# Patient Record
Sex: FEMALE | Race: WHITE | NOT HISPANIC OR LATINO | Employment: FULL TIME | URBAN - METROPOLITAN AREA
[De-identification: names, ages, dates, MRNs, and addresses within clinical notes are randomized per-mention and may not be internally consistent; named-entity substitution may affect disease eponyms.]

---

## 2021-05-18 ENCOUNTER — TRANSCRIBE ORDERS (OUTPATIENT)
Dept: EMERGENCY MEDICINE | Facility: CLINIC | Age: 41
End: 2021-05-18

## 2021-05-18 DIAGNOSIS — K70.40 ALCOHOLIC LIVER FAILURE (H): Primary | ICD-10-CM

## 2021-06-16 PROBLEM — E55.9 VITAMIN D DEFICIENCY: Status: ACTIVE | Noted: 2018-03-13

## 2021-06-16 PROBLEM — R56.9 ALCOHOL WITHDRAWAL SEIZURE WITH COMPLICATION (H): Status: ACTIVE | Noted: 2020-12-04

## 2021-06-16 PROBLEM — L30.9 ECZEMA: Status: ACTIVE | Noted: 2017-01-31

## 2021-06-16 PROBLEM — E87.29 ALCOHOLIC KETOACIDOSIS: Status: ACTIVE | Noted: 2020-12-04
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
## 2021-06-16 PROBLEM — A60.04 HERPES SIMPLEX VULVOVAGINITIS: Status: ACTIVE | Noted: 2018-03-13

## 2021-06-16 PROBLEM — Z12.4 CERVICAL CANCER SCREENING: Status: ACTIVE | Noted: 2017-02-07

## 2021-06-16 PROBLEM — F10.939 ALCOHOL WITHDRAWAL SEIZURE WITH COMPLICATION (H): Status: ACTIVE | Noted: 2020-12-04

## 2023-02-13 ENCOUNTER — HOSPITAL ENCOUNTER (INPATIENT)
Facility: CLINIC | Age: 43
LOS: 2 days | Discharge: HOME | DRG: 439 | End: 2023-02-15
Attending: STUDENT IN AN ORGANIZED HEALTH CARE EDUCATION/TRAINING PROGRAM | Admitting: HOSPITALIST
Payer: COMMERCIAL

## 2023-02-13 ENCOUNTER — APPOINTMENT (OUTPATIENT)
Dept: CT IMAGING | Facility: CLINIC | Age: 43
DRG: 439 | End: 2023-02-13
Attending: STUDENT IN AN ORGANIZED HEALTH CARE EDUCATION/TRAINING PROGRAM
Payer: COMMERCIAL

## 2023-02-13 ENCOUNTER — APPOINTMENT (OUTPATIENT)
Dept: ULTRASOUND IMAGING | Facility: CLINIC | Age: 43
DRG: 439 | End: 2023-02-13
Attending: STUDENT IN AN ORGANIZED HEALTH CARE EDUCATION/TRAINING PROGRAM
Payer: COMMERCIAL

## 2023-02-13 DIAGNOSIS — E83.42 HYPOMAGNESEMIA: ICD-10-CM

## 2023-02-13 DIAGNOSIS — E87.6 HYPOKALEMIA: ICD-10-CM

## 2023-02-13 DIAGNOSIS — K85.90 ACUTE PANCREATITIS, UNSPECIFIED COMPLICATION STATUS, UNSPECIFIED PANCREATITIS TYPE: ICD-10-CM

## 2023-02-13 DIAGNOSIS — F10.20 ALCOHOLISM (H): Primary | ICD-10-CM

## 2023-02-13 LAB
ALBUMIN SERPL-MCNC: 2.5 G/DL (ref 3.5–5)
ALBUMIN UR-MCNC: NEGATIVE MG/DL
ALP SERPL-CCNC: 204 U/L (ref 45–120)
ALT SERPL W P-5'-P-CCNC: 74 U/L (ref 0–45)
ANION GAP SERPL CALCULATED.3IONS-SCNC: 13 MMOL/L (ref 5–18)
APPEARANCE UR: CLEAR
AST SERPL W P-5'-P-CCNC: 99 U/L (ref 0–40)
BACTERIA #/AREA URNS HPF: ABNORMAL /HPF
BASOPHILS # BLD AUTO: 0.1 10E3/UL (ref 0–0.2)
BASOPHILS NFR BLD AUTO: 1 %
BILIRUB SERPL-MCNC: 8 MG/DL (ref 0–1)
BILIRUB UR QL STRIP: ABNORMAL
BNP SERPL-MCNC: 32 PG/ML (ref 0–64)
BUN SERPL-MCNC: 4 MG/DL (ref 8–22)
CALCIUM SERPL-MCNC: 8.6 MG/DL (ref 8.5–10.5)
CHLORIDE BLD-SCNC: 92 MMOL/L (ref 98–107)
CO2 SERPL-SCNC: 29 MMOL/L (ref 22–31)
COLOR UR AUTO: YELLOW
CREAT SERPL-MCNC: 0.59 MG/DL (ref 0.6–1.1)
D DIMER PPP FEU-MCNC: 1.52 UG/ML FEU (ref 0–0.5)
EOSINOPHIL # BLD AUTO: 0 10E3/UL (ref 0–0.7)
EOSINOPHIL NFR BLD AUTO: 0 %
ERYTHROCYTE [DISTWIDTH] IN BLOOD BY AUTOMATED COUNT: 25.2 % (ref 10–15)
ETHANOL SERPL-MCNC: <10 MG/DL
FLUAV RNA SPEC QL NAA+PROBE: NEGATIVE
FLUBV RNA RESP QL NAA+PROBE: NEGATIVE
GFR SERPL CREATININE-BSD FRML MDRD: >90 ML/MIN/1.73M2
GLUCOSE BLD-MCNC: 115 MG/DL (ref 70–125)
GLUCOSE UR STRIP-MCNC: NEGATIVE MG/DL
HCG SERPL QL: NEGATIVE
HCT VFR BLD AUTO: 36 % (ref 35–47)
HGB BLD-MCNC: 12.2 G/DL (ref 11.7–15.7)
HGB UR QL STRIP: NEGATIVE
HOLD SPECIMEN: NORMAL
IMM GRANULOCYTES # BLD: 0.1 10E3/UL
IMM GRANULOCYTES NFR BLD: 1 %
INR PPP: 1.13 (ref 0.85–1.15)
KETONES UR STRIP-MCNC: NEGATIVE MG/DL
LEUKOCYTE ESTERASE UR QL STRIP: NEGATIVE
LIPASE SERPL-CCNC: 23 U/L (ref 0–52)
LYMPHOCYTES # BLD AUTO: 2.2 10E3/UL (ref 0.8–5.3)
LYMPHOCYTES NFR BLD AUTO: 26 %
MAGNESIUM SERPL-MCNC: 1.3 MG/DL (ref 1.8–2.6)
MCH RBC QN AUTO: 33.3 PG (ref 26.5–33)
MCHC RBC AUTO-ENTMCNC: 33.9 G/DL (ref 31.5–36.5)
MCV RBC AUTO: 98 FL (ref 78–100)
MONOCYTES # BLD AUTO: 0.8 10E3/UL (ref 0–1.3)
MONOCYTES NFR BLD AUTO: 9 %
NEUTROPHILS # BLD AUTO: 5.5 10E3/UL (ref 1.6–8.3)
NEUTROPHILS NFR BLD AUTO: 63 %
NITRATE UR QL: NEGATIVE
NRBC # BLD AUTO: 0 10E3/UL
NRBC BLD AUTO-RTO: 0 /100
PH UR STRIP: 7 [PH] (ref 5–7)
PLATELET # BLD AUTO: 225 10E3/UL (ref 150–450)
POTASSIUM BLD-SCNC: 2.4 MMOL/L (ref 3.5–5)
PROT SERPL-MCNC: 6.4 G/DL (ref 6–8)
RBC # BLD AUTO: 3.66 10E6/UL (ref 3.8–5.2)
RBC URINE: 0 /HPF
RSV RNA SPEC NAA+PROBE: NEGATIVE
SARS-COV-2 RNA RESP QL NAA+PROBE: NEGATIVE
SODIUM SERPL-SCNC: 134 MMOL/L (ref 136–145)
SP GR UR STRIP: 1.01 (ref 1–1.03)
SQUAMOUS EPITHELIAL: 1 /HPF
TSH SERPL DL<=0.005 MIU/L-ACNC: 6.39 UIU/ML (ref 0.3–5)
UROBILINOGEN UR STRIP-MCNC: <2 MG/DL
WBC # BLD AUTO: 8.7 10E3/UL (ref 4–11)
WBC URINE: 1 /HPF

## 2023-02-13 PROCEDURE — 84443 ASSAY THYROID STIM HORMONE: CPT | Performed by: HOSPITALIST

## 2023-02-13 PROCEDURE — 83735 ASSAY OF MAGNESIUM: CPT | Performed by: STUDENT IN AN ORGANIZED HEALTH CARE EDUCATION/TRAINING PROGRAM

## 2023-02-13 PROCEDURE — 83690 ASSAY OF LIPASE: CPT | Performed by: STUDENT IN AN ORGANIZED HEALTH CARE EDUCATION/TRAINING PROGRAM

## 2023-02-13 PROCEDURE — 82533 TOTAL CORTISOL: CPT | Performed by: HOSPITALIST

## 2023-02-13 PROCEDURE — 250N000013 HC RX MED GY IP 250 OP 250 PS 637: Performed by: HOSPITALIST

## 2023-02-13 PROCEDURE — 36415 COLL VENOUS BLD VENIPUNCTURE: CPT | Performed by: HOSPITALIST

## 2023-02-13 PROCEDURE — 85610 PROTHROMBIN TIME: CPT | Performed by: HOSPITALIST

## 2023-02-13 PROCEDURE — 82077 ASSAY SPEC XCP UR&BREATH IA: CPT | Performed by: STUDENT IN AN ORGANIZED HEALTH CARE EDUCATION/TRAINING PROGRAM

## 2023-02-13 PROCEDURE — 76705 ECHO EXAM OF ABDOMEN: CPT

## 2023-02-13 PROCEDURE — C9803 HOPD COVID-19 SPEC COLLECT: HCPCS

## 2023-02-13 PROCEDURE — 84703 CHORIONIC GONADOTROPIN ASSAY: CPT | Performed by: STUDENT IN AN ORGANIZED HEALTH CARE EDUCATION/TRAINING PROGRAM

## 2023-02-13 PROCEDURE — 258N000003 HC RX IP 258 OP 636: Performed by: STUDENT IN AN ORGANIZED HEALTH CARE EDUCATION/TRAINING PROGRAM

## 2023-02-13 PROCEDURE — 250N000013 HC RX MED GY IP 250 OP 250 PS 637: Performed by: STUDENT IN AN ORGANIZED HEALTH CARE EDUCATION/TRAINING PROGRAM

## 2023-02-13 PROCEDURE — 83880 ASSAY OF NATRIURETIC PEPTIDE: CPT | Performed by: STUDENT IN AN ORGANIZED HEALTH CARE EDUCATION/TRAINING PROGRAM

## 2023-02-13 PROCEDURE — 81001 URINALYSIS AUTO W/SCOPE: CPT | Performed by: STUDENT IN AN ORGANIZED HEALTH CARE EDUCATION/TRAINING PROGRAM

## 2023-02-13 PROCEDURE — 74177 CT ABD & PELVIS W/CONTRAST: CPT

## 2023-02-13 PROCEDURE — 87637 SARSCOV2&INF A&B&RSV AMP PRB: CPT | Performed by: HOSPITALIST

## 2023-02-13 PROCEDURE — 99222 1ST HOSP IP/OBS MODERATE 55: CPT | Performed by: HOSPITALIST

## 2023-02-13 PROCEDURE — 96361 HYDRATE IV INFUSION ADD-ON: CPT

## 2023-02-13 PROCEDURE — 99285 EMERGENCY DEPT VISIT HI MDM: CPT | Mod: 25

## 2023-02-13 PROCEDURE — 96365 THER/PROPH/DIAG IV INF INIT: CPT

## 2023-02-13 PROCEDURE — 80053 COMPREHEN METABOLIC PANEL: CPT | Performed by: STUDENT IN AN ORGANIZED HEALTH CARE EDUCATION/TRAINING PROGRAM

## 2023-02-13 PROCEDURE — 93005 ELECTROCARDIOGRAM TRACING: CPT | Performed by: STUDENT IN AN ORGANIZED HEALTH CARE EDUCATION/TRAINING PROGRAM

## 2023-02-13 PROCEDURE — 96375 TX/PRO/DX INJ NEW DRUG ADDON: CPT

## 2023-02-13 PROCEDURE — 85025 COMPLETE CBC W/AUTO DIFF WBC: CPT | Performed by: STUDENT IN AN ORGANIZED HEALTH CARE EDUCATION/TRAINING PROGRAM

## 2023-02-13 PROCEDURE — 120N000001 HC R&B MED SURG/OB

## 2023-02-13 PROCEDURE — 96376 TX/PRO/DX INJ SAME DRUG ADON: CPT

## 2023-02-13 PROCEDURE — 85379 FIBRIN DEGRADATION QUANT: CPT | Performed by: HOSPITALIST

## 2023-02-13 PROCEDURE — 250N000011 HC RX IP 250 OP 636: Performed by: STUDENT IN AN ORGANIZED HEALTH CARE EDUCATION/TRAINING PROGRAM

## 2023-02-13 PROCEDURE — 84439 ASSAY OF FREE THYROXINE: CPT | Performed by: HOSPITALIST

## 2023-02-13 PROCEDURE — 36415 COLL VENOUS BLD VENIPUNCTURE: CPT | Performed by: STUDENT IN AN ORGANIZED HEALTH CARE EDUCATION/TRAINING PROGRAM

## 2023-02-13 PROCEDURE — 81001 URINALYSIS AUTO W/SCOPE: CPT | Performed by: EMERGENCY MEDICINE

## 2023-02-13 PROCEDURE — 96367 TX/PROPH/DG ADDL SEQ IV INF: CPT

## 2023-02-13 RX ORDER — HALOPERIDOL 5 MG/ML
2.5-5 INJECTION INTRAMUSCULAR EVERY 6 HOURS PRN
Status: DISCONTINUED | OUTPATIENT
Start: 2023-02-13 | End: 2023-02-15 | Stop reason: HOSPADM

## 2023-02-13 RX ORDER — PROCHLORPERAZINE MALEATE 10 MG
10 TABLET ORAL EVERY 6 HOURS PRN
Status: DISCONTINUED | OUTPATIENT
Start: 2023-02-13 | End: 2023-02-15 | Stop reason: HOSPADM

## 2023-02-13 RX ORDER — MULTIPLE VITAMINS W/ MINERALS TAB 9MG-400MCG
1 TAB ORAL DAILY
Status: DISCONTINUED | OUTPATIENT
Start: 2023-02-13 | End: 2023-02-15 | Stop reason: HOSPADM

## 2023-02-13 RX ORDER — POTASSIUM CHLORIDE 1.5 G/1.58G
40 POWDER, FOR SOLUTION ORAL ONCE
Status: COMPLETED | OUTPATIENT
Start: 2023-02-13 | End: 2023-02-13

## 2023-02-13 RX ORDER — FOLIC ACID 1 MG/1
1 TABLET ORAL DAILY
Status: DISCONTINUED | OUTPATIENT
Start: 2023-02-13 | End: 2023-02-15 | Stop reason: HOSPADM

## 2023-02-13 RX ORDER — KETOROLAC TROMETHAMINE 15 MG/ML
30 INJECTION, SOLUTION INTRAMUSCULAR; INTRAVENOUS ONCE
Status: COMPLETED | OUTPATIENT
Start: 2023-02-13 | End: 2023-02-13

## 2023-02-13 RX ORDER — ONDANSETRON 4 MG/1
4 TABLET, ORALLY DISINTEGRATING ORAL EVERY 6 HOURS PRN
Status: DISCONTINUED | OUTPATIENT
Start: 2023-02-13 | End: 2023-02-15 | Stop reason: HOSPADM

## 2023-02-13 RX ORDER — LORAZEPAM 2 MG/ML
1 INJECTION INTRAMUSCULAR ONCE
Status: COMPLETED | OUTPATIENT
Start: 2023-02-13 | End: 2023-02-13

## 2023-02-13 RX ORDER — FLUMAZENIL 0.1 MG/ML
0.2 INJECTION, SOLUTION INTRAVENOUS
Status: DISCONTINUED | OUTPATIENT
Start: 2023-02-13 | End: 2023-02-15 | Stop reason: HOSPADM

## 2023-02-13 RX ORDER — SODIUM CHLORIDE, SODIUM LACTATE, POTASSIUM CHLORIDE, CALCIUM CHLORIDE 600; 310; 30; 20 MG/100ML; MG/100ML; MG/100ML; MG/100ML
INJECTION, SOLUTION INTRAVENOUS CONTINUOUS
Status: DISCONTINUED | OUTPATIENT
Start: 2023-02-13 | End: 2023-02-15 | Stop reason: HOSPADM

## 2023-02-13 RX ORDER — LORAZEPAM 1 MG/1
1-2 TABLET ORAL EVERY 30 MIN PRN
Status: DISCONTINUED | OUTPATIENT
Start: 2023-02-13 | End: 2023-02-15 | Stop reason: HOSPADM

## 2023-02-13 RX ORDER — LORAZEPAM 2 MG/ML
1-2 INJECTION INTRAMUSCULAR EVERY 30 MIN PRN
Status: DISCONTINUED | OUTPATIENT
Start: 2023-02-13 | End: 2023-02-15 | Stop reason: HOSPADM

## 2023-02-13 RX ORDER — POTASSIUM CHLORIDE 7.45 MG/ML
10 INJECTION INTRAVENOUS ONCE
Status: COMPLETED | OUTPATIENT
Start: 2023-02-13 | End: 2023-02-13

## 2023-02-13 RX ORDER — LANOLIN ALCOHOL/MO/W.PET/CERES
3 CREAM (GRAM) TOPICAL
Status: DISCONTINUED | OUTPATIENT
Start: 2023-02-13 | End: 2023-02-15 | Stop reason: HOSPADM

## 2023-02-13 RX ORDER — MAGNESIUM SULFATE HEPTAHYDRATE 40 MG/ML
2 INJECTION, SOLUTION INTRAVENOUS ONCE
Status: COMPLETED | OUTPATIENT
Start: 2023-02-13 | End: 2023-02-13

## 2023-02-13 RX ORDER — LORAZEPAM 2 MG/ML
0.5 INJECTION INTRAMUSCULAR ONCE
Status: COMPLETED | OUTPATIENT
Start: 2023-02-13 | End: 2023-02-13

## 2023-02-13 RX ORDER — LIDOCAINE 40 MG/G
CREAM TOPICAL
Status: DISCONTINUED | OUTPATIENT
Start: 2023-02-13 | End: 2023-02-15 | Stop reason: HOSPADM

## 2023-02-13 RX ORDER — IOPAMIDOL 755 MG/ML
90 INJECTION, SOLUTION INTRAVASCULAR ONCE
Status: COMPLETED | OUTPATIENT
Start: 2023-02-13 | End: 2023-02-13

## 2023-02-13 RX ORDER — ONDANSETRON 2 MG/ML
4 INJECTION INTRAMUSCULAR; INTRAVENOUS EVERY 6 HOURS PRN
Status: DISCONTINUED | OUTPATIENT
Start: 2023-02-13 | End: 2023-02-15 | Stop reason: HOSPADM

## 2023-02-13 RX ORDER — PROCHLORPERAZINE 25 MG
25 SUPPOSITORY, RECTAL RECTAL EVERY 12 HOURS PRN
Status: DISCONTINUED | OUTPATIENT
Start: 2023-02-13 | End: 2023-02-15 | Stop reason: HOSPADM

## 2023-02-13 RX ADMIN — MAGNESIUM SULFATE HEPTAHYDRATE 2 G: 40 INJECTION, SOLUTION INTRAVENOUS at 19:49

## 2023-02-13 RX ADMIN — FOLIC ACID 1 MG: 1 TABLET ORAL at 21:54

## 2023-02-13 RX ADMIN — SODIUM CHLORIDE 1000 ML: 9 INJECTION, SOLUTION INTRAVENOUS at 17:05

## 2023-02-13 RX ADMIN — POTASSIUM CHLORIDE 40 MEQ: 1.5 POWDER, FOR SOLUTION ORAL at 18:03

## 2023-02-13 RX ADMIN — MULTIPLE VITAMINS W/ MINERALS TAB 1 TABLET: TAB at 21:54

## 2023-02-13 RX ADMIN — SODIUM CHLORIDE 1000 ML: 9 INJECTION, SOLUTION INTRAVENOUS at 20:49

## 2023-02-13 RX ADMIN — LORAZEPAM 1 MG: 2 INJECTION INTRAMUSCULAR; INTRAVENOUS at 20:49

## 2023-02-13 RX ADMIN — KETOROLAC TROMETHAMINE 30 MG: 15 INJECTION, SOLUTION INTRAMUSCULAR; INTRAVENOUS at 17:21

## 2023-02-13 RX ADMIN — POTASSIUM CHLORIDE 10 MEQ: 10 INJECTION, SOLUTION INTRAVENOUS at 17:57

## 2023-02-13 RX ADMIN — LORAZEPAM 0.5 MG: 2 INJECTION INTRAMUSCULAR; INTRAVENOUS at 17:12

## 2023-02-13 RX ADMIN — Medication 100 MG: at 21:54

## 2023-02-13 RX ADMIN — IOPAMIDOL 90 ML: 755 INJECTION, SOLUTION INTRAVENOUS at 17:38

## 2023-02-13 RX ADMIN — SODIUM CHLORIDE, POTASSIUM CHLORIDE, SODIUM LACTATE AND CALCIUM CHLORIDE 1000 ML: 600; 310; 30; 20 INJECTION, SOLUTION INTRAVENOUS at 22:37

## 2023-02-13 ASSESSMENT — ACTIVITIES OF DAILY LIVING (ADL)
ADLS_ACUITY_SCORE: 35
ADLS_ACUITY_SCORE: 33

## 2023-02-13 ASSESSMENT — ENCOUNTER SYMPTOMS
ABDOMINAL DISTENTION: 1
HEMATURIA: 1
NERVOUS/ANXIOUS: 1
VOMITING: 1
FEVER: 0
ABDOMINAL PAIN: 1

## 2023-02-13 NOTE — ED PROVIDER NOTES
EMERGENCY DEPARTMENT ENCOUNTER      NAME: Tere Hoyt  AGE: 42 year old female  YOB: 1980  MRN: [id number]  EVALUATION DATE & TIME: 2/13/2023  4:07 PM    PCP: [facility]    ED PROVIDER: Dane Saravia M.D.      Chief Complaint   Patient presents with     Back Pain     Abdominal Pain     Multiple Complaints         FINAL IMPRESSION:  1. Acute pancreatitis, unspecified complication status, unspecified pancreatitis type    2. Hypokalemia    3. Hypomagnesemia          ED COURSE & MEDICAL DECISION MAKING:    Pertinent Labs & Imaging studies reviewed. (See chart for details)  42 year old female presents to the Emergency Department for evaluation of multiple issues including weakness, edema to her lower extremities, and some abdominal pain.  Considered CHF, DVT as well as multiple other intra-abdominal processes.  Initially started with laboratory evaluation and ordered a CT scan for the abdominal pain.  Patient's electrolytes showed fairly significant hyperkalemia as well as hypomagnesemia.  In addition the patient had pancreatitis seen on CT scan.  Ultrasound showed no obstructive disease.  Liver enzymes and bilirubin were normal.  Patient does admit to EtOH use and appeared clinically dehydrated on exam.    Patient received fluids pain medicine and some Ativan here in the emergency department as well as IV potassium and magnesium replacement.  She felt somewhat better with fluids  However I felt that with the electrolyte derangements that admission to the hospital for further care was necessary.  Lana spoke to the hospitalist who will admit the patient for further care    At the conclusion of the encounter I discussed the results of all of the tests and the disposition. The questions were answered. The patient or family acknowledged understanding and was agreeable with the care plan.        4:15 PM I met the patient and performed my initial interview and exam.  5:28 PM Critical  result: potassium 2.4.   8:03 PM I rechecked and updated the patient on results and plan for admission. Patient states she has been drinking alcohol lately.   9:37 PM I discussed the case with Dr. Álvaro Rollins, hospitalist, who accepts the patient for admission.        Medical Decision Making    History:    Supplemental history from: Documented in chart, if applicable    External Record(s) reviewed: Documented in chart, if applicable.    Work Up:    Chart documentation includes differential considered and any EKGs or imaging independently interpreted by provider, where specified.    In additional to work up documented, I considered the following work up: Documented in chart, if applicable.    External consultation:    Discussion of management with another provider: Hospitalist    Complicating factors:    Care impacted by chronic illness: Other: etoh use    Care affected by social determinants of health: Alcohol Abuse and/or Recreational Drug Use, Low Income and Problems Related to Primary Support Group    Disposition considerations: Admit.          30 minutes of critical care time     MEDICATIONS GIVEN IN THE EMERGENCY:  Medications   lidocaine 1 % 0.1-1 mL (has no administration in time range)   lidocaine (LMX4) cream (has no administration in time range)   sodium chloride (PF) 0.9% PF flush 3 mL (has no administration in time range)   sodium chloride (PF) 0.9% PF flush 3 mL (3 mLs Intracatheter Given 2/13/23 2156)   melatonin tablet 3 mg (has no administration in time range)   lactated ringers infusion (has no administration in time range)   ondansetron (ZOFRAN ODT) ODT tab 4 mg (has no administration in time range)     Or   ondansetron (ZOFRAN) injection 4 mg (has no administration in time range)   prochlorperazine (COMPAZINE) injection 10 mg (has no administration in time range)     Or   prochlorperazine (COMPAZINE) tablet 10 mg (has no administration in time range)     Or   prochlorperazine (COMPAZINE)  suppository 25 mg (has no administration in time range)   OLANZapine zydis (zyPREXA) ODT half-tab 5-10 mg (has no administration in time range)     Or   haloperidol lactate (HALDOL) injection 2.5-5 mg (has no administration in time range)   flumazenil (ROMAZICON) injection 0.2 mg (has no administration in time range)   LORazepam (ATIVAN) tablet 1-2 mg (has no administration in time range)     Or   LORazepam (ATIVAN) injection 1-2 mg (has no administration in time range)   thiamine (B-1) tablet 100 mg (100 mg Oral Given 2/13/23 2154)   folic acid (FOLVITE) tablet 1 mg (1 mg Oral Given 2/13/23 2154)   multivitamin w/minerals (THERA-VIT-M) tablet 1 tablet (1 tablet Oral Given 2/13/23 2154)   LORazepam (ATIVAN) injection 0.5 mg (0.5 mg Intravenous Given 2/13/23 1712)   ketorolac (TORADOL) injection 30 mg (30 mg Intravenous Given 2/13/23 1721)   0.9% sodium chloride BOLUS (0 mLs Intravenous Stopped 2/13/23 1805)   potassium chloride 10 mEq in 100 mL sterile water infusion (0 mEq Intravenous Stopped 2/13/23 1900)   potassium chloride (KLOR-CON) Packet 40 mEq (40 mEq Oral Given 2/13/23 1803)   magnesium sulfate 2 g in 50 mL sterile water intermittent infusion (0 g Intravenous Stopped 2/13/23 2045)   iopamidol (ISOVUE-370) solution 90 mL (90 mLs Intravenous Given 2/13/23 1738)   0.9% sodium chloride BOLUS (0 mLs Intravenous Stopped 2/13/23 2239)   LORazepam (ATIVAN) injection 1 mg (1 mg Intravenous Given 2/13/23 2049)   lactated ringers BOLUS 1,000 mL (1,000 mLs Intravenous New Bag 2/13/23 2237)       NEW PRESCRIPTIONS STARTED AT TODAY'S ER VISIT  New Prescriptions    No medications on file          =================================================================    HPI    Patient information was obtained from: patient     Use of : N/A        Tere RACHELLE Lai is a 42 year old female with a pertinent history of anxiety, depression, alcoholism, alcohol withdrawal seizure, tobacco dependence, who presents for  evaluation of leg swelling, back pain.    Patient reports she has been bedridden for the past ~1 month because she had been feeling ill with symptoms including vomiting and body aches. She was not medically evaluated because she didn't have transportation. At some point while bedridden she developed low back pain which has been constant since onset. Pain is exacerbated when she stands up straight after bending over. Since 2/11 (2 days ago), she has been applying Salonpas patches and BioFreeze, and taking epsom salt baths. She has experienced similar back pain in the past, but never this persistent. On 2/6 (7 days ago) she finally started feeling better and began moving around more to get her strength back, and started eating again. She has been eating salads and fruits, and drinking fluids. Over this past weekend, she had a few episodes of vomiting, bringing up minimal amounts of light brown fluid. Her abdomen has been distended and lower abdomen is crampy. This morning she noticed blood in her urine. On 2/10 (3 days ago), the patient states she was very active. The next afternoon 2/11, she noted bilateral ankle swelling which has since worsened and spread to her lower legs (R>L). No fevers, leg pain.     Additionally, patient reports she received word her estranged  passed away just prior to arrival today. She feels very anxious and overwhelmed. Her parents are coming to town to help her this week. Patient lives at home with her son. Scheduled for phone appointment with PCP tomorrow, but unsure if she will follow through with everything she has going on.       REVIEW OF SYSTEMS   Review of Systems   Constitutional: Negative for fever.   Cardiovascular: Positive for leg swelling (bilateral).   Gastrointestinal: Positive for abdominal distention, abdominal pain (lower) and vomiting.   Genitourinary: Positive for hematuria.   Musculoskeletal:        Negative for leg pain.   Psychiatric/Behavioral: The patient  "is nervous/anxious.    All other systems reviewed and are negative.       PAST MEDICAL HISTORY:  History reviewed. No pertinent past medical history.    PAST SURGICAL HISTORY:  History reviewed. No pertinent surgical history.        CURRENT MEDICATIONS:    levonorgestrel (MIRENA) 20 MCG/DAY IUD  Vitamin D3 (CHOLECALCIFEROL) 125 MCG (5000 UT) tablet        ALLERGIES:  No Known Allergies    FAMILY HISTORY:  History reviewed. No pertinent family history.    SOCIAL HISTORY:   Social History     Socioeconomic History     Marital status:        VITALS:  BP 95/47   Pulse 92   Temp 98.2  F (36.8  C) (Temporal)   Resp 24   Ht 1.575 m (5' 2\")   Wt 79.4 kg (175 lb)   SpO2 95%   BMI 32.01 kg/m        PHYSICAL EXAM    Constitutional: Well developed, Well nourished, NAD, GCS 15  HENT: Dry mucous membranes. Normocephalic, Atraumatic, Bilateral external ears normal, Oropharynx normal, Nose normal. Neck-  Normal range of motion, No tenderness, Supple, No stridor.  Eyes: PERRL, EOMI, Conjunctiva normal, No discharge.   Respiratory: Normal breath sounds, No respiratory distress, No wheezing, Speaks full sentences easily. No cough.  Cardiovascular: Normal heart rate, Regular rhythm, No murmurs, No rubs, No gallops. Chest wall nontender.  GI: Diffuse mild abdominal tenderness. Soft, No masses, No flank tenderness. No rebound or guarding.   Musculoskeletal: 1+ bilateral lower extremity edema. Legs nontender, no asymmetry. 2+ DP pulses. No cyanosis, No clubbing. Good range of motion in all major joints. No tenderness to palpation or major deformities noted.   Integument: Warm, Dry, No erythema, No rash. No petechiae.   Neurologic: Alert & oriented x 3,  CN 3-12 intact Normal motor function, Normal sensory function, No focal deficits noted. Normal gait. Normal finger to nose bilaterally  Psychiatric: Affect normal, Judgment normal, Mood normal. Cooperative.          LAB:  All pertinent labs reviewed and interpreted.  Labs " Ordered and Resulted from Time of ED Arrival to Time of ED Departure   ROUTINE UA WITH MICROSCOPIC REFLEX TO CULTURE - Abnormal       Result Value    Color Urine Yellow      Appearance Urine Clear      Glucose Urine Negative      Bilirubin Urine 1.0 mg/dL (*)     Ketones Urine Negative      Specific Gravity Urine 1.006      Blood Urine Negative      pH Urine 7.0      Protein Albumin Urine Negative      Urobilinogen Urine <2.0      Nitrite Urine Negative      Leukocyte Esterase Urine Negative      Bacteria Urine Few (*)     RBC Urine 0      WBC Urine 1      Squamous Epithelials Urine 1     COMPREHENSIVE METABOLIC PANEL - Abnormal    Sodium 134 (*)     Potassium 2.4 (*)     Chloride 92 (*)     Carbon Dioxide (CO2) 29      Anion Gap 13      Urea Nitrogen 4 (*)     Creatinine 0.59 (*)     Calcium 8.6      Glucose 115      Alkaline Phosphatase 204 (*)     AST 99 (*)     ALT 74 (*)     Protein Total 6.4      Albumin 2.5 (*)     Bilirubin Total 8.0 (*)     GFR Estimate >90     MAGNESIUM - Abnormal    Magnesium 1.3 (*)    CBC WITH PLATELETS AND DIFFERENTIAL - Abnormal    WBC Count 8.7      RBC Count 3.66 (*)     Hemoglobin 12.2      Hematocrit 36.0      MCV 98      MCH 33.3 (*)     MCHC 33.9      RDW 25.2 (*)     Platelet Count 225      % Neutrophils 63      % Lymphocytes 26      % Monocytes 9      % Eosinophils 0      % Basophils 1      % Immature Granulocytes 1      NRBCs per 100 WBC 0      Absolute Neutrophils 5.5      Absolute Lymphocytes 2.2      Absolute Monocytes 0.8      Absolute Eosinophils 0.0      Absolute Basophils 0.1      Absolute Immature Granulocytes 0.1      Absolute NRBCs 0.0     TSH WITH FREE T4 REFLEX - Abnormal    TSH 6.39 (*)    D DIMER QUANTITATIVE - Abnormal    D-Dimer Quantitative 1.52 (*)    HCG QUALITATIVE PREGNANCY - Normal    hCG Serum Qualitative Negative     B-TYPE NATRIURETIC PEPTIDE ([facility] ONLY) - Normal    BNP 32     ETHYL ALCOHOL LEVEL - Normal    Alcohol, Blood <10     LIPASE - Normal     Lipase 23     INR - Normal    INR 1.13     INFLUENZA A/B & SARS-COV2 PCR MULTIPLEX   CORTISOL   T4 FREE       RADIOLOGY:  Reviewed all pertinent imaging. Please see official radiology report.  Abdomen US, limited ([facility] only)   Final Result   IMPRESSION:   1.  Pelvic steatosis and moderate hepatomegaly.            CT Abdomen Pelvis w Contrast   Final Result   IMPRESSION:       1.  Acute interstitial edematous pancreatitis involving the distal pancreatic body and tail. Main pancreatic duct is mildly dilated and diffusely irregular throughout the inflamed portion of the pancreas.      2.  No peripancreatic fluid collections or evidence of pancreatic parenchymal necrosis.      3.  No calcified gallstones or biliary ductal dilation.      4.  Severe diffuse hepatic steatosis, unchanged.      5.  Mild wall thickening of the ascending colon, improved since 2021, and of uncertain etiology. This could be pseudothickening from underdistention, portal colopathy in the setting of liver disease, or a mild infectious/inflammatory colitis.          EKG:    Performed at: 1720    Impression: sinus rhythm with sinus arrhythmia. Abnormal ECG.     Rate: 97 bpm   Rhythm: sinus   Axis: 53  AL Interval: 154  QRS Interval: 84  QTc Interval: 490  ST Changes: none  Comparison: When compared with ECG 10/11/1997, nonspecific T wave abnormality now evident in inferior leads. QT has lengthened.     I have independently reviewed and interpreted the EKG(s) documented above.    PROCEDURES:   None      I, Sonya Wylie, am serving as a scribe to document services personally performed by Dr. Dane Saravia based on my observation and the provider's statements to me. I, Dane Saravia MD attest that Sonya Wylie is acting in a scribe capacity, has observed my performance of the services and has documented them in accordance with my direction.    Dane Saravia M.D.  Emergency Medicine  [facility]  [facility] EMERGENCY ROOM  [facility] [facility] [id number]  [phone]  Dept: [phone]       Dane Saravia MD  02/13/23 8174

## 2023-02-13 NOTE — ED TRIAGE NOTES
Pt has had multiple ongoing problems. In December, pt spent most of the month sick with body aches, nausea, emesis, poor appetite and weakness. Pt continued to feel unwell and weak throughout January. For the past month, pt has developed low back pain and lower abdominal pain which is severe at times. Pt also reports fatigue and [place] with climbing stairs and other activities. Over the weekend, pt noted swelling to BLE, worse to R leg.      Triage Assessment     Row Name 02/13/23 1448       Triage Assessment (Adult)    Airway WDL WDL       Respiratory WDL    Respiratory WDL X;rhythm/pattern    Rhythm/Pattern, Respiratory dyspnea on exertion       Skin Circulation/Temperature WDL    Skin Circulation/Temperature WDL WDL       Cardiac WDL    Cardiac WDL X;rhythm    Pulse Rate & Regularity tachycardic       Peripheral/Neurovascular WDL    Peripheral Neurovascular WDL WDL       Cognitive/Neuro/Behavioral WDL    Cognitive/Neuro/Behavioral WDL WDL

## 2023-02-14 ENCOUNTER — APPOINTMENT (OUTPATIENT)
Dept: ULTRASOUND IMAGING | Facility: CLINIC | Age: 43
DRG: 439 | End: 2023-02-14
Attending: FAMILY MEDICINE
Payer: COMMERCIAL

## 2023-02-14 LAB
ALBUMIN SERPL-MCNC: 1.9 G/DL (ref 3.5–5)
ALP SERPL-CCNC: 156 U/L (ref 45–120)
ALT SERPL W P-5'-P-CCNC: 48 U/L (ref 0–45)
ANION GAP SERPL CALCULATED.3IONS-SCNC: 9 MMOL/L (ref 5–18)
AST SERPL W P-5'-P-CCNC: 79 U/L (ref 0–40)
ATRIAL RATE - MUSE: 97 BPM
BASOPHILS # BLD AUTO: 0.1 10E3/UL (ref 0–0.2)
BASOPHILS NFR BLD AUTO: 1 %
BILIRUB SERPL-MCNC: 7.1 MG/DL (ref 0–1)
BUN SERPL-MCNC: 3 MG/DL (ref 8–22)
CALCIUM SERPL-MCNC: 7.8 MG/DL (ref 8.5–10.5)
CHLORIDE BLD-SCNC: 101 MMOL/L (ref 98–107)
CO2 SERPL-SCNC: 27 MMOL/L (ref 22–31)
CORTIS SERPL-MCNC: 9.4 UG/DL
CREAT SERPL-MCNC: 0.56 MG/DL (ref 0.6–1.1)
DIASTOLIC BLOOD PRESSURE - MUSE: NORMAL MMHG
EOSINOPHIL # BLD AUTO: 0.1 10E3/UL (ref 0–0.7)
EOSINOPHIL NFR BLD AUTO: 1 %
ERYTHROCYTE [DISTWIDTH] IN BLOOD BY AUTOMATED COUNT: 25.7 % (ref 10–15)
GFR SERPL CREATININE-BSD FRML MDRD: >90 ML/MIN/1.73M2
GLUCOSE BLD-MCNC: 95 MG/DL (ref 70–125)
HCT VFR BLD AUTO: 30.6 % (ref 35–47)
HGB BLD-MCNC: 10.5 G/DL (ref 11.7–15.7)
IMM GRANULOCYTES # BLD: 0.1 10E3/UL
IMM GRANULOCYTES NFR BLD: 1 %
INR PPP: 1.19 (ref 0.85–1.15)
INTERPRETATION ECG - MUSE: NORMAL
LYMPHOCYTES # BLD AUTO: 2 10E3/UL (ref 0.8–5.3)
LYMPHOCYTES NFR BLD AUTO: 26 %
MAGNESIUM SERPL-MCNC: 1.8 MG/DL (ref 1.8–2.6)
MCH RBC QN AUTO: 33.5 PG (ref 26.5–33)
MCHC RBC AUTO-ENTMCNC: 34.3 G/DL (ref 31.5–36.5)
MCV RBC AUTO: 98 FL (ref 78–100)
MONOCYTES # BLD AUTO: 0.8 10E3/UL (ref 0–1.3)
MONOCYTES NFR BLD AUTO: 10 %
NEUTROPHILS # BLD AUTO: 4.8 10E3/UL (ref 1.6–8.3)
NEUTROPHILS NFR BLD AUTO: 61 %
NRBC # BLD AUTO: 0 10E3/UL
NRBC BLD AUTO-RTO: 0 /100
P AXIS - MUSE: 25 DEGREES
PLATELET # BLD AUTO: 182 10E3/UL (ref 150–450)
POTASSIUM BLD-SCNC: 2.9 MMOL/L (ref 3.5–5)
POTASSIUM BLD-SCNC: 3.8 MMOL/L (ref 3.5–5)
PR INTERVAL - MUSE: 154 MS
PROT SERPL-MCNC: 4.9 G/DL (ref 6–8)
QRS DURATION - MUSE: 84 MS
QT - MUSE: 386 MS
QTC - MUSE: 490 MS
R AXIS - MUSE: 53 DEGREES
RBC # BLD AUTO: 3.13 10E6/UL (ref 3.8–5.2)
SODIUM SERPL-SCNC: 137 MMOL/L (ref 136–145)
SYSTOLIC BLOOD PRESSURE - MUSE: NORMAL MMHG
T AXIS - MUSE: -5 DEGREES
T4 FREE SERPL-MCNC: 1.23 NG/DL (ref 0.9–1.7)
VENTRICULAR RATE- MUSE: 97 BPM
WBC # BLD AUTO: 7.8 10E3/UL (ref 4–11)

## 2023-02-14 PROCEDURE — 250N000013 HC RX MED GY IP 250 OP 250 PS 637: Performed by: FAMILY MEDICINE

## 2023-02-14 PROCEDURE — 36415 COLL VENOUS BLD VENIPUNCTURE: CPT | Performed by: HOSPITALIST

## 2023-02-14 PROCEDURE — 80053 COMPREHEN METABOLIC PANEL: CPT | Performed by: HOSPITALIST

## 2023-02-14 PROCEDURE — 258N000003 HC RX IP 258 OP 636: Performed by: HOSPITALIST

## 2023-02-14 PROCEDURE — 84132 ASSAY OF SERUM POTASSIUM: CPT | Performed by: FAMILY MEDICINE

## 2023-02-14 PROCEDURE — 85610 PROTHROMBIN TIME: CPT | Performed by: HOSPITALIST

## 2023-02-14 PROCEDURE — 250N000013 HC RX MED GY IP 250 OP 250 PS 637: Performed by: HOSPITALIST

## 2023-02-14 PROCEDURE — 83735 ASSAY OF MAGNESIUM: CPT | Performed by: HOSPITALIST

## 2023-02-14 PROCEDURE — 99233 SBSQ HOSP IP/OBS HIGH 50: CPT | Performed by: FAMILY MEDICINE

## 2023-02-14 PROCEDURE — 85025 COMPLETE CBC W/AUTO DIFF WBC: CPT | Performed by: HOSPITALIST

## 2023-02-14 PROCEDURE — 120N000001 HC R&B MED SURG/OB

## 2023-02-14 PROCEDURE — HZ2ZZZZ DETOXIFICATION SERVICES FOR SUBSTANCE ABUSE TREATMENT: ICD-10-PCS | Performed by: STUDENT IN AN ORGANIZED HEALTH CARE EDUCATION/TRAINING PROGRAM

## 2023-02-14 PROCEDURE — 93970 EXTREMITY STUDY: CPT

## 2023-02-14 PROCEDURE — 36415 COLL VENOUS BLD VENIPUNCTURE: CPT | Performed by: FAMILY MEDICINE

## 2023-02-14 RX ORDER — ACETAMINOPHEN 325 MG/1
650 TABLET ORAL EVERY 6 HOURS PRN
Status: DISCONTINUED | OUTPATIENT
Start: 2023-02-14 | End: 2023-02-15 | Stop reason: HOSPADM

## 2023-02-14 RX ORDER — POTASSIUM CHLORIDE 1500 MG/1
40 TABLET, EXTENDED RELEASE ORAL ONCE
Status: COMPLETED | OUTPATIENT
Start: 2023-02-14 | End: 2023-02-14

## 2023-02-14 RX ORDER — POTASSIUM CHLORIDE 1500 MG/1
20 TABLET, EXTENDED RELEASE ORAL ONCE
Status: COMPLETED | OUTPATIENT
Start: 2023-02-14 | End: 2023-02-14

## 2023-02-14 RX ADMIN — POTASSIUM CHLORIDE 40 MEQ: 1500 TABLET, EXTENDED RELEASE ORAL at 08:25

## 2023-02-14 RX ADMIN — ACETAMINOPHEN 650 MG: 325 TABLET ORAL at 16:09

## 2023-02-14 RX ADMIN — SODIUM CHLORIDE, POTASSIUM CHLORIDE, SODIUM LACTATE AND CALCIUM CHLORIDE: 600; 310; 30; 20 INJECTION, SOLUTION INTRAVENOUS at 01:28

## 2023-02-14 RX ADMIN — MULTIPLE VITAMINS W/ MINERALS TAB 1 TABLET: TAB at 08:25

## 2023-02-14 RX ADMIN — FOLIC ACID 1 MG: 1 TABLET ORAL at 08:25

## 2023-02-14 RX ADMIN — SODIUM CHLORIDE, POTASSIUM CHLORIDE, SODIUM LACTATE AND CALCIUM CHLORIDE: 600; 310; 30; 20 INJECTION, SOLUTION INTRAVENOUS at 16:09

## 2023-02-14 RX ADMIN — POTASSIUM CHLORIDE 20 MEQ: 1500 TABLET, EXTENDED RELEASE ORAL at 11:25

## 2023-02-14 RX ADMIN — Medication 100 MG: at 08:25

## 2023-02-14 ASSESSMENT — ACTIVITIES OF DAILY LIVING (ADL)
ADLS_ACUITY_SCORE: 35
ADLS_ACUITY_SCORE: 35
ADLS_ACUITY_SCORE: 37
ADLS_ACUITY_SCORE: 37
ADLS_ACUITY_SCORE: 35
ADLS_ACUITY_SCORE: 37
ADLS_ACUITY_SCORE: 35
ADLS_ACUITY_SCORE: 37
DEPENDENT_IADLS:: INDEPENDENT

## 2023-02-14 NOTE — PROGRESS NOTES
[facility] Progress Note  [facility] ED 2    Visited Tere and family to extend emotional support as they shared with her son about the death of father. Writer encouraged emotional reflection and normalized expressions of grieve.  Writer will cont to assess and support through duration of hospital stay     Drew Fowler M.Div., [facility]  Staff    [phone]

## 2023-02-14 NOTE — PROGRESS NOTES
[facility] MEDICINE PROGRESS NOTE      Identification/Summary: Tere Hoyt is a 42 year old female with a past medical history of alcoholism, h/o withdrawal seizures, and depression who was admitted on 2/13/2023 for acute pancreatitis. Hospital course is notable for:    Assessment and Plan:    Acute Pancreatitis  Suspect d/t alcoholism (pt. Had 1.75 L Vodka q 2 days last month. Did not drink within last week)  Lipase normal  Abdominal CT - Acute interstitial edematous pancreatitis involving the distal pancreatic body and tail. Main pancreatic duct is mildly dilated and diffusely irregular throughout the inflamed portion of the pancreas.  GI was consulted, appreciate recommendations  EUS scheduled 4-6 weeks out, Liver clinic F/U recommended.  Switched from NPO to clear liquid diet, advance as tolerated.  Continue IV Toradol and 2L NS    Severe fatty liver disease  LFTs elevated  Abdominal Ultrasound --Echogenic and mildly enlarged measuring 23 cm long. No focal mass.    Lower extremity edema, [place]  BNP normal  INR 1.19, trending down, suspect severe fibrosis/cirrhosis  LE Ultrasound pending  Continue to treat cautiously with lactated ringers    Hypokalemia, Hypomagnesemia  K+ 2.9, Mg2+ 1.8 today  Continue to monitor levels  Continue Tele monitoring   Replace PRN.    Alcohol withdrawal  Denies restlessness, N/V, diaphoresis, or hallucinations  Monitor for symptoms  Treated PRN, though she is low-risk (last drink was 5 days ago)  Continue to encourage alcohol cessation, Pt. Denied outside resources.     Hypoalbuminemia  2.5 albumin      Obesity  BMI 32.01 kg/m^2  Encourage lifestyle modifications    Bereavement  Recently lost , has not told son.   Resources provided for how to inform son  IV Ativan        # Hypokalemia: Lowest K = 2.4 mmol/L in last 2 days, will replace as needed    # Hypercalcemia: corrected calcium is >10.1, will monitor as appropriate  # Hypomagnesemia: Lowest  "Mg = 1.3 mg/dL in last 2 days, will replace as needed   # Hypoalbuminemia: Lowest albumin = 1.9 g/dL at 2/14/2023  7:14 AM, will monitor as appropriate  # Coagulation Defect: INR = 1.19 (Ref range: 0.85 - 1.15) and/or PTT = N/A, will monitor for bleeding         # Obesity: Estimated body mass index is 32.01 kg/m  as calculated from the following:    Height as of this encounter: 1.575 m (5' 2\").    Weight as of this encounter: 79.4 kg (175 lb).           Diet: Clear Liquid Diet  Fernando Catheter: Not present  Lines: None       DVT Prophylaxis:  Pneumatic Compression Devices  Code Status: Full Code    Anticipated possible discharge in 1 day.  Disposition Plan      Expected Discharge Date: 02/15/2023                The patient's care was discussed with the Attending Physician, Dr. Lezama.    [facility]  Phone: #[phone]  Securely message with the Vocera Web Console (learn more here)  Text page via [employer] Paging/Directory     Interval History/Subjective:  Tere Hoyt is a 42 year old female with a past medical history of alcoholism, h/o seizure withdrawals, and depression who was admitted on 2/13/2023 for acute pancreatitis. Pt. Presented with fatigue, myalgias, nausea and h/o several episodes of light brown vomiting, SOB w/exertion, leg swelling, lower back pain with bending forward, and abdominal cramping pain, and hematuria. Pt. Denied CP, fevers/chills, or leg pain. Pt. Initial eval significant for: tachycardia, hypotension, elevated LFTs (alk phos 204, ALT 74, AST 99), Tbili 8.0, normal BNP, normal lipase, and normal CBC. U/A was negative. Abdominal U/S showed fatty liver and normal gallbladder. CT abdomen showed acute interstitial edematous pancreatitis w/main pancreatic duct mildly elevated and diffusely irregular throughout inflamed pancreas. No necrosis, hepatic steatosis, or colon thickening. Initial treatment included: 2L NS, IV Toradol, IV " ativan, Mg2+ and K+ replacement, and tele monitoring.    Overnight, no significant events. Pt. States back pain has improved 70% and denies pain with bending forward, denies SOB or CP. Pt. Denies any diaphoresis, H/As or hallucinations. Pt. Was switched from NPO to clear liquid diet and advancement as tolerated today. LE US D-dimer pending. Pt. Has phone appt. With PCP at 1:40PM today and requests no interruption until after 2:00PM.     Physical Exam/Objective:  Temp:  [98.2  F (36.8  C)-98.4  F (36.9  C)] 98.4  F (36.9  C)  Pulse:  [] 104  Resp:  [9-39] 39  BP: ()/(47-76) 104/74  SpO2:  [86 %-100 %] 97 %  Body mass index is 32.01 kg/m .    GENERAL:  Alert, appears comfortable, in no acute distress, appears stated age   HEAD:  Normocephalic, without obvious abnormality, atraumatic   EYES:  Positive for scleral icterus, conjunctiva/corneas clear, EOM's intact   NECK: Supple, symmetrical, trachea midline   BACK:   Symmetric, no curvature, ROM normal   LUNGS:   Clear to auscultation bilaterally, no rales, rhonchi, or wheezing, symmetric chest rise on inhalation, respirations unlabored   CHEST WALL:  No tenderness or deformity   HEART:  Regular rate and rhythm, S1 and S2 normal, no murmur, rub, or gallop    ABDOMEN:   Slightly distended, soft and TTP to right abdomen and epigastric abdomen, bowel sounds active all four quadrants, no masses, no organomegaly, no rebound or guarding. Negative for Reed's or Washburn Curry's signs.    EXTREMITIES: Lower right leg ttp. 1+ pitting edema. Extremities normal, atraumatic, no cyanosis.    SKIN: Negative for jaundice. Dry to touch, no exanthems in the visualized areas.   NEURO: Alert, oriented x3, moves all four extremities freely   PSYCH: Appears distressed when speaking about 's passing. Cooperative, behavior is appropriate      Data reviewed today: I personally reviewed all new medications, labs, imaging/diagnostics reports over the past 24 hours. Pertinent  findings include:    Imaging:   Recent Results (from the past 24 hour(s))   CT Abdomen Pelvis w Contrast    Narrative    EXAM: CT ABDOMEN PELVIS W CONTRAST  LOCATION: [facility]  DATE/TIME: 2/13/2023 5:46 PM    INDICATION: Abdominal pain.  COMPARISON: CT abdomen pelvis 05/17/2021.  TECHNIQUE: CT scan of the abdomen and pelvis was performed following injection of IV contrast. Multiplanar reformats were obtained. Dose reduction techniques were used.  CONTRAST: ISOVUE 370 90 mL    FINDINGS:   LOWER CHEST: Subsegmental atelectasis.    HEPATOBILIARY: Severe diffuse hepatic steatosis. No focal liver lesions or morphologic features of cirrhosis. No calcified gallstones. No biliary ductal dilation.    PANCREAS: Findings suggestive of acute interstitial edematous pancreatitis, with edema throughout the body and tail, mild ductal dilation and irregularity, and mild stranding of the peripancreatic fat. No fluid collections or areas of pancreatic   parenchymal necrosis.    SPLEEN: Normal.    ADRENAL GLANDS: Benign bilateral adrenal calcifications, greater on the left.    KIDNEYS/BLADDER: No hydronephrosis. No urinary calculi. Normal urinary bladder.    BOWEL: No bowel obstruction. Appendix is normal. Mild wall thickening of the ascending colon, which is decompressed, improved since 2021. Scattered ascending colonic diverticuli. Remainder of the colon is normal.    LYMPH NODES: No enlarged lymph node.    VASCULATURE: Patent portal, splenic, and superior mesenteric veins. Normal caliber abdominal aorta.    PELVIC ORGANS: IUD within the uterus. No adnexal mass.    MUSCULOSKELETAL: No acute bony abnormality.      Impression    IMPRESSION:     1.  Acute interstitial edematous pancreatitis involving the distal pancreatic body and tail. Main pancreatic duct is mildly dilated and diffusely irregular throughout the inflamed portion of the pancreas.    2.  No peripancreatic fluid collections or evidence of  pancreatic parenchymal necrosis.    3.  No calcified gallstones or biliary ductal dilation.    4.  Severe diffuse hepatic steatosis, unchanged.    5.  Mild wall thickening of the ascending colon, improved since 2021, and of uncertain etiology. This could be pseudothickening from underdistention, portal colopathy in the setting of liver disease, or a mild infectious/inflammatory colitis.   Abdomen US, limited (RUQ only)    Narrative    EXAM: US ABDOMEN LIMITED  LOCATION: [facility]  DATE/TIME: 2/13/2023 7:05 PM    INDICATION: pancreatitis and dilated pancreatic duct  COMPARISON: CT abdomen pelvis 02/13/2023  TECHNIQUE: Limited abdominal ultrasound.    FINDINGS:    GALLBLADDER: Normal. No gallstones, wall thickening, or pericholecystic fluid. Negative sonographic Das's sign.    BILE DUCTS: No biliary dilatation. The common duct measures mm.    LIVER: Echogenic and mildly enlarged measuring 23 cm long. No focal mass.    RIGHT KIDNEY: No hydronephrosis.    PANCREAS: The visualized portions are normal.    No ascites.      Impression    IMPRESSION:  1.  Pelvic steatosis and moderate hepatomegaly.           Labs:  Most Recent 3 CBC's:Recent Labs   Lab Test 02/14/23  0714 02/13/23  1500 05/17/21  1406   WBC 7.8 8.7 8.7   HGB 10.5* 12.2 13.0   MCV 98 98 106*    225 285     Most Recent 3 BMP's:Recent Labs   Lab Test 02/14/23  0714 02/13/23  1500 05/17/21  1406    134* 138   POTASSIUM 2.9* 2.4* 2.9*   CHLORIDE 101 92* 103   CO2 27 29 24   BUN 3* 4* 3*   CR 0.56* 0.59* 0.50*   ANIONGAP 9 13 11   JUSTICE 7.8* 8.6 7.9*   GLC 95 115 116     Most Recent 2 LFT's:Recent Labs   Lab Test 02/14/23  0714 02/13/23  1500   AST 79* 99*   ALT 48* 74*   ALKPHOS 156* 204*   BILITOTAL 7.1* 8.0*     Most Recent 3 INR's:Recent Labs   Lab Test 02/14/23  0714 02/13/23  1500 05/17/21  1638   INR 1.19* 1.13 1.09     Most Recent 3 Creatinines:Recent Labs   Lab Test 02/14/23  0714 02/13/23  1500 05/17/21  1406   CR  0.56* 0.59* 0.50*       Most Recent D-dimer:Recent Labs   Lab Test 02/13/23  1500   DD 1.52*     Most Recent TSH and T4:Recent Labs   Lab Test 02/13/23 2038   TSH 6.39*   T4 1.23       Medications:   Personally Reviewed.  Medications     lactated ringers 100 mL/hr at 02/14/23 0355       folic acid  1 mg Oral Daily     multivitamin w/minerals  1 tablet Oral Daily     sodium chloride (PF)  3 mL Intracatheter Q8H     thiamine  100 mg Oral Daily     2/14/2023   [facility] :Faculty Attestation   I have seen and examined the patient.   I have discussed the case with Melissa Guevara.  I agree with the findings, assessment and plan.   Roni Lezama MD

## 2023-02-14 NOTE — CONSULTS
"GI CONSULT NOTE      Name: Tere Hoty  Medical Record #: [id number]  YOB: 1980  Date of Admission: 2/13/2023  Date/Time: 2/14/2023/7:52 AM     CHIEF COMPLAINT: Weakness, edema, abdominal pain    HISTORY OF PRESENT ILLNESS: We were asked to see Tere Hoyt by Dr. Rollins for pancreatitis, liver disease.     Tere Hoyt is a 42 year old year old female with history of alcoholic hepatitis, obesity, depression, C difficile who presented to the ED yesterday. She reports she has been ill for the past month, and staying at home in bed due to vomiting, body aches, fatigue and back pain. She reports drinking 1.75L of vodka every 2 days during this time, chasing with Ginger ale or water. She was not able to eat much if any food without vomiting during this time. She \"slowed down\" her drinking \"about 75%\" about a week ago, and she started to feel a bit better. She was able to get out of bed and eat. However, over the weekend, she had recurrence of vomiting (brown), bloating after eating and increased upper back pain. She also noted LE edema and hematuria.     ED evaluation showed normal lipase, elevated LFTs: AST 99, ALT 74, alk phos 204, total bilirubin 8.0. INR 1.19. WBC normal, hemoglobin 10.8. CT abdomen and pelvis showed signs of pancreatitis, with mildly dilated and diffusely irregular main pancreatic duct. RUQ US showed normal CBD, gallbladder, with severe hepatic steatosis. She was treated with lactated ringers, LFTs mildly improved today.     She has a long history of alcohol abuse and has been in treatment in the past (3 times, including at [facility]). She has had similar LFT pattern 5/2021, acute hepatitis panel negative at that time. She was sober for about a year, then relapsed in the spring of 2022. Then sober again 7/30-11/25/22. She reports to me today that she feels can get sober and maintain her sobriety on her own.     REVIEW OF SYSTEMS (ROS): Complete review of systems negative " "other than listed in HPI.    PAST MEDICAL HISTORY:  ETOH hepatitis   Obesity   Depression  Tobacco use disorder  C. Difficile     FAMILY HISTORY:  History reviewed. No pertinent family history.    SOCIAL HISTORY:  Social History     Socioeconomic History     Marital status:      Spouse name: Not on file     Number of children: Not on file     Years of education: Not on file     Highest education level: Not on file   Occupational History     Not on file   Tobacco Use     Smoking status: Not on file     Smokeless tobacco: Not on file   Substance and Sexual Activity     Alcohol use: Not on file     Drug use: Not on file     Sexual activity: Not on file   Other Topics Concern     Not on file   Social History Narrative     Not on file     Social Determinants of Health     Financial Resource Strain: Not on file   Food Insecurity: Not on file   Transportation Needs: Not on file   Physical Activity: Not on file   Stress: Not on file   Social Connections: Not on file   Intimate Partner Violence: Not on file   Housing Stability: Not on file     MEDICATIONS PRIOR TO ADMISSION: (Not in a hospital admission)       ALLERGIES: Patient has no known allergies.    PHYSICAL EXAM:    /66 (BP Location: Left arm, Patient Position: Supine)   Pulse 91   Temp 98.4  F (36.9  C) (Oral)   Resp 23   Ht 1.575 m (5' 2\")   Wt 79.4 kg (175 lb)   SpO2 94%   BMI 32.01 kg/m      GENERAL: Pleasant, no obvious distress  NECK: Supple without adenopathy  EYES: Bilateral scleral icterus  LUNGS: Clear to auscultation bilaterally  HEART: Regular rate and rhythm, S1 and S2 present, no lower extremity edema  ABDOMEN: Non-distended. Positive bowel sounds. Soft, non-tender, no guarding/rebound/mass, no obvious organomegaly  MUSKULOSKELETAL:  Warm and well perfused, moves all extremities well  SKIN: Jaundiced  NEUROLOGIC: Alert and oriented  PSYCHIATRIC: Normal affect, no asterixis     LAB DATA:  CMP Results:   Recent Labs   Lab Test " 02/13/23  1500 05/17/21  1406   * 138   POTASSIUM 2.4* 2.9*   CHLORIDE 92* 103   CO2 29 24   ANIONGAP 13 11    116   BUN 4* 3*   CR 0.59* 0.50*   BILITOTAL 8.0* 8.9*   ALKPHOS 204* 210*   ALT 74* 54*   AST 99* 145*      CBC  Recent Labs   Lab 02/14/23  0714 02/13/23  1500   WBC 7.8 8.7   RBC 3.13* 3.66*   HGB 10.5* 12.2   HCT 30.6* 36.0   MCV 98 98   MCH 33.5* 33.3*   MCHC 34.3 33.9   RDW 25.7* 25.2*    225     INR  Recent Labs   Lab 02/14/23  0714 02/13/23  1500   INR 1.19* 1.13      Lipase   Date Value Ref Range Status   02/13/2023 23 0 - 52 U/L Final   05/17/2021 26 0 - 52 U/L Final     IMAGING:  EXAM: US ABDOMEN LIMITED  LOCATION: [facility]  DATE/TIME: 2/13/2023 7:05 PM     INDICATION: pancreatitis and dilated pancreatic duct  COMPARISON: CT abdomen pelvis 02/13/2023  TECHNIQUE: Limited abdominal ultrasound.     FINDINGS:     GALLBLADDER: Normal. No gallstones, wall thickening, or pericholecystic fluid. Negative sonographic Das's sign.  BILE DUCTS: No biliary dilatation. The common duct measures mm.  LIVER: Echogenic and mildly enlarged measuring 23 cm long. No focal mass.  RIGHT KIDNEY: No hydronephrosis.  PANCREAS: The visualized portions are normal.  No ascites.                                        IMPRESSION:  1.  Pelvic steatosis and moderate hepatomegaly.    EXAM: CT ABDOMEN PELVIS W CONTRAST  LOCATION: [facility]  DATE/TIME: 2/13/2023 5:46 PM     INDICATION: Abdominal pain.  COMPARISON: CT abdomen pelvis 05/17/2021.  TECHNIQUE: CT scan of the abdomen and pelvis was performed following injection of IV contrast. Multiplanar reformats were obtained. Dose reduction techniques were used.  CONTRAST: ISOVUE 370 90 mL     FINDINGS:   LOWER CHEST: Subsegmental atelectasis.     HEPATOBILIARY: Severe diffuse hepatic steatosis. No focal liver lesions or morphologic features of cirrhosis. No calcified gallstones. No biliary ductal  dilation.  PANCREAS: Findings suggestive of acute interstitial edematous pancreatitis, with edema throughout the body and tail, mild ductal dilation and irregularity, and mild stranding of the peripancreatic fat. No fluid collections or areas of pancreatic   parenchymal necrosis.  SPLEEN: Normal.  ADRENAL GLANDS: Benign bilateral adrenal calcifications, greater on the left.  KIDNEYS/BLADDER: No hydronephrosis. No urinary calculi. Normal urinary bladder.  BOWEL: No bowel obstruction. Appendix is normal. Mild wall thickening of the ascending colon, which is decompressed, improved since 2021. Scattered ascending colonic diverticuli. Remainder of the colon is normal  LYMPH NODES: No enlarged lymph node.  VASCULATURE: Patent portal, splenic, and superior mesenteric veins. Normal caliber abdominal aorta.  PELVIC ORGANS: IUD within the uterus. No adnexal mass.  MUSCULOSKELETAL: No acute bony abnormality.                                          IMPRESSION:  1.  Acute interstitial edematous pancreatitis involving the distal pancreatic body and tail. Main pancreatic duct is mildly dilated and diffusely irregular throughout the inflamed portion of the pancreas.  2.  No peripancreatic fluid collections or evidence of pancreatic parenchymal necrosis.  3.  No calcified gallstones or biliary ductal dilation.  4.  Severe diffuse hepatic steatosis, unchanged.  5.  Mild wall thickening of the ascending colon, improved since 2021, and of uncertain etiology. This could be pseudothickening from underdistention, portal colopathy in the setting of liver disease, or a mild infectious/inflammatory colitis.    ASSESSMENT:  1. Acute pancreatitis  42 year old female with history of alcohol abuse, alcoholic pancreatitis, depression who presented with abdominal pain, back pain and LE edema. CT findings c/w acute pancreatitis, likely due to alcohol. Per Van's criteria, this is not severe pancreatitis. Treatment is supportive with lactated  ringers, diet as tolerated, alcohol cessation.     CT scan showed mild dilation and irregularly of pancreatic duct. Discussed with biliary provider, will plan for EUS in 4-6 weeks, patient agreeable.     2. Alcoholic hepatitis  LFTs a bit better today. [place] DF 10, which does not meet criteria for treatment with steroids. Abstinence is mainstay of treatment, recommend chem dep consult. Albumin is quite low, will consult RD as well. Her INR is mildly elevated and platelet count has been dropping, which is concerning for severe fibrosis or cirrhosis. Recommend liver clinic follow-up as outpatient. Of note, patient wishes to follow with [facility] whom she says she's seen before due to Insurance.     PLAN:  1. Clear liquid diet, advance as tolerated  2. Lactated ringers  3. Trend LFTs, INR, creatinine  4. EUS in 4-6 weeks, our office will arrange (may need to be done at [facility] pending insurance coverage)  5. RD consult  6. Chem dep consult  7. Strict alcohol cessation  8. Follow-up with hepatology as outpatient, patient prefers [facility].     Discussed with Dr. Cochran who will also visit with the patient.     TIME SPENT: 60 min including chart review, patient interview and care coordination.                                                Irina Parikh PA-C  Thank you for the opportunity to participate in the care of this patient.   Please feel free to call me with any questions or concerns.  Phone number [phone].

## 2023-02-14 NOTE — PROGRESS NOTES
SPIRITUAL HEALTH SERVICES Progress Note  [facility]    Saw pt Tere Hoyt per patient request    Patient/Family Understanding of Illness and Goals of Care - Writer reviewed chart for diagnose patient admitted for fatigue, myalgias, poor appetite, nausea, emesis, low back and abdominal pain, shortness of breath with exertion, leg swelling.  Patient shared she has a history of alcohol abuse.   Named goal of care was long term sobriety.    Distress and Loss - Tere's  unexpectedly  and she is emotionally distressed about how to inform her 9 year old son of his death.  Tere was  from her  and had a complicated relationship due to mutual substance abuse and complicated family dynamics.     Strengths, Coping, and Resources - Writer created space for her to reflect on her feelings surrounding her husbands death and made space for Tere to explore how to break the news to her son.    Meaning, Beliefs, and Spirituality - Tere has engaged in AA groups and attends [facility]     Plan of Care - Con to to assess and support daily      Drew Fowler M.Div., [facility]  Staff    [phone]

## 2023-02-14 NOTE — PHARMACY-ADMISSION MEDICATION HISTORY
Pharmacy Note - Admission Medication History    Pertinent Provider Information:     Patient stopped taking her medications about 2 months ago, including: acamprosate 666 mg three times daily and escitalopram 5 mg daily. Prescribed buspirone in Dec 2022 but did not plan to start taking. Currently only vitamin D and Mirena IUD.     ______________________________________________________________________    Prior To Admission (PTA) med list completed and updated in EMR.       PTA Med List   Medication Sig Note Last Dose     levonorgestrel (MIRENA) 20 MCG/DAY IUD 1 each by Intrauterine route  in place     Vitamin D3 (CHOLECALCIFEROL) 125 MCG (5000 UT) tablet Take 125 mcg by mouth daily as needed 2/13/2023: Takes intermittently. Past Week       Information source(s): Patient and CareEverywhere/[facility]  Method of interview communication: in-person    Summary of Changes to PTA Med List  New: vitamin D 5000 units, Mirena  Discontinued: acamprosate, escitalopram, trazodone, Valtrex, buspar  Changed: none    Patient was asked about OTC/herbal products specifically.  PTA med list reflects this.    In the past week, patient estimated taking medication this percent of the time:  less than 50% due to other.    Medication Affordability:  Not including over the counter (OTC) medications, was there a time in the past 12 months when you did not take your medications as prescribed because of cost?: Unable to Assess (visitors in room, patient in pain)    Allergies were reviewed, assessed, and updated with the patient.      Patient does not use any multi-dose medications prior to admission.    The information provided in this note is only as accurate as the sources available at the time of the update(s).    Thank you for the opportunity to participate in the care of this patient.    Annalisa Hardy [facility]  2/13/2023 7:45 PM

## 2023-02-14 NOTE — PROGRESS NOTES
Vss other than HR which has been running tachy in the low 100's. A&O. Pain r/t lower back and controlled with tylenol. Remains on K and Mg protocols which are a recheck in the AM. PIV running LR @100 mL/hr. CIWA scoring 3 r/t tremors. Nurse to nurse handoff completed.

## 2023-02-14 NOTE — PLAN OF CARE
Problem: Alcohol Withdrawal  Goal: Alcohol Withdrawal Symptom Control  Outcome: Progressing     Problem: Electrolyte Imbalance  Goal: Electrolyte Imbalance: Plan of Care  Outcome: Progressing   Goal Outcome Evaluation:      Plan of Care Reviewed With: patient      On CIWA protocol scored 3 due to hand tremors. She is alert and oriented x4 denies nausea. Was seen by GI now on clear liquid diet.  On potassium and magnesium replacement protocol. Potassium replaced recheck at 1500. Magnesium replaced now within normal range will recheck again tomorrow morning.  Angel Valdez RN  2/14/2023  2:42 PM

## 2023-02-14 NOTE — UTILIZATION REVIEW
Admission Status; Secondary Review Determination   Under the authority of the Utilization Management Committee, the utilization review process indicated a secondary review on Tere Hoyt. The review outcome is based on review of the medical records, discussions with staff, and applying clinical experience noted on the date of the review.   (x) Inpatient Status Appropriate - This patient's medical care is consistent with medical management for inpatient care and reasonable inpatient medical practice.     RATIONALE FOR DETERMINATION   Admitted with pancreatitis seen on CT scan.  LFTs elevated.  Started on IV fluids and IV pain control and GI consulted.  Will restart clear liquid and advance as tolerated.  Will need outpatient GI follow-up and outpatient hepatology follow-up.  Admitting potassium was 2.4 and despite replacement is 2.9 today requiring further replacement.  Potassium on admission was 1.3 and this is being monitored.    At the time of admission with the information available to the attending physician more than 2 nights Hospital complex care was anticipated, based on patient risk of adverse outcome if treated as outpatient and complex care required. Inpatient admission is appropriate based on the Medicare guidelines.   The information on this document is developed by the utilization review team in order for the business office to ensure compliance. This only denotes the appropriateness of proper admission status and does not reflect the quality of care rendered.   The definitions of Inpatient Status and Observation Status used in making the determination above are those provided in the CMS Coverage Manual, Chapter 1 and Chapter 6, section 70.4.   Sincerely,   Jose Angel Matthews MD  Utilization Review  Physician Advisor  [facility]

## 2023-02-14 NOTE — H&P
[facility] MEDICINE ADMISSION HISTORY AND PHYSICAL     Brief Synopsis:     Tere Hoyt is a 42 year old female who presented with complaints of fatigue, myalgias, poor appetite, nausea, emesis, low back and abdominal pain, shortness of breath with exertion, leg swelling.    Medical history is notable for alcohol abuse with history of seizure withdrawal, depression.    Initial evaluation revealed slight tachycardia, mild hypotension, potassium of 2.4, magnesium 1.3, albumin 2.5, alk phos of 204, ALT 74, AST 99, total bilirubin 8.0, normal BNP, normal lipase.  CBC within normal limits.  Urinalysis negative for infection.  Abdominal ultrasound showed normal gallbladder, fatty liver.  CT abdomen with acute interstitial edematous pancreatitis with main pancreatic duct mildly dilated and diffusely irregular throughout the inflamed portion of the pancreas, no evidence of pancreatic necrosis, severe hepatic steatosis and mild wall thickening of the ascending colon.    Initial treatment included 2 L of normal saline, IV Toradol, IV Ativan, magnesium, potassium.    Assessment and Plan:  Fatigue, myalgias, poor appetite, nausea and vomiting  Acute pancreatitis by imaging, lipase normal  Severe fatty liver disease  Lower extremity edema, [place], nl BNP  Hypoalbuminemia  Abnormal transaminases  Hyperbilirubinemia  Hypokalemia  Hypomagnesemia  Hypotension  Most likely related to alcohol abuse, says last drink was 3 to 4 days ago  Check cortisol, TSH, COVID/influenza/RSV  Check INR, D-dimer  Possibly check lower extremity ultrasound pending D-dimer  Replace electrolyte deficiencies, monitor on telemetry  Monitor and treat for alcohol withdrawal as needed, seems low risk  GI consult for tomorrow regarding the pancreas findings, fatty liver disease  Stressed importance of alcohol cessation  Offered treatment resources if needed  Monitor on telemetry  Treat with lactated Ringer's although cautiously given  "dyspnea, edema    Clinically Significant Risk Factors Present on Admission        # Hypokalemia: Lowest K = 2.4 mmol/L in last 2 days, will replace as needed     # Hypomagnesemia: Lowest Mg = 1.3 mg/dL in last 2 days, will replace as needed   # Hypoalbuminemia: Lowest albumin = 2.5 g/dL at 2/13/2023  3:00 PM, will monitor as appropriate          # Obesity: Estimated body mass index is 32.01 kg/m  as calculated from the following:    Height as of this encounter: 1.575 m (5' 2\").    Weight as of this encounter: 79.4 kg (175 lb).             DVTP: Mechanical Prophylaxis/ Sequential Compression Devices  Code Status: Full Code  Disposition: Inpatient   Diet: N.p.o.  Fluids: Lactated Ringer's at 125/h    Disposition Plan      Expected Discharge Date: 02/15/2023               Chief Complaint  malaise, fatigue, body aches, nausea and vomiting, abdominal pain, back pain     HISTORY   Tere Hoyt is a 42 year old female who presented with multiple complaints.    Per ED provider:  Tere Hoyt is a 42 year old female with a pertinent history of anxiety, depression, alcoholism, alcohol withdrawal seizure, tobacco dependence, who presents for evaluation of leg swelling, back pain.     Patient reports she has been bedridden for the past ~1 month because she had been feeling ill with symptoms including vomiting and body aches. She was not medically evaluated because she didn't have transportation. At some point while bedridden she developed low back pain which has been constant since onset. Pain is exacerbated when she stands up straight after bending over. Since 2/11 (2 days ago), she has been applying Salonpas patches and BioFreeze, and taking epsom salt baths. She has experienced similar back pain in the past, but never this persistent. On 2/6 (7 days ago) she finally started feeling better and began moving around more to get her strength back, and started eating again. She has been eating salads and fruits, and " drinking fluids. Over this past weekend, she had a few episodes of vomiting, bringing up minimal amounts of light brown fluid. Her abdomen has been distended and lower abdomen is crampy. This morning she noticed blood in her urine. On 2/10 (3 days ago), the patient states she was very active. The next afternoon , she noted bilateral ankle swelling which has since worsened and spread to her lower legs (R>L). No fevers, leg pain.      Additionally, patient reports she received word her estranged  passed away just prior to arrival today. She feels very anxious and overwhelmed. Her parents are coming to town to help her this week. Patient lives at home with her son. Scheduled for phone appointment with PCP tomorrow, but unsure if she will follow through with everything she has going on.     Denies significant chest pain.  Said that she did have some black stools a while ago but these have resolved.  She was drinking fairly heavily up until maybe 1 to 2 weeks ago.  She said that in the end of November she had a lot of life stressors that may be led to increased drinking.  Past Medical History     Reviewed above    Surgical History     LEEP PROCEDURE 2007       Family History      Good Health Father        Diabetes Maternal Grandfather        Hypertension Maternal Grandfather        Good Health Mother          Social History        Allergies   No Known Allergies  Prior to Admission Medications      Prior to Admission Medications   Prescriptions Last Dose Informant Patient Reported? Taking?   Vitamin D3 (CHOLECALCIFEROL) 125 MCG (5000 UT) tablet Past Week  Yes Yes   Sig: Take 125 mcg by mouth daily as needed   levonorgestrel (MIRENA) 20 MCG/DAY IUD in place  Yes Yes   Si each by Intrauterine route      Facility-Administered Medications: None      Review of Systems     A 12 point comprehensive review of systems was negative except as noted above in HPI.    PHYSICAL EXAMINATION     Vitals      Temp:   [98.2  F (36.8  C)] 98.2  F (36.8  C)  Pulse:  [] 105  Resp:  [17-37] 30  BP: (89-96)/(56-76) 89/56  SpO2:  [94 %-100 %] 96 %    Examination   Physical Exam:    Gen: no acute distress, comfortable  ENT: Mild scleral icterus  Pulm: lungs are clear without wheezing, crackles, breathing comfortably at rest  CV: regular rate and rhythm, mild lower extremity edema  GI: abdomen is soft, tender to palpation, mildly distended  MSK: no obvious deformities of the extremities  Derm: Not pale, no jaundice  Psych: Depressed affect, does not appear anxious or tremulous, did recently get a dose of IV Ativan      Pertinent Radiology     Radiology Results:   Recent Results (from the past 24 hour(s))   CT Abdomen Pelvis w Contrast    Narrative    EXAM: CT ABDOMEN PELVIS W CONTRAST  LOCATION: [facility]  DATE/TIME: 2/13/2023 5:46 PM    INDICATION: Abdominal pain.  COMPARISON: CT abdomen pelvis 05/17/2021.  TECHNIQUE: CT scan of the abdomen and pelvis was performed following injection of IV contrast. Multiplanar reformats were obtained. Dose reduction techniques were used.  CONTRAST: ISOVUE 370 90 mL    FINDINGS:   LOWER CHEST: Subsegmental atelectasis.    HEPATOBILIARY: Severe diffuse hepatic steatosis. No focal liver lesions or morphologic features of cirrhosis. No calcified gallstones. No biliary ductal dilation.    PANCREAS: Findings suggestive of acute interstitial edematous pancreatitis, with edema throughout the body and tail, mild ductal dilation and irregularity, and mild stranding of the peripancreatic fat. No fluid collections or areas of pancreatic   parenchymal necrosis.    SPLEEN: Normal.    ADRENAL GLANDS: Benign bilateral adrenal calcifications, greater on the left.    KIDNEYS/BLADDER: No hydronephrosis. No urinary calculi. Normal urinary bladder.    BOWEL: No bowel obstruction. Appendix is normal. Mild wall thickening of the ascending colon, which is decompressed, improved since 2021.  Scattered ascending colonic diverticuli. Remainder of the colon is normal.    LYMPH NODES: No enlarged lymph node.    VASCULATURE: Patent portal, splenic, and superior mesenteric veins. Normal caliber abdominal aorta.    PELVIC ORGANS: IUD within the uterus. No adnexal mass.    MUSCULOSKELETAL: No acute bony abnormality.      Impression    IMPRESSION:     1.  Acute interstitial edematous pancreatitis involving the distal pancreatic body and tail. Main pancreatic duct is mildly dilated and diffusely irregular throughout the inflamed portion of the pancreas.    2.  No peripancreatic fluid collections or evidence of pancreatic parenchymal necrosis.    3.  No calcified gallstones or biliary ductal dilation.    4.  Severe diffuse hepatic steatosis, unchanged.    5.  Mild wall thickening of the ascending colon, improved since 2021, and of uncertain etiology. This could be pseudothickening from underdistention, portal colopathy in the setting of liver disease, or a mild infectious/inflammatory colitis.   Abdomen US, limited (RUQ only)    Narrative    EXAM: US ABDOMEN LIMITED  LOCATION: [facility]  DATE/TIME: 2/13/2023 7:05 PM    INDICATION: pancreatitis and dilated pancreatic duct  COMPARISON: CT abdomen pelvis 02/13/2023  TECHNIQUE: Limited abdominal ultrasound.    FINDINGS:    GALLBLADDER: Normal. No gallstones, wall thickening, or pericholecystic fluid. Negative sonographic Das's sign.    BILE DUCTS: No biliary dilatation. The common duct measures mm.    LIVER: Echogenic and mildly enlarged measuring 23 cm long. No focal mass.    RIGHT KIDNEY: No hydronephrosis.    PANCREAS: The visualized portions are normal.    No ascites.      Impression    IMPRESSION:  1.  Pelvic steatosis and moderate hepatomegaly.         EKG Results: personally reviewed.     Álvaro Rollins DO  [facility]  [facility]   Phone: #[phone]

## 2023-02-14 NOTE — ED NOTES
Pt is back from ultrasound, is very emotional, on the phone talking to family most of the visit. RN has to remind her frequently to keep her arm straight for IV fluids and potassium infusion.

## 2023-02-15 VITALS
WEIGHT: 181.8 LBS | HEIGHT: 62 IN | TEMPERATURE: 98.2 F | OXYGEN SATURATION: 98 % | RESPIRATION RATE: 18 BRPM | SYSTOLIC BLOOD PRESSURE: 107 MMHG | HEART RATE: 89 BPM | DIASTOLIC BLOOD PRESSURE: 64 MMHG | BODY MASS INDEX: 33.45 KG/M2

## 2023-02-15 LAB
ALBUMIN SERPL-MCNC: 2 G/DL (ref 3.5–5)
ALP SERPL-CCNC: 161 U/L (ref 45–120)
ALT SERPL W P-5'-P-CCNC: 50 U/L (ref 0–45)
ANION GAP SERPL CALCULATED.3IONS-SCNC: 9 MMOL/L (ref 5–18)
AST SERPL W P-5'-P-CCNC: 87 U/L (ref 0–40)
BILIRUB DIRECT SERPL-MCNC: 4.5 MG/DL
BILIRUB SERPL-MCNC: 7.5 MG/DL (ref 0–1)
BUN SERPL-MCNC: 2 MG/DL (ref 8–22)
CALCIUM SERPL-MCNC: 7.8 MG/DL (ref 8.5–10.5)
CHLORIDE BLD-SCNC: 102 MMOL/L (ref 98–107)
CO2 SERPL-SCNC: 23 MMOL/L (ref 22–31)
CREAT SERPL-MCNC: 0.51 MG/DL (ref 0.6–1.1)
GFR SERPL CREATININE-BSD FRML MDRD: >90 ML/MIN/1.73M2
GLUCOSE BLD-MCNC: 86 MG/DL (ref 70–125)
HGB BLD-MCNC: 10.9 G/DL (ref 11.7–15.7)
HOLD SPECIMEN: NORMAL
LIPASE SERPL-CCNC: 27 U/L (ref 0–52)
MAGNESIUM SERPL-MCNC: 1.8 MG/DL (ref 1.8–2.6)
POTASSIUM BLD-SCNC: 3.4 MMOL/L (ref 3.5–5)
POTASSIUM BLD-SCNC: 3.6 MMOL/L (ref 3.5–5)
PROT SERPL-MCNC: 5.4 G/DL (ref 6–8)
SODIUM SERPL-SCNC: 134 MMOL/L (ref 136–145)

## 2023-02-15 PROCEDURE — 83735 ASSAY OF MAGNESIUM: CPT | Performed by: FAMILY MEDICINE

## 2023-02-15 PROCEDURE — 99239 HOSP IP/OBS DSCHRG MGMT >30: CPT | Performed by: FAMILY MEDICINE

## 2023-02-15 PROCEDURE — 83690 ASSAY OF LIPASE: CPT | Performed by: FAMILY MEDICINE

## 2023-02-15 PROCEDURE — 36415 COLL VENOUS BLD VENIPUNCTURE: CPT | Performed by: FAMILY MEDICINE

## 2023-02-15 PROCEDURE — 82248 BILIRUBIN DIRECT: CPT | Performed by: FAMILY MEDICINE

## 2023-02-15 PROCEDURE — 250N000013 HC RX MED GY IP 250 OP 250 PS 637: Performed by: FAMILY MEDICINE

## 2023-02-15 PROCEDURE — 85018 HEMOGLOBIN: CPT | Performed by: FAMILY MEDICINE

## 2023-02-15 PROCEDURE — 82374 ASSAY BLOOD CARBON DIOXIDE: CPT | Performed by: FAMILY MEDICINE

## 2023-02-15 PROCEDURE — 250N000013 HC RX MED GY IP 250 OP 250 PS 637: Performed by: HOSPITALIST

## 2023-02-15 PROCEDURE — 84132 ASSAY OF SERUM POTASSIUM: CPT | Performed by: FAMILY MEDICINE

## 2023-02-15 PROCEDURE — 82435 ASSAY OF BLOOD CHLORIDE: CPT | Performed by: FAMILY MEDICINE

## 2023-02-15 RX ORDER — MULTIPLE VITAMINS W/ MINERALS TAB 9MG-400MCG
1 TAB ORAL DAILY
COMMUNITY
Start: 2023-02-16 | End: 2024-04-07

## 2023-02-15 RX ORDER — LANOLIN ALCOHOL/MO/W.PET/CERES
100 CREAM (GRAM) TOPICAL DAILY
Qty: 55 TABLET | Refills: 0 | Status: SHIPPED | OUTPATIENT
Start: 2023-02-16 | End: 2023-04-12

## 2023-02-15 RX ORDER — POTASSIUM CHLORIDE 1500 MG/1
40 TABLET, EXTENDED RELEASE ORAL ONCE
Status: COMPLETED | OUTPATIENT
Start: 2023-02-15 | End: 2023-02-15

## 2023-02-15 RX ORDER — FOLIC ACID 0.8 MG
800 TABLET ORAL DAILY
Qty: 10 TABLET | Refills: 0 | Status: SHIPPED | OUTPATIENT
Start: 2023-02-15 | End: 2023-02-25

## 2023-02-15 RX ADMIN — ACETAMINOPHEN 650 MG: 325 TABLET ORAL at 00:19

## 2023-02-15 RX ADMIN — MULTIPLE VITAMINS W/ MINERALS TAB 1 TABLET: TAB at 08:40

## 2023-02-15 RX ADMIN — POTASSIUM CHLORIDE 40 MEQ: 1500 TABLET, EXTENDED RELEASE ORAL at 08:40

## 2023-02-15 RX ADMIN — Medication 100 MG: at 08:40

## 2023-02-15 RX ADMIN — FOLIC ACID 1 MG: 1 TABLET ORAL at 08:40

## 2023-02-15 RX ADMIN — LORAZEPAM 1 MG: 1 TABLET ORAL at 00:19

## 2023-02-15 ASSESSMENT — ACTIVITIES OF DAILY LIVING (ADL)
ADLS_ACUITY_SCORE: 22
DOING_ERRANDS_INDEPENDENTLY_DIFFICULTY: YES
ADLS_ACUITY_SCORE: 28
WALKING_OR_CLIMBING_STAIRS_DIFFICULTY: NO
TOILETING_ISSUES: NO
ADLS_ACUITY_SCORE: 22
CHANGE_IN_FUNCTIONAL_STATUS_SINCE_ONSET_OF_CURRENT_ILLNESS/INJURY: NO
ADLS_ACUITY_SCORE: 37
CONCENTRATING,_REMEMBERING_OR_MAKING_DECISIONS_DIFFICULTY: NO
ADLS_ACUITY_SCORE: 22
DRESSING/BATHING_DIFFICULTY: NO
ADLS_ACUITY_SCORE: 28
ADLS_ACUITY_SCORE: 22
WEAR_GLASSES_OR_BLIND: NO
ADLS_ACUITY_SCORE: 28
DIFFICULTY_EATING/SWALLOWING: NO
FALL_HISTORY_WITHIN_LAST_SIX_MONTHS: NO

## 2023-02-15 NOTE — PROGRESS NOTES
"GI PROGRESS NOTE  2/15/2023  Tere Hoyt  1980  /-16    Subjective:   Feeling improved today. Still bloated, back pain improved and she thinks most of it is due to ER bed. Tolerating clears, very excited to try low fat diet. Formed stools. No new complaints.      Objective:     Blood pressure 107/64, pulse 89, temperature 98.2  F (36.8  C), temperature source Oral, resp. rate 18, height 1.57 m (5' 1.81\"), weight 82.5 kg (181 lb 12.8 oz), SpO2 98 %.    Body mass index is 33.46 kg/m .  Gen: NAD  Eyes: Bilateral icterus   Cardio: RRR  GI: Non-distended, BS positive, soft, mild epigastric tenderness  Neuro: Alert and orientated x4, no asterixis     Laboratory:  BMP  Recent Labs   Lab 02/15/23  0641 02/14/23  1457 02/14/23  0714 02/13/23  1500   *  --  137 134*   POTASSIUM 3.4* 3.8 2.9* 2.4*   CHLORIDE 102  --  101 92*   JUSTICE 7.8*  --  7.8* 8.6   CO2 23  --  27 29   BUN 2*  --  3* 4*   CR 0.51*  --  0.56* 0.59*   GLC 86  --  95 115     CBC  Recent Labs   Lab 02/15/23  0641 02/14/23  0714 02/13/23  1500   WBC  --  7.8 8.7   RBC  --  3.13* 3.66*   HGB 10.9* 10.5* 12.2   HCT  --  30.6* 36.0   MCV  --  98 98   MCH  --  33.5* 33.3*   MCHC  --  34.3 33.9   RDW  --  25.7* 25.2*   PLT  --  182 225     INR  Recent Labs   Lab 02/14/23  0714 02/13/23  1500   INR 1.19* 1.13      LFTs  Recent Labs   Lab Test 02/15/23  0641 02/14/23  0714 02/13/23  1554 02/13/23  1500 05/17/21  1406   ALBUMIN 2.0* 1.9*  --  2.5* 2.3*   BILITOTAL 7.5* 7.1*  --  8.0* 8.9*   ALT 50* 48*  --  74* 54*   AST 87* 79*  --  99* 145*   PROTEIN  --   --  Negative  --   --    LIPASE 27  --   --  23 26     Imaging:  EXAM: US ABDOMEN LIMITED  LOCATION: [facility]  DATE/TIME: 2/13/2023 7:05 PM     INDICATION: pancreatitis and dilated pancreatic duct  COMPARISON: CT abdomen pelvis 02/13/2023  TECHNIQUE: Limited abdominal ultrasound.     FINDINGS:     GALLBLADDER: Normal. No gallstones, wall thickening, or " pericholecystic fluid. Negative sonographic Das's sign.  BILE DUCTS: No biliary dilatation. The common duct measures mm.  LIVER: Echogenic and mildly enlarged measuring 23 cm long. No focal mass.  RIGHT KIDNEY: No hydronephrosis.  PANCREAS: The visualized portions are normal.  No ascites.                                        IMPRESSION:  1.  Pelvic steatosis and moderate hepatomegaly.     EXAM: CT ABDOMEN PELVIS W CONTRAST  LOCATION: [facility]  DATE/TIME: 2/13/2023 5:46 PM                                             IMPRESSION:  1.  Acute interstitial edematous pancreatitis involving the distal pancreatic body and tail. Main pancreatic duct is mildly dilated and diffusely irregular throughout the inflamed portion of the pancreas.  2.  No peripancreatic fluid collections or evidence of pancreatic parenchymal necrosis.  3.  No calcified gallstones or biliary ductal dilation.  4.  Severe diffuse hepatic steatosis, unchanged.  5.  Mild wall thickening of the ascending colon, improved since 2021, and of uncertain etiology. This could be pseudothickening from underdistention, portal colopathy in the setting of liver disease, or a mild infectious/inflammatory colitis.     Assessment:   1. Acute pancreatitis  42 year old female with history of alcohol abuse, alcoholic pancreatitis, depression who presented with abdominal pain, back pain and LE edema. CT findings c/w acute pancreatitis, likely due to alcohol. Per Van's criteria, this is not severe pancreatitis. She has responded to supportive treatment. We will advance diet, reasonable for discharge from our standpoint if tolerates.       CT scan showed mild dilation and irregularly of pancreatic duct. Discussed with biliary provider, will plan for EUS in 4-6 weeks, patient agreeable.      2. Alcoholic hepatitis  LFTs stable. [place] DF 10, which does not meet criteria for treatment with steroids. Abstinence is mainstay of treatment,  patient declined Rule 25 assessment stating she knows what she needs to do to stay sober and has her father coming into town to support her (who is 20 years sober himself).      Her INR is mildly elevated and platelet count has been dropping, which is concerning for severe fibrosis or cirrhosis. Recommend liver clinic follow-up as outpatient. Of note, patient wishes to follow with [facility] whom she says she's seen before due to Insurance. I will have our office place referrals to make sure she is not lost to follow-up.      Plan:   1. Low fat diet  2. Outpatient follow-up with primary in 1 week to recheck LFTs, INR, BMP  3. EUS in 4-6 weeks, our office will refer to [facility] per pt   4. Liver clinic follow-up within 4 weeks. Our office will refer to [facility] per pt   5. Strict alcohol cessation    Ok from our standpoint for discharge if tolerates diet advancement. We will sign off.     ADDENDUM: Order for GI follow-up and EUS sent to [facility]. Our office left message on pt's voicemail with information. She can call them at [phone].                                                                        Irina Parikh PA-C  Thank you for the opportunity to participate in the care of this patient.   Please feel free to call me with any questions or concerns.  Phone number [phone].

## 2023-02-15 NOTE — DISCHARGE SUMMARY
"[facility] MEDICINE  DISCHARGE SUMMARY     Primary Care Physician: [facility]  Admission Date: 2/13/2023   Discharge Provider: Melissa Guevara Discharge Date: 2/15/2023   Diet:   Active Diet and Nourishment Order   Procedures     Low Fat Diet     Diet       Code Status: Full Code   Activity: DCACTIVITY: Activity as tolerated        Condition at Discharge: Stable     REASON FOR PRESENTATION(See Admission Note for Details)   \"fatigue\"    PRINCIPAL & ACTIVE DISCHARGE DIAGNOSES     Principal Problem:    Pancreatitis  1. Acute Pancreatitis   Suspect d/t alcoholism (pt. Had 1.75 L Vodka q 2 days last month. Did not drink within last week)  Abdominal CT-- acute interstitial edematous pancreatitis involving the distal pancreatic body and tail. Main pancreatic duct is mildly dilated and diffusely irregular throughout the inflamed portion of the pancreas.  GI was consulted, appreciate recommendations  EUS recommended 4-6 weeks out, pt wishes to see [facility].  Referral sent by [facility]  Advance to low-fat diet prior to discharge and tolerating  Pain control prior to discharge previously managed with Toradol     2. Severe fatty liver disease  LFTs were elevated, trending down today  Abdominal US --Echogenic and mildly enlarged measuring 23 cm long. No focal mass.  [facility] recommended Liver clinic F/U outpatient      3. Lower extremity edema, [place]  INR 1.19, trending down, suspect severe fibrosis/cirrhosis  LE Ultrasound negative for DVT     4. Hypokalemia, Hypomagnesemia  K+ 3.4, Mg2+ 1.8 today  Should improve with improved oral intake    5. Alcohol withdrawal  Continues to deny restlessness, N/V, diaphoresis, or hallucinations  CIWA score 3 d/t hand tremors, later CIWA 8 d/t anxiety   Monitor for symptoms (at low risk, last drink 6 days ago)  Encouraged alcohol cessation  Pt continues to deny outside resources, motivated to quit drinking and plans to join her [employer] support " "group.     6. Hypoalbuminemia   2.5 albumin  Improved diet and cessation of alcohol should resolve this issue     7. Obesity  BMI 33.46 kg/m^2   Continue to encourage lifestyle modifications   Pt excited about low fat diet      8. Bereavement   Recently lost , told son today with help of  and   Stop IV Ativan     9. Elevated D-dimers  D-dimer of 1.52 likely elevated due to acute pancreatitis  No CP/SOB on presentation so no indication for CT chest  U/S LE venous doppler negative for DVT  Educate patient about importance of seeking medical attention if she becomes symptomatic.       PENDING LABS     Unresulted Labs Ordered in the Past 30 Days of this Admission     No orders found from 1/14/2023 to 2/14/2023.            PROCEDURES ( this hospitalization only)    N/A    RECOMMENDATIONS TO OUTPATIENT PROVIDER FOR F/U VISIT     Follow-up Appointments     Follow-up and recommended labs and tests       Follow up with primary care provider, [facility],   within 5 days for hospital follow- up.  The following labs/tests are   recommended: bmp, cbc, lft, lipase.         Follow-up and recommended labs and tests       Please assure patient has follow-up with GI and plan for endoscopic   ultrasound at 4 to 6 weeks postdischarge.           DISPOSITION     Home    SUMMARY OF HOSPITAL COURSE:      Tere Hoyt is a 42 year old female with a past medical history of alcoholism, h/o withdrawal seizures, alcoholic hepatitis and depression who was admitted on 2/13/2023 for acute pancreatitis. Hospital course is notable for:     Pt was admitted 2/13/23 with CC of \"fatigue\" for 2 months. She also endorsed symptoms of myalgias, nausea and h/o several episodes of light brown vomiting, SOB w/exertion, leg swelling, lower back pain with bending forward, and abdominal cramping pain, and hematuria. Pt admits h/o heavy alcohol use (Pt had 1.75 L Vodka q 2 days last month. States she did not drink " "week before admit (2/13/23). She denied cp, f/c, or leg pain. Pt grieving loss of  2/13/23.     Her initial eval in the ED was significant for: tachycardia, hypotension, elevated LFTs (alk phos 204, ALT 74, AST 99), Tbili 8.0, normal BNP, normal lipase, and normal CBC.  K+ 2.4 on admit, Mg2+ 1.3. U/A was negative. Abdominal U/S showed fatty liver and normal gallbladder. CT abdomen showed acute interstitial edematous pancreatitis w/main pancreatic duct mildly elevated and diffusely irregular throughout inflamed pancreas. No necrosis, hepatic steatosis, or colon thickening. Initial treatment included: 2L NS, IV Toradol, IV ativan, Mg2+ and K+ replacement, and tele monitoring. [facility] consult ordered. Mg2+ and K+ were improving with supplementation.    2/14/23,  [facility] recommended switching pt diet from NPO to clear liquid diet and advancement as tolerated. [facility] recommended pt f/u with hepatology outpatient and scheduled EUS 4-6 weeks out. Pt also had elevated D-dimer at 1.52. Pt was asymptomatic and LE US D-dimer was negative for DVT. Pt was placed on CIWA protocol scored 3 d/t hand tremors, then was scored CIWA 8 for anxiety. K+ improving at 3.4 and Mg2+ normalized at 1.8.     Pt saw  2/14/23 and CM provided grief resources per request. CM offered active listening. Patient has connections with [employer] and [employer] () that she has used in the past. She attends a recovery [employer] and has AA meetings. She has crisis resources. She is not interested in completing a Rule 25/chemical health assessment or having a psychiatric evaluation while in hospital or prior to discharge. She verbalizes intentions for sobriety- \"I have done it before and I will do it again. I have to do this. There is no question about it. My son needs one parent here\". She denies thoughts to harm herself or others. She is future oriented and hopeful to get discharged tomorrow as she would like to take part in making arrangements " for her 's death with his Mom (who flew in to [place]).     2/15/23  Pt LFTs trending down (Alk Phos 161, AST, 87, ALT 50, Tbili 7.5). Pt is scheduled to follow up with PCP, [facility], within 5 days for hospital follow up. The following labs/tests are recommended: BMP, CBC, LFT, Lipase. Pt is encouraged to eat low-fat diet and abstain from alcohol use. Pt declined outside resources for alcohol cessation again today and is motivated to quit drinking.     Discharge Medications with Med changes:     Current Discharge Medication List      START taking these medications    Details   folic acid 800 MCG tablet Take 1 tablet (800 mcg) by mouth daily for 10 days  Qty: 10 tablet, Refills: 0    Associated Diagnoses: Alcoholism (H)      multivitamin w/minerals (THERA-VIT-M) tablet Take 1 tablet by mouth daily    Associated Diagnoses: Alcoholism (H)      thiamine (B-1) 100 MG tablet Take 1 tablet (100 mg) by mouth daily for 55 days  Qty: 55 tablet, Refills: 0    Associated Diagnoses: Alcoholism (H)         CONTINUE these medications which have NOT CHANGED    Details   levonorgestrel (MIRENA) 20 MCG/DAY IUD 1 each by Intrauterine route      Vitamin D3 (CHOLECALCIFEROL) 125 MCG (5000 UT) tablet Take 125 mcg by mouth daily as needed                   Rationale for medication changes:    Alcohol abuse and withdrawal/vitamin deficiencies/poor nutrition          Consults   GI    GASTROENTEROLOGY IP CONSULT    Immunizations given this encounter     Most Recent Immunizations   Administered Date(s) Administered     COVID-19 Vaccine 12+ (Pfizer 2022) 08/25/2022     COVID-19 Vaccine Bivalent Booster 12+ (Pfizer) 11/25/2022     Influenza Vaccine >6 months (Alfuria,Fluzone) 09/08/2022           Anticoagulation Information      Recent INR results:   Recent Labs   Lab 02/14/23  0714 02/13/23  1500   INR 1.19* 1.13     Warfarin doses (if applicable) or name of other anticoagulant      SIGNIFICANT IMAGING FINDINGS      Results for orders placed or performed during the hospital encounter of 02/13/23   CT Abdomen Pelvis w Contrast    Impression    IMPRESSION:     1.  Acute interstitial edematous pancreatitis involving the distal pancreatic body and tail. Main pancreatic duct is mildly dilated and diffusely irregular throughout the inflamed portion of the pancreas.    2.  No peripancreatic fluid collections or evidence of pancreatic parenchymal necrosis.    3.  No calcified gallstones or biliary ductal dilation.    4.  Severe diffuse hepatic steatosis, unchanged.    5.  Mild wall thickening of the ascending colon, improved since 2021, and of uncertain etiology. This could be pseudothickening from underdistention, portal colopathy in the setting of liver disease, or a mild infectious/inflammatory colitis.   Abdomen US, limited (RUQ only)    Impression    IMPRESSION:  1.  Pelvic steatosis and moderate hepatomegaly.       US Lower Extremity Venous Duplex Bilateral    Impression    IMPRESSION:  1.  No deep venous thrombosis in the bilateral lower extremities.       SIGNIFICANT LABORATORY FINDINGS     Most Recent 3 CBC's:Recent Labs   Lab Test 02/15/23  0641 02/14/23 0714 02/13/23  1500 05/17/21  1406   WBC  --  7.8 8.7 8.7   HGB 10.9* 10.5* 12.2 13.0   MCV  --  98 98 106*   PLT  --  182 225 285     Most Recent 3 BMP's:Recent Labs   Lab Test 02/15/23  1248 02/15/23  0641 02/14/23  1457 02/14/23  0714 02/13/23  1500   NA  --  134*  --  137 134*   POTASSIUM 3.6 3.4* 3.8 2.9* 2.4*   CHLORIDE  --  102  --  101 92*   CO2  --  23  --  27 29   BUN  --  2*  --  3* 4*   CR  --  0.51*  --  0.56* 0.59*   ANIONGAP  --  9  --  9 13   JUSTICE  --  7.8*  --  7.8* 8.6   GLC  --  86  --  95 115     Most Recent 2 LFT's:Recent Labs   Lab Test 02/15/23  0641 02/14/23  0714   AST 87* 79*   ALT 50* 48*   ALKPHOS 161* 156*   BILITOTAL 7.5* 7.1*     Most Recent 3 INR's:Recent Labs   Lab Test 02/14/23  0714 02/13/23  1500 05/17/21  1638   INR 1.19* 1.13 1.09      Most Recent D-dimer:Recent Labs   Lab Test 02/13/23  1500   DD 1.52*       Discharge Orders        Reason for your hospital stay    Pancreatitis from alcohol.     Follow-up and recommended labs and tests     Follow up with primary care provider, [facility], within 5 days for hospital follow- up.  The following labs/tests are recommended: bmp, cbc, lft, lipase.     Activity    Your activity upon discharge: activity as tolerated     Follow-up and recommended labs and tests     Please assure patient has follow-up with GI and plan for endoscopic ultrasound at 4 to 6 weeks postdischarge.     Diet    Follow this diet upon discharge: Orders Placed This Encounter      Low Fat Diet       Examination   Physical Exam   Temp:  [97.9  F (36.6  C)-98.5  F (36.9  C)] 98.2  F (36.8  C)  Pulse:  [] 89  Resp:  [18-33] 18  BP: (100-111)/(59-78) 107/64  SpO2:  [93 %-98 %] 98 %  Wt Readings from Last 1 Encounters:   02/15/23 82.5 kg (181 lb 12.8 oz)       Constitutional: awake, alert, cooperative, no apparent distress, and appears stated age  Eyes: Lids and lashes normal, pupils equal, round and reactive to light, extra ocular muscles intact, sclera clear, conjunctiva normal and icteric bilaterally  Respiratory: No increased work of breathing, good air exchange, clear to auscultation bilaterally, no crackles or wheezing  Cardiovascular: Normal apical impulse, regular rate and rhythm, normal S1 and S2, no S3 or S4, and no murmur noted  GI: No scars, normal bowel sounds, soft, non-distended, non-tender, no masses palpated, no hepatosplenomegally  Skin: no bruising or bleeding, normal skin color, texture, turgor, no redness, warmth, or swelling, no rashes, no lesions, no abnormal moles, nails normal without discoloration or clubbing and no jaundice  Musculoskeletal: there is no redness, warmth, or swelling of the joints  full range of motion noted  with exception of   Bilateral lower extremity pitting edema  2-3+  right hand grasp 4/5  Left hand grasp 4/5   Neurologic: Awake, alert, oriented to name, place and time.  Cranial nerves II-XII are grossly intact.  Motor is 5 out of 5 bilaterally.  Cerebellar finger to nose, heel to shin intact.  Sensory is intact.  Babinski down going, Romberg negative, and gait is normal.  Neuropsychiatric: General: normal, calm and normal eye contact  Level of consciousness: alert / normal  Affect: sad  Orientation: oriented to self, place, time and situation  Memory and insight: normal, memory for past and recent events intact and thought process normal      Please see EMR for more detailed significant labs, imaging, consultant notes etc.    I, Melissa Guevara, personally saw the patient today and spent greater than 30 minutes discharging this patient.    Melissa NETTLES [facility]    CC:Clinic, [facility]    2/15/2023   WHS :Faculty Attestation   I have seen and examined the patient.   I have discussed the case with PA student Melissa Guevara.  I agree with the findings, assessment and plan.   Roni Lezama MD

## 2023-02-15 NOTE — PLAN OF CARE
Problem: Plan of Care - These are the overarching goals to be used throughout the patient stay.    Goal: Optimal Comfort and Wellbeing  Outcome: Progressing     Problem: Alcohol Withdrawal  Goal: Alcohol Withdrawal Symptom Control  Outcome: Progressing   Goal Outcome Evaluation:    Patient A&Ox4, VSS, and afebrile. Pt scored 0 for each CIWA today. Advanced diet and tolerated well, passing gas, had small BM. IV is saline locked. Pt will discharge home today with family to transport.

## 2023-02-15 NOTE — PLAN OF CARE
Problem: Plan of Care - These are the overarching goals to be used throughout the patient stay.    Goal: Readiness for Transition of Care  Outcome: Progressing     Problem: Alcohol Withdrawal  Goal: Readiness for Change Identified  Outcome: Progressing     A&Ox4. Pt emotional and anxious due to loss of her . CIWA score 8 due to anxiety, after 1mg ativan, pt sleeping comfortably. CIWA scores 8, 0 and 0.Tele NSR. LR running at 100mL/hr. Edema in bilateral LE +2/3. K and Mg protocol. Clear liquid diet. Alarms on for safety.

## 2023-02-15 NOTE — PROGRESS NOTES
Care Management Initial Consult    General Information  Assessment completed with: Patient,    Type of CM/SW Visit: Chart Assessment    Primary Care Provider verified and updated as needed:     Readmission within the last 30 days: no previous admission in last 30 days         Advance Care Planning: Advance Care Planning Reviewed:  (no HCD)        Communication Assessment  Patient's communication style: spoken language (English or Bilingual)        Cognitive  Cognitive/Neuro/Behavioral: WDL                      Living Environment:   People in home: child(nico), dependent (9 year old son)     Current living Arrangements: house      Able to return to prior arrangements: yes     Family/Social Support:  Care provided by: self  Provides care for: child(nico) (10 yo son)  Marital Status:  (was , seperated,  just passed away)  Parent(s)          Description of Support System: Supportive, Involved       Current Resources:   Patient receiving home care services: No  Community Resources: None  Equipment currently used at home:    Supplies currently used at home:      Employment/Financial:  Employment Status: currently on FMLA from her job           Financial Concerns:  None that need to be dealt with at this time.       Lifestyle & Psychosocial Needs:  Social Determinants of Health     Tobacco Use: Medium Risk     Smoking Tobacco Use: Former     Smokeless Tobacco Use: Never     Passive Exposure: Not on file   Alcohol Use: Not on file   Financial Resource Strain: Not on file   Food Insecurity: Not on file   Transportation Needs: Not on file   Physical Activity: Not on file   Stress: Not on file   Social Connections: Not on file   Intimate Partner Violence: Not on file   Depression: Not on file   Housing Stability: Not on file       Functional Status:  Prior to admission patient needed assistance:   Dependent ADLs:: Independent  Dependent IADLs:: Independent       Mental Health Status:  Mental Health Status:  (hx of  "anxiety)  Mental Health Management: Psychiatrist, Medication    Chemical Dependency Status:  Chemical Dependency Status:  (hx of alcohol abuse, last IP CD treatment was at [facility] 2022- did IOP and OP after. Has been drinking for past 2 months.)             Values/Beliefs:  Spiritual, Cultural Beliefs, Restoration Practices, Values that affect care: no               Additional Information:  Chart reviewed.     CM met with patient; assessment completed. Patient lives in private home with her 9 year old son. Her  moved out last May and she had went to treatment at [facility] in August (IP, then IOP then OP). She shared that her  still came to the house and \"helped with things\". That he was just at her place over the weekend but she was unable to reach him or get into his place and called police for a welfare check to be done (he was found ). Today she shared the information with her 9 year old son.     CM provided grief resources per request. CM offered active listening. Patient has connections with [facility] and [employer] () that she has used in the past. She attends a recovery [employer] and has AA meetings. She has crisis resources.     Her dad will be returning to [place] tomorrow and plans to stay with her as long as needed. She shared that her dad is great support for her when she is sober as he has almost 20 years of sobriety himself.    She is not interested in completing a Rule 25/chemical health assessment or having a psychiatric evaluation while in hospital or prior to discharge. She verbalizes intentions for sobriety- \"I have done it before and I will do it again. I have to do this. There is no question about it. My son needs one parent here\".   She denies thoughts to harm herself or others. She is future oriented and hopeful to get discharged tomorrow as she would like to take part in making arrangements for her 's death with his Mom (who flew in to [place]).     Her son is in the " care of family at this time and his school is aware of what is going on.     She will call family or a friend to transport at hospital discharge.     Estefani Humphrey RN

## 2023-02-15 NOTE — PLAN OF CARE
Problem: Alcohol Withdrawal  Goal: Alcohol Withdrawal Symptom Control  Outcome: Progressing   Goal Outcome Evaluation:             Alert and oriented x4. Pleasant and cooperative. Ciwa protocol, scored 3 this evening. Telemetry monitoring, sinus tachycardia.

## 2023-02-15 NOTE — PROGRESS NOTES
"Care Management Discharge Note    Discharge Date: 02/15/2023     Discharge Disposition: Home    Discharge Services: None    Discharge Transportation: family or friend will provide    Education Provided on the Discharge Plan:  AVS per bedside RN    Persons Notified of Discharge Plans: patient    Patient/Family in Agreement with the Plan: yes        Additional Information:  Chart reviewed. Anticipate discharge home later today. Family will provide transportation at time of hospital discharge.     Per CHEPE DOLL note: CM provided grief resources per request. CM offered active listening. Patient has connections with [employer] () that she has used in the past. She attends a recovery [employer] and has AA meetings. She has crisis resources.      Her dad will be returning to [place] tomorrow and plans to stay with her as long as needed. She shared that her dad is great support for her when she is sober as he has almost 20 years of sobriety himself.     She is not interested in completing a Rule 25/chemical health assessment or having a psychiatric evaluation while in hospital or prior to discharge. She verbalizes intentions for sobriety- \"I have done it before and I will do it again. I have to do this. There is no question about it. My son needs one parent here\".   She denies thoughts to harm herself or others. She is future oriented and hopeful to get discharged tomorrow as she would like to take part in making arrangements for her 's death with his Mom (who flew in to [place]).       Macey Rodriguez RN        "

## 2023-02-15 NOTE — PROGRESS NOTES
SPIRITUAL HEALTH SERVICES Progress Note      Saw pt Tere V Lai per follow up     Patient/Family Understanding of Illness and Goals of Care - Tere is hopeful she will discharge today, she named feeling better just hungry sore    Distress and Loss - Tere continues to process her complicated grief surrounding her husbands sudden death, supporting her 9 yr old son and navigating the administrative complexities following a death.     Strengths, Coping, and Resources - Tere engaged in reflective conversation and emotional reflective.      Meaning, Beliefs, and Spirituality -Not explored this visit    Plan of Care - cont to assess and support daily       Drew Fowler M.Div., [facility]  Staff    [phone]

## 2023-03-13 ENCOUNTER — HOSPITAL ENCOUNTER (INPATIENT)
Facility: CLINIC | Age: 43
LOS: 2 days | Discharge: HOME | DRG: 439 | End: 2023-03-15
Attending: EMERGENCY MEDICINE | Admitting: STUDENT IN AN ORGANIZED HEALTH CARE EDUCATION/TRAINING PROGRAM
Payer: COMMERCIAL

## 2023-03-13 ENCOUNTER — APPOINTMENT (OUTPATIENT)
Dept: ULTRASOUND IMAGING | Facility: CLINIC | Age: 43
DRG: 439 | End: 2023-03-13
Attending: STUDENT IN AN ORGANIZED HEALTH CARE EDUCATION/TRAINING PROGRAM
Payer: COMMERCIAL

## 2023-03-13 ENCOUNTER — APPOINTMENT (OUTPATIENT)
Dept: RADIOLOGY | Facility: CLINIC | Age: 43
DRG: 439 | End: 2023-03-13
Attending: EMERGENCY MEDICINE
Payer: COMMERCIAL

## 2023-03-13 ENCOUNTER — APPOINTMENT (OUTPATIENT)
Dept: CT IMAGING | Facility: CLINIC | Age: 43
DRG: 439 | End: 2023-03-13
Attending: EMERGENCY MEDICINE
Payer: COMMERCIAL

## 2023-03-13 DIAGNOSIS — K85.90 ACUTE PANCREATITIS, UNSPECIFIED COMPLICATION STATUS, UNSPECIFIED PANCREATITIS TYPE: ICD-10-CM

## 2023-03-13 PROBLEM — F10.20 ALCOHOLISM (H): Status: ACTIVE | Noted: 2021-01-26

## 2023-03-13 PROBLEM — F33.2 SEVERE EPISODE OF RECURRENT MAJOR DEPRESSIVE DISORDER, WITHOUT PSYCHOTIC FEATURES (H): Status: ACTIVE | Noted: 2020-12-04

## 2023-03-13 LAB
ALBUMIN SERPL-MCNC: 2.7 G/DL (ref 3.5–5)
ALBUMIN UR-MCNC: NEGATIVE MG/DL
ALP SERPL-CCNC: 93 U/L (ref 45–120)
ALT SERPL W P-5'-P-CCNC: 40 U/L (ref 0–45)
ANION GAP SERPL CALCULATED.3IONS-SCNC: 6 MMOL/L (ref 5–18)
APPEARANCE UR: CLEAR
AST SERPL W P-5'-P-CCNC: 102 U/L (ref 0–40)
BACTERIA #/AREA URNS HPF: ABNORMAL /HPF
BASOPHILS # BLD AUTO: 0.1 10E3/UL (ref 0–0.2)
BASOPHILS NFR BLD AUTO: 1 %
BILIRUB DIRECT SERPL-MCNC: 0.8 MG/DL
BILIRUB SERPL-MCNC: 1.8 MG/DL (ref 0–1)
BILIRUB UR QL STRIP: NEGATIVE
BUN SERPL-MCNC: 5 MG/DL (ref 8–22)
CALCIUM SERPL-MCNC: 8.5 MG/DL (ref 8.5–10.5)
CHLORIDE BLD-SCNC: 104 MMOL/L (ref 98–107)
CO2 SERPL-SCNC: 25 MMOL/L (ref 22–31)
COLOR UR AUTO: ABNORMAL
CREAT SERPL-MCNC: 0.53 MG/DL (ref 0.6–1.1)
CREAT SERPL-MCNC: 0.56 MG/DL (ref 0.6–1.1)
EOSINOPHIL # BLD AUTO: 0.2 10E3/UL (ref 0–0.7)
EOSINOPHIL NFR BLD AUTO: 2 %
ERYTHROCYTE [DISTWIDTH] IN BLOOD BY AUTOMATED COUNT: 13.4 % (ref 10–15)
ETHANOL SERPL-MCNC: <10 MG/DL
GFR SERPL CREATININE-BSD FRML MDRD: >90 ML/MIN/1.73M2
GFR SERPL CREATININE-BSD FRML MDRD: >90 ML/MIN/1.73M2
GLUCOSE BLD-MCNC: 103 MG/DL (ref 70–125)
GLUCOSE UR STRIP-MCNC: NEGATIVE MG/DL
HCT VFR BLD AUTO: 38.4 % (ref 35–47)
HGB BLD-MCNC: 12.2 G/DL (ref 11.7–15.7)
HGB UR QL STRIP: NEGATIVE
IMM GRANULOCYTES # BLD: 0 10E3/UL
IMM GRANULOCYTES NFR BLD: 0 %
KETONES UR STRIP-MCNC: NEGATIVE MG/DL
LEUKOCYTE ESTERASE UR QL STRIP: NEGATIVE
LIPASE SERPL-CCNC: 157 U/L (ref 0–52)
LYMPHOCYTES # BLD AUTO: 2 10E3/UL (ref 0.8–5.3)
LYMPHOCYTES NFR BLD AUTO: 24 %
MCH RBC QN AUTO: 33.7 PG (ref 26.5–33)
MCHC RBC AUTO-ENTMCNC: 31.8 G/DL (ref 31.5–36.5)
MCV RBC AUTO: 106 FL (ref 78–100)
MONOCYTES # BLD AUTO: 0.7 10E3/UL (ref 0–1.3)
MONOCYTES NFR BLD AUTO: 9 %
MUCOUS THREADS #/AREA URNS LPF: PRESENT /LPF
NEUTROPHILS # BLD AUTO: 5.4 10E3/UL (ref 1.6–8.3)
NEUTROPHILS NFR BLD AUTO: 64 %
NITRATE UR QL: NEGATIVE
NRBC # BLD AUTO: 0 10E3/UL
NRBC BLD AUTO-RTO: 0 /100
PH UR STRIP: 7.5 [PH] (ref 5–7)
PHOSPHATE SERPL-MCNC: 3.4 MG/DL (ref 2.5–4.5)
PLATELET # BLD AUTO: 306 10E3/UL (ref 150–450)
POTASSIUM BLD-SCNC: 4.1 MMOL/L (ref 3.5–5)
PROT SERPL-MCNC: 8 G/DL (ref 6–8)
RBC # BLD AUTO: 3.62 10E6/UL (ref 3.8–5.2)
RBC URINE: 1 /HPF
SODIUM SERPL-SCNC: 135 MMOL/L (ref 136–145)
SP GR UR STRIP: 1.01 (ref 1–1.03)
SQUAMOUS EPITHELIAL: 3 /HPF
TROPONIN I SERPL-MCNC: <0.01 NG/ML (ref 0–0.29)
UROBILINOGEN UR STRIP-MCNC: <2 MG/DL
WBC # BLD AUTO: 8.4 10E3/UL (ref 4–11)
WBC URINE: 3 /HPF

## 2023-03-13 PROCEDURE — 85025 COMPLETE CBC W/AUTO DIFF WBC: CPT | Performed by: EMERGENCY MEDICINE

## 2023-03-13 PROCEDURE — 250N000011 HC RX IP 250 OP 636: Performed by: EMERGENCY MEDICINE

## 2023-03-13 PROCEDURE — 258N000003 HC RX IP 258 OP 636: Performed by: INTERNAL MEDICINE

## 2023-03-13 PROCEDURE — 99223 1ST HOSP IP/OBS HIGH 75: CPT | Mod: AI | Performed by: STUDENT IN AN ORGANIZED HEALTH CARE EDUCATION/TRAINING PROGRAM

## 2023-03-13 PROCEDURE — 84100 ASSAY OF PHOSPHORUS: CPT | Performed by: INTERNAL MEDICINE

## 2023-03-13 PROCEDURE — 96361 HYDRATE IV INFUSION ADD-ON: CPT

## 2023-03-13 PROCEDURE — 74177 CT ABD & PELVIS W/CONTRAST: CPT

## 2023-03-13 PROCEDURE — 99285 EMERGENCY DEPT VISIT HI MDM: CPT | Mod: 25

## 2023-03-13 PROCEDURE — 82565 ASSAY OF CREATININE: CPT | Performed by: STUDENT IN AN ORGANIZED HEALTH CARE EDUCATION/TRAINING PROGRAM

## 2023-03-13 PROCEDURE — 81001 URINALYSIS AUTO W/SCOPE: CPT | Performed by: EMERGENCY MEDICINE

## 2023-03-13 PROCEDURE — 258N000003 HC RX IP 258 OP 636: Performed by: STUDENT IN AN ORGANIZED HEALTH CARE EDUCATION/TRAINING PROGRAM

## 2023-03-13 PROCEDURE — 83690 ASSAY OF LIPASE: CPT | Performed by: EMERGENCY MEDICINE

## 2023-03-13 PROCEDURE — 82310 ASSAY OF CALCIUM: CPT | Performed by: EMERGENCY MEDICINE

## 2023-03-13 PROCEDURE — 82248 BILIRUBIN DIRECT: CPT | Performed by: EMERGENCY MEDICINE

## 2023-03-13 PROCEDURE — 93005 ELECTROCARDIOGRAM TRACING: CPT | Performed by: EMERGENCY MEDICINE

## 2023-03-13 PROCEDURE — 36415 COLL VENOUS BLD VENIPUNCTURE: CPT | Performed by: EMERGENCY MEDICINE

## 2023-03-13 PROCEDURE — 80053 COMPREHEN METABOLIC PANEL: CPT | Performed by: EMERGENCY MEDICINE

## 2023-03-13 PROCEDURE — 250N000011 HC RX IP 250 OP 636: Performed by: STUDENT IN AN ORGANIZED HEALTH CARE EDUCATION/TRAINING PROGRAM

## 2023-03-13 PROCEDURE — 250N000013 HC RX MED GY IP 250 OP 250 PS 637: Performed by: STUDENT IN AN ORGANIZED HEALTH CARE EDUCATION/TRAINING PROGRAM

## 2023-03-13 PROCEDURE — C9803 HOPD COVID-19 SPEC COLLECT: HCPCS

## 2023-03-13 PROCEDURE — 71046 X-RAY EXAM CHEST 2 VIEWS: CPT

## 2023-03-13 PROCEDURE — 84484 ASSAY OF TROPONIN QUANT: CPT | Performed by: EMERGENCY MEDICINE

## 2023-03-13 PROCEDURE — 82077 ASSAY SPEC XCP UR&BREATH IA: CPT | Performed by: EMERGENCY MEDICINE

## 2023-03-13 PROCEDURE — 96374 THER/PROPH/DIAG INJ IV PUSH: CPT | Mod: 59

## 2023-03-13 PROCEDURE — 93970 EXTREMITY STUDY: CPT

## 2023-03-13 PROCEDURE — 120N000001 HC R&B MED SURG/OB

## 2023-03-13 PROCEDURE — 258N000003 HC RX IP 258 OP 636: Performed by: EMERGENCY MEDICINE

## 2023-03-13 RX ORDER — HYDROXYZINE HYDROCHLORIDE 25 MG/1
50 TABLET, FILM COATED ORAL EVERY 6 HOURS PRN
Status: DISCONTINUED | OUTPATIENT
Start: 2023-03-13 | End: 2023-03-15 | Stop reason: HOSPADM

## 2023-03-13 RX ORDER — ENOXAPARIN SODIUM 100 MG/ML
40 INJECTION SUBCUTANEOUS EVERY 24 HOURS
Status: DISCONTINUED | OUTPATIENT
Start: 2023-03-13 | End: 2023-03-15 | Stop reason: HOSPADM

## 2023-03-13 RX ORDER — ESCITALOPRAM OXALATE 5 MG/1
5 TABLET ORAL AT BEDTIME
Status: DISCONTINUED | OUTPATIENT
Start: 2023-03-13 | End: 2023-03-15 | Stop reason: HOSPADM

## 2023-03-13 RX ORDER — ONDANSETRON 4 MG/1
4 TABLET, ORALLY DISINTEGRATING ORAL EVERY 6 HOURS PRN
Status: DISCONTINUED | OUTPATIENT
Start: 2023-03-13 | End: 2023-03-15 | Stop reason: HOSPADM

## 2023-03-13 RX ORDER — PROCHLORPERAZINE MALEATE 10 MG
10 TABLET ORAL EVERY 6 HOURS PRN
Status: DISCONTINUED | OUTPATIENT
Start: 2023-03-13 | End: 2023-03-15 | Stop reason: HOSPADM

## 2023-03-13 RX ORDER — SODIUM CHLORIDE 9 MG/ML
INJECTION, SOLUTION INTRAVENOUS CONTINUOUS
Status: DISCONTINUED | OUTPATIENT
Start: 2023-03-13 | End: 2023-03-14

## 2023-03-13 RX ORDER — PROCHLORPERAZINE 25 MG
25 SUPPOSITORY, RECTAL RECTAL EVERY 12 HOURS PRN
Status: DISCONTINUED | OUTPATIENT
Start: 2023-03-13 | End: 2023-03-15 | Stop reason: HOSPADM

## 2023-03-13 RX ORDER — IOPAMIDOL 755 MG/ML
100 INJECTION, SOLUTION INTRAVASCULAR ONCE
Status: COMPLETED | OUTPATIENT
Start: 2023-03-13 | End: 2023-03-13

## 2023-03-13 RX ORDER — FAMOTIDINE 20 MG/1
20 TABLET, FILM COATED ORAL 2 TIMES DAILY
Status: DISCONTINUED | OUTPATIENT
Start: 2023-03-13 | End: 2023-03-15 | Stop reason: HOSPADM

## 2023-03-13 RX ORDER — SODIUM CHLORIDE, SODIUM LACTATE, POTASSIUM CHLORIDE, CALCIUM CHLORIDE 600; 310; 30; 20 MG/100ML; MG/100ML; MG/100ML; MG/100ML
INJECTION, SOLUTION INTRAVENOUS CONTINUOUS
Status: DISCONTINUED | OUTPATIENT
Start: 2023-03-13 | End: 2023-03-13

## 2023-03-13 RX ORDER — ESCITALOPRAM OXALATE 5 MG/1
5 TABLET ORAL AT BEDTIME
COMMUNITY
Start: 2022-09-03 | End: 2024-04-07

## 2023-03-13 RX ORDER — LIDOCAINE 40 MG/G
CREAM TOPICAL
Status: DISCONTINUED | OUTPATIENT
Start: 2023-03-13 | End: 2023-03-15 | Stop reason: HOSPADM

## 2023-03-13 RX ORDER — ONDANSETRON 2 MG/ML
4 INJECTION INTRAMUSCULAR; INTRAVENOUS EVERY 6 HOURS PRN
Status: DISCONTINUED | OUTPATIENT
Start: 2023-03-13 | End: 2023-03-15 | Stop reason: HOSPADM

## 2023-03-13 RX ORDER — FAMOTIDINE 20 MG/1
TABLET, FILM COATED ORAL
COMMUNITY
Start: 2023-02-20 | End: 2024-04-07

## 2023-03-13 RX ORDER — HYDROXYZINE HYDROCHLORIDE 25 MG/1
25 TABLET, FILM COATED ORAL EVERY 6 HOURS PRN
Status: DISCONTINUED | OUTPATIENT
Start: 2023-03-13 | End: 2023-03-15 | Stop reason: HOSPADM

## 2023-03-13 RX ADMIN — SODIUM CHLORIDE, POTASSIUM CHLORIDE, SODIUM LACTATE AND CALCIUM CHLORIDE: 600; 310; 30; 20 INJECTION, SOLUTION INTRAVENOUS at 21:07

## 2023-03-13 RX ADMIN — SODIUM CHLORIDE: 9 INJECTION, SOLUTION INTRAVENOUS at 22:50

## 2023-03-13 RX ADMIN — ESCITALOPRAM OXALATE 5 MG: 5 TABLET, FILM COATED ORAL at 22:49

## 2023-03-13 RX ADMIN — HYDROMORPHONE HYDROCHLORIDE 1 MG: 1 INJECTION, SOLUTION INTRAMUSCULAR; INTRAVENOUS; SUBCUTANEOUS at 16:49

## 2023-03-13 RX ADMIN — SODIUM CHLORIDE 1000 ML: 9 INJECTION, SOLUTION INTRAVENOUS at 16:44

## 2023-03-13 RX ADMIN — IOPAMIDOL 100 ML: 755 INJECTION, SOLUTION INTRAVENOUS at 17:10

## 2023-03-13 RX ADMIN — FAMOTIDINE 20 MG: 20 TABLET ORAL at 21:31

## 2023-03-13 RX ADMIN — Medication 1 MG: at 22:56

## 2023-03-13 RX ADMIN — ENOXAPARIN SODIUM 40 MG: 100 INJECTION SUBCUTANEOUS at 21:31

## 2023-03-13 ASSESSMENT — ACTIVITIES OF DAILY LIVING (ADL)
ADLS_ACUITY_SCORE: 35

## 2023-03-13 NOTE — ED PROVIDER NOTES
EMERGENCY DEPARTMENT ENCOUNTER            IMPRESSION:  Pancreatitis  Splenic thrombosis      MEDICAL DECISION MAKING:  It was my pleasure to provide care for Tere Hoyt who presented for evaluation of epigastric abdominal pain.  She has a history of pancreatitis and alcohol chemical dependency    Patient is pleasant and cooperative she appears uncomfortable    On exam vital signs show tachycardia.  She appears uncomfortable.  She has epigastric abdominal pain.      Symptomatic medications were provided including fluids and pain medication    EKG notable for sinus tachycardia    Significant laboratory findings include elevated lipase, otherwise CBC and chemistry are unremarkable    Imaging independently reviewed and agree with radiologist findings of pancreatitis on CT of the abdomen    Results were discussed with patient.  She feels much better at this time.  She will be admitted to hospitalist          =================================================================  CHIEF COMPLAINT:  Chief Complaint   Patient presents with     Flank Pain     Shortness of Breath         HPI  Tere Hoyt is a 42 year old female with a history of alcohol withdrawal seizure with complication, alcoholic ketoacidosis, alcoholism, and pancreatitis who presents to the ED by private car for evaluation of flank pain.    Patient complains of a left sided flank pain that began on 3/10. Patient reports that her pain is worse with deep inspiration and is causing her to be short of breath. She says that she has been unable to get much sleep due to her pain. Patient notes that she started taking famotidine on 2/20. Patient denies vomiting or any other concerns at this time.    Patient denies any recent alcohol use and reports that she is 31 days sober from alcohol.     I, Jh Beth am serving as a scribe to document services personally performed by Dr. Rich Culp MD, based on my observation and the provider's statements to me.  "I, Dr. Rich Culp MD attest that Jh Beth is acting in a scribe capacity, has observed my performance of the services and has documented them in accordance with my direction.    REVIEW OF SYSTEMS   Constitutional: Does not report chills, unintentional weight loss or fatigue   Eyes: Does not report visual changes or discharge    HENT: Does not report sore throat, ear pain or neck pain  Respiratory: Endorses shortness of breath secondary to pain. Does not report cough.  Cardiovascular: Does not report chest pain, palpitations or leg swelling  GI: Does not report abdominal pain, nausea, vomiting, or dark, bloody stools.    : Endorses left-sided flank pain. Does not report hematuria or dysuria.  Musculoskeletal: Does not report any new musculoskeletal pain or new muscle/joint pains  Skin: Does not report rash or wound  Neurologic: Does not report current headache, new weakness, focal weakness, or sensory changes        Remainder of systems reviewed, unless noted in HPI all others negative.      PAST MEDICAL HISTORY:  History reviewed. No pertinent past medical history.    PAST SURGICAL HISTORY:  No past surgical history on file.      CURRENT MEDICATIONS:    escitalopram (LEXAPRO) 5 MG tablet  famotidine (PEPCID) 20 MG tablet  levonorgestrel (MIRENA) 20 MCG/DAY IUD  melatonin 5 MG tablet  multivitamin w/minerals (THERA-VIT-M) tablet  thiamine (B-1) 100 MG tablet  Vitamin D3 (CHOLECALCIFEROL) 125 MCG (5000 UT) tablet        ALLERGIES:  No Known Allergies    FAMILY HISTORY:  No family history on file.    SOCIAL HISTORY:   Social History     Socioeconomic History     Marital status:    Tobacco Use     Smoking status: Former     Types: Cigarettes     Smokeless tobacco: Never       PHYSICAL EXAM:    /60 (BP Location: Right arm, Patient Position: Semi-Baig's, Cuff Size: Adult Small)   Pulse 82   Temp 98.7  F (37.1  C) (Oral)   Resp 19   Ht 1.575 m (5' 2\")   Wt 81.6 kg (180 lb)   SpO2 97%   BMI " 32.92 kg/m      Constitutional: Uncomfortable  Head: Normocephalic, atraumatic.  ENT: Mucous membranes moist. Posterior oropharynx appears normal.  Eyes: Pupils midrange and reactive ,no conjunctival discharge  Neck: No lymphadenopathy, no stridor, supple, no soft tissue swelling  Chest: No tenderness   Respiratory: Respirations even, unlabored. Lungs clear to ascultation bilaterally, in no acute respiratory distress.  Cardiovascular: Regular rate and rhythm.+2 radial pulses, equal bilaterally.  No murmurs.   GI: Epigastric tender to palpation  Back: No CVA tenderness.    Musculoskeletal: Moves all 4 extremities equally, strength symmetrical on bilateral uppers and lowers.  No peripheral edema  Integument: Warm, dry. No rash. No bruising or petechiae.  Lymphatic: No cervical lymphadenopathy  Neurologic: Alert & oriented x 3. Normal speech. Grossly normal motor and sensory function. No focal deficits noted.  NIHSS = 0  Psychiatric: Normal mood and affect. Normal judgement.    ED COURSE:    4:15 PM I performed my initial interview and exam.  6:36 PM I spoke with Dr. Molina, Hospitalist.      Medical Decision Making    History:    Supplemental history from: N/A    External Record(s) reviewed: External medical records including care everywhere reviewed    Work Up:    EKG laboratory and imaging studies independently reviewed by the provider    Broad differential diagnosis considered for abdominal pain including pancreatitis involved      External consultation:    Discussion of management with another provider: Hospitalist     Complicating factors:    Patient has a complicated past medical history including alcohol abuse and pancreatitis    Care affected by social determinants of health: Access to primary care    Disposition considerations: Admit to the hospital             LAB:  Laboratory results were independently reviewed and interpreted  Results for orders placed or performed during the hospital encounter of 03/13/23    XR Chest 2 Views    Impression    IMPRESSION: Mild bibasilar atelectasis. No acute cardiopulmonary disease.   CT Abdomen Pelvis w Contrast    Impression    IMPRESSION:   1.  Moderate acute interstitial edematous pancreatitis.  2.  Multiple newly visualized low-density lesions within the pancreatic body and tail. Differential considerations include pseudocysts and sequelae of pancreatic necrosis.  3.  New minimal nonocclusive thrombus in the splenic vein.  4.  Hepatic steatosis.   US Lower Extremity Venous Duplex Bilateral    Impression    IMPRESSION:    No deep venous thrombosis in the visualized bilateral lower extremities.   Basic metabolic panel   Result Value Ref Range    Sodium 135 (L) 136 - 145 mmol/L    Potassium 4.1 3.5 - 5.0 mmol/L    Chloride 104 98 - 107 mmol/L    Carbon Dioxide (CO2) 25 22 - 31 mmol/L    Anion Gap 6 5 - 18 mmol/L    Urea Nitrogen 5 (L) 8 - 22 mg/dL    Creatinine 0.56 (L) 0.60 - 1.10 mg/dL    Calcium 8.5 8.5 - 10.5 mg/dL    Glucose 103 70 - 125 mg/dL    GFR Estimate >90 >60 mL/min/1.73m2   Result Value Ref Range    Troponin I <0.01 0.00 - 0.29 ng/mL   UA with Microscopic reflex to Culture    Specimen: Urine, Midstream   Result Value Ref Range    Color Urine Light Yellow Colorless, Straw, Light Yellow, Yellow    Appearance Urine Clear Clear    Glucose Urine Negative Negative mg/dL    Bilirubin Urine Negative Negative    Ketones Urine Negative Negative mg/dL    Specific Gravity Urine 1.008 1.001 - 1.030    Blood Urine Negative Negative    pH Urine 7.5 (H) 5.0 - 7.0    Protein Albumin Urine Negative Negative mg/dL    Urobilinogen Urine <2.0 <2.0 mg/dL    Nitrite Urine Negative Negative    Leukocyte Esterase Urine Negative Negative    Bacteria Urine Many (A) None Seen /HPF    Mucus Urine Present (A) None Seen /LPF    RBC Urine 1 <=2 /HPF    WBC Urine 3 <=5 /HPF    Squamous Epithelials Urine 3 (H) <=1 /HPF   Result Value Ref Range    Lipase 157 (H) 0 - 52 U/L   Hepatic function panel    Result Value Ref Range    Bilirubin Total 1.8 (H) 0.0 - 1.0 mg/dL    Bilirubin Direct 0.8 (H) <=0.5 mg/dL    Protein Total 8.0 6.0 - 8.0 g/dL    Albumin 2.7 (L) 3.5 - 5.0 g/dL    Alkaline Phosphatase 93 45 - 120 U/L     (H) 0 - 40 U/L    ALT 40 0 - 45 U/L   CBC with platelets and differential   Result Value Ref Range    WBC Count 8.4 4.0 - 11.0 10e3/uL    RBC Count 3.62 (L) 3.80 - 5.20 10e6/uL    Hemoglobin 12.2 11.7 - 15.7 g/dL    Hematocrit 38.4 35.0 - 47.0 %     (H) 78 - 100 fL    MCH 33.7 (H) 26.5 - 33.0 pg    MCHC 31.8 31.5 - 36.5 g/dL    RDW 13.4 10.0 - 15.0 %    Platelet Count 306 150 - 450 10e3/uL    % Neutrophils 64 %    % Lymphocytes 24 %    % Monocytes 9 %    % Eosinophils 2 %    % Basophils 1 %    % Immature Granulocytes 0 %    NRBCs per 100 WBC 0 <1 /100    Absolute Neutrophils 5.4 1.6 - 8.3 10e3/uL    Absolute Lymphocytes 2.0 0.8 - 5.3 10e3/uL    Absolute Monocytes 0.7 0.0 - 1.3 10e3/uL    Absolute Eosinophils 0.2 0.0 - 0.7 10e3/uL    Absolute Basophils 0.1 0.0 - 0.2 10e3/uL    Absolute Immature Granulocytes 0.0 <=0.4 10e3/uL    Absolute NRBCs 0.0 10e3/uL   Ethyl Alcohol Level   Result Value Ref Range    Alcohol, Blood <10 None detected mg/dL   Creatinine   Result Value Ref Range    Creatinine 0.53 (L) 0.60 - 1.10 mg/dL    GFR Estimate >90 >60 mL/min/1.73m2       RADIOLOGY:  Radiology reports were independently reviewed and interpreted  US Lower Extremity Venous Duplex Bilateral   Final Result   IMPRESSION:      No deep venous thrombosis in the visualized bilateral lower extremities.      XR Chest 2 Views   Final Result   IMPRESSION: Mild bibasilar atelectasis. No acute cardiopulmonary disease.      CT Abdomen Pelvis w Contrast   Final Result   IMPRESSION:    1.  Moderate acute interstitial edematous pancreatitis.   2.  Multiple newly visualized low-density lesions within the pancreatic body and tail. Differential considerations include pseudocysts and sequelae of pancreatic necrosis.    3.  New minimal nonocclusive thrombus in the splenic vein.   4.  Hepatic steatosis.           EKG:    Time: 13-Mar-2023 15:11:30  Rate: 108 bpm  QRS: 72 ms  QTC: 442 ms    Sinus tachycardia.  Cannot rule out Anterior infarct (cited on or before 12-Feb-2023)  Abnormal ECG    When compared with ECG of 13-Feb-2023 17:20,   T wave inversion less evident in Inferior leads, Nonspecific T wave abnormality no longer evident in Lateral leads.    I have independently reviewed and interpreted the EKG(s) documented above.        MEDICATIONS GIVEN IN THE EMERGENCY:  Medications   0.9% sodium chloride BOLUS (0 mLs Intravenous Stopped 3/13/23 2101)     Followed by   sodium chloride 0.9% infusion (has no administration in time range)   lidocaine 1 % 0.1-1 mL (has no administration in time range)   lidocaine (LMX4) cream (has no administration in time range)   sodium chloride (PF) 0.9% PF flush 3 mL (3 mLs Intracatheter $Given 3/13/23 2119)   sodium chloride (PF) 0.9% PF flush 3 mL (has no administration in time range)   melatonin tablet 1 mg (has no administration in time range)   enoxaparin ANTICOAGULANT (LOVENOX) injection 40 mg (40 mg Subcutaneous $Given 3/13/23 2131)   ondansetron (ZOFRAN ODT) ODT tab 4 mg (has no administration in time range)     Or   ondansetron (ZOFRAN) injection 4 mg (has no administration in time range)   prochlorperazine (COMPAZINE) injection 10 mg (has no administration in time range)     Or   prochlorperazine (COMPAZINE) tablet 10 mg (has no administration in time range)     Or   prochlorperazine (COMPAZINE) suppository 25 mg (has no administration in time range)   escitalopram (LEXAPRO) tablet 5 mg (has no administration in time range)   famotidine (PEPCID) tablet 20 mg (20 mg Oral $Given 3/13/23 2131)   hydrOXYzine (ATARAX) tablet 25 mg (has no administration in time range)     Or   hydrOXYzine (ATARAX) tablet 50 mg (has no administration in time range)   HYDROmorphone (DILAUDID) injection 1 mg (1  mg Intravenous $Given 3/13/23 1649)   iopamidol (ISOVUE-370) solution 100 mL (100 mLs Intravenous $Given 3/13/23 1710)           NEW PRESCRIPTIONS STARTED AT TODAY'S ER VISIT:  New Prescriptions    No medications on file          Prior to making a final disposition on this patient the results of patient's tests and other diagnostic studies were discussed with the patient. All questions were answered. Patient expressed understanding of the plan and was amenable to it.      FINAL DIAGNOSIS:    ICD-10-CM    1. Acute pancreatitis, unspecified complication status, unspecified pancreatitis type  K85.90                  NAME: Tere Hoyt  AGE: 42 year old female  YOB: 1980  MRN: [id number]  EVALUATION DATE & TIME: 3/13/2023  4:07 PM    PCP: Dawn, [facility]    ED PROVIDER: Rich Culp M.D.      Jh KUMAR, am serving as a scribe to document services personally performed by Dr. Rich Culp based on my observation and the provider's statements to me. IRich MD attest that Jh Beth is acting in a scribe capacity, has observed my performance of the services and has documented them in accordance with my direction.    Rich Culp M.D.  Emergency Medicine  [facility] EMERGENCY ROOM  [address] [id number] [phone]  Dept: [phone]  3/13/2023         Rich Culp MD  03/13/23 3024

## 2023-03-13 NOTE — Clinical Note
Tere Hoyt was seen and treated in our emergency department on 3/13/2023.    Discharge currently pending.      Sincerely,     [facility] Emergency Room

## 2023-03-13 NOTE — Clinical Note
Tere Lai was seen and treated in our emergency department on 3/13/2023.         Sincerely,     [facility] Emergency Room

## 2023-03-13 NOTE — H&P
[facility]    History and Physical - Hospitalist Service       Date of Admission:  3/13/2023    Assessment & Plan      Tere Hoyt is a 42 year old female admitted on 3/13/2023. She has a past medical history of alcoholism, with history of previous withdrawal seizures and reported sober for 31 days, anxiety depression, who was recently admitted February for acute pancreatitis.    On admission is hemodynamically vitally stable; was initially tachycardic on arrival.  Notable labs of mild hyponatremia 135, elevated , lipase 157, negative troponin, normal hemoglobin and WBC, MCV elevated macrocytic 106, noninfectious appearing urinalysis, chest x-ray demonstrated mild bibasilar atelectasis, CT demonstrated Moderate acute interstitial edematous pancreatitis, Multiple newly visualized low-density lesions within the pancreatic body and tail and New minimal nonocclusive thrombus in the splenic vein and Hepatic steatosis.     Obtaining additional bilateral ultrasound of lower extremities, and GI consulted.  Started on prophylactic Lovenox.  Aggressive fluid resuscitation for overnight.    -------------------    Acute pancreatitis, unspecified complication status, unspecified pancreatitis type     Alcoholism (H)  Splenic vein thrombosis    Assessment: as above.  If there were multiple clots would opt to start anticoagulation therapeutically, but the clot in the splenic region could be consistent with recurrent pancreatitis; so will ask GI to evaluate and appreciate their forthcoming input.  For now, we will start prophylactic Lovenox and obtain ultrasound of lower extremities- especially since the patient does report a history PTA of increasing lower extremity swelling, prominent at the ankles    Plan:   -Admit inpatient  -Given the risks of bleeding clinically, starting Lovenox only at prophylactic dosing  -Consult to GI, greatly appreciate  -Obtain lower extremity ultrasound to check  "for any further clots  -Low threshold to start protocol for withdrawal, CIWA  -Aggressive fluid resuscitation-currently in the ER at 125 cc/h; increase to lactated Ringer's at 150 cc/h, ordered through the night until 7 AM for reassessment  - pain control conservatively       Severe episode of recurrent major depressive disorder, without psychotic features (H)    Assessment: Fortunately she lost her  about a month ago-we will need to monitor carefully for any signs of exacerbation.  She is very anxious.  We will start hydroxyzine as needed.  She is agreeable to this.    Plan:  -Monitor clinically very carefully  -Start hydroxyzine as needed      Tobacco use disorder    Assessment: has quit for 2 months now    Plan:   -Congratulated her on tobacco cessation and encouraged ongoing cessation       Diet: Clear Liquid Diet    DVT Prophylaxis: Enoxaparin (Lovenox) SQ and Pneumatic Compression Devices  Fernando Catheter: Not present  Lines: None     Cardiac Monitoring: None  Code Status: Full Code      Clinically Significant Risk Factors Present on Admission               # Hypoalbuminemia: Lowest albumin = 2.7 g/dL at 3/13/2023  4:41 PM, will monitor as appropriate          # Obesity: Estimated body mass index is 32.92 kg/m  as calculated from the following:    Height as of this encounter: 1.575 m (5' 2\").    Weight as of this encounter: 81.6 kg (180 lb).           Disposition Plan      Expected Discharge Date: 03/15/2023                  Kamaljit Molina MD  Hospitalist Service  [facility]  Securely message with [employer] (more info)  Text page via [employer] Paging/Directory     ______________________________________________________________________    Chief Complaint   Left abdominal pain     History is obtained from the patient    History of Present Illness   Tere Hoyt is a 42 year old female who has a past medical history of alcoholism, with history of previous withdrawal seizures and " reported sober for 31 days, anxiety depression, who was recently admitted February for acute pancreatitis.  Of note, she lost her  last month.     Presents with 2-3 days of progressive left-sided abdominal pain, in the setting of alcoholism and reported sobriety for 31 days.  This has progressed to the point that the pain is unbearable, prompting ED presentation.  She has no other symptoms, no fevers or chills, headaches or overt chest pain-she notes that the abdominal pain did transiently radiate upwards to the left anterior chest; negative troponin and no breathing issues except for subjective shortness of breath secondary to pain; currently on room air.  No changes with bowel movements, no melena or hematochezia, no changes with urination.    We discussed her anxiety, she recently lost her  a month ago, and she is currently living independently, and reports having other family nearby; is FULL code.     Past Medical History    History reviewed. No pertinent past medical history.    Past Surgical History   No past surgical history on file.    Prior to Admission Medications   Prior to Admission Medications   Prescriptions Last Dose Informant Patient Reported? Taking?   Vitamin D3 (CHOLECALCIFEROL) 125 MCG (5000 UT) tablet Past Month  Yes Yes   Sig: Take 125 mcg by mouth daily as needed   escitalopram (LEXAPRO) 5 MG tablet Past Week  Yes Yes   Sig: Take 5 mg by mouth At Bedtime   famotidine (PEPCID) 20 MG tablet Past Week  Yes Yes   Sig: Take 1 Tablet (20 mg) by mouth two times a day.   levonorgestrel (MIRENA) 20 MCG/DAY IUD 3/13/2023  Yes Yes   Si each by Intrauterine route   melatonin 5 MG tablet Unknown  Yes Yes   Sig: Take 5 mg by mouth nightly as needed for sleep   multivitamin w/minerals (THERA-VIT-M) tablet Past Week  No Yes   Sig: Take 1 tablet by mouth daily   thiamine (B-1) 100 MG tablet 3/12/2023  No Yes   Sig: Take 1 tablet (100 mg) by mouth daily for 55 days      Facility-Administered  Medications: None        Review of Systems    The 10 point Review of Systems is negative other than noted in the HPI or here.      Social History   I have reviewed this patient's social history and updated it with pertinent information if needed.  Social History     Tobacco Use     Smoking status: Former     Types: Cigarettes     Smokeless tobacco: Never       Family History   Noncontributory to current scenario    Allergies   No Known Allergies     Physical Exam   Vital Signs: Temp: 98.9  F (37.2  C)   BP: 119/67 Pulse: 93   Resp: 18 SpO2: 94 % O2 Device: None (Room air)    Weight: 180 lbs 0 oz    GENERAL:  Alert, appears comfortable, in no acute distress, appears stated age   HEAD:  Normocephalic, without obvious abnormality, atraumatic   EYES:  PERRL, conjunctiva/corneas clear, no scleral icterus, EOM's intact   NOSE: Nares normal, septum midline, mucosa normal, no drainage   THROAT: Lips, mucosa, and tongue normal; teeth and gums normal, mouth moist   NECK: Supple, symmetrical, trachea midline   BACK:   Symmetric, no curvature, ROM normal   LUNGS:   Clear to auscultation bilaterally, no rales, rhonchi, or wheezing, symmetric chest rise on inhalation, respirations unlabored, on room air    CHEST WALL:  No tenderness or conspicuous deformity   HEART:  Regular rate and rhythm, S1 and S2 normal, no murmur, rub, or gallop, no conspicuous jugular venous distention noted, peripheral pulses intact    ABDOMEN:    Diffuse tenderness, worse in the lower quadrants, however still remains soft, no signs of rebound or guarding or other symptoms concerning for peritonitis; bowel sounds auscultated throughout.     EXTREMITIES: Extremities normal, atraumatic, no cyanosis or edema    SKIN: Dry to touch, no exanthems in the visualized areas   NEURO: Alert, oriented x3, moves all four extremities of their own volition    PSYCH: Cooperative and quite anxious, tearful when discussing her  who passed       Medical Decision  Making       75 MINUTES SPENT BY ME on the date of service doing chart review, history, exam, documentation & further activities per the note.      Data     I have personally reviewed the following data over the past 24 hrs:    8.4  \   12.2   / 306     135 (L) 104 5 (L) /  103   4.1 25 0.53 (L) \       ALT: 40 AST: 102 (H) AP: 93 TBILI: 1.8 (H)   ALB: 2.7 (L) TOT PROTEIN: 8.0 LIPASE: 157 (H)       Imaging results reviewed over the past 24 hrs:   Recent Results (from the past 24 hour(s))   CT Abdomen Pelvis w Contrast    Narrative    EXAM: CT ABDOMEN PELVIS W CONTRAST  LOCATION: [facility]  DATE/TIME: 3/13/2023 5:17 PM    INDICATION: Epigastric pain suspect pancreatitis  COMPARISON: Ultrasound 02/13/2023 and CT exams 2/13/2023 and 5/17/2021  TECHNIQUE: CT scan of the abdomen and pelvis was performed following injection of IV contrast. Multiplanar reformats were obtained. Dose reduction techniques were used.  CONTRAST: ISOVUE 370 100 mL    FINDINGS:   LOWER CHEST: Normal.    HEPATOBILIARY: Diffuse hepatic steatosis. Normal gallbladder and bile ducts.    PANCREAS: Moderate peripancreatic edema is similar to the prior exam. Newly visualized 0.8 cm fluid density lesion in the pancreatic body and newly visualized 0.8 cm and 1.5 cm low-density lesions in the tail.    SPLEEN: Normal.    ADRENAL GLANDS: Stable left greater than right adrenal gland calcifications.    KIDNEYS/BLADDER: Normal.    BOWEL: Normal caliber small bowel without inflammatory changes. Normal appendix. Colonic diverticulosis without convincing evidence of acute diverticulitis. Trace pelvic free fluid. No free air.    LYMPH NODES: Stable prominent upper abdominal lymph nodes which are likely reactive.    VASCULATURE: Minimal nonocclusive thrombus in the splenic vein for example image 35 series 3 and image 52 series 5.    PELVIC ORGANS: IUD in satisfactory position. Dominant left ovarian follicle.    MUSCULOSKELETAL: Normal.       Impression    IMPRESSION:   1.  Moderate acute interstitial edematous pancreatitis.  2.  Multiple newly visualized low-density lesions within the pancreatic body and tail. Differential considerations include pseudocysts and sequelae of pancreatic necrosis.  3.  New minimal nonocclusive thrombus in the splenic vein.  4.  Hepatic steatosis.   XR Chest 2 Views    Narrative    EXAM: XR CHEST 2 VIEWS  LOCATION: [facility]  DATE/TIME: 3/13/2023 5:41 PM    INDICATION: Chest pain.  COMPARISON: None.      Impression    IMPRESSION: Mild bibasilar atelectasis. No acute cardiopulmonary disease.

## 2023-03-13 NOTE — LETTER
[address]  [address] [facility] [address]  Phone: [phone]  Fax: [phone]    March 15, 2023        Tere Hoyt  [address] [facility] [address]          To whom it may concern:    RE: Tere Hoyt    Patient was seen and treated at [facility] from 3/13 through 3/15.  She is able to return to work as early as 3/16.     Please contact our team for any questions or concerns.      Sincerely,  Kamaljit Molina   Hospitalist Service  [facility]

## 2023-03-13 NOTE — LETTER
[address]  [address] [facility] [address]  Phone: [phone]  Fax: [phone]    March 15, 2023        Tere Hoyt  [address] [facility] [address]          To whom it may concern:  RE: Tere Hoyt    Patient was seen and treated at [facility] from 3/13 through 3/15.  She is able to return to work as early as 3/16.     Please contact our team for any questions or concerns.      Sincerely,  Kamaljit Molina MD   Hospitalist Service  [facility]

## 2023-03-13 NOTE — ED TRIAGE NOTES
Pt here with left sided flank/rib pain and shortness of breath. Worse with deep breath. Started over the weekend but much worse last night. Pt started on famotidine on February 20th. Reports history of inflamed pancreas and slightly enlarged liver.

## 2023-03-14 LAB
ALBUMIN SERPL-MCNC: 2.3 G/DL (ref 3.5–5)
ALP SERPL-CCNC: 71 U/L (ref 45–120)
ALT SERPL W P-5'-P-CCNC: 33 U/L (ref 0–45)
ANION GAP SERPL CALCULATED.3IONS-SCNC: 5 MMOL/L (ref 5–18)
AST SERPL W P-5'-P-CCNC: 84 U/L (ref 0–40)
ATRIAL RATE - MUSE: 108 BPM
BILIRUB DIRECT SERPL-MCNC: 0.9 MG/DL
BILIRUB SERPL-MCNC: 1.8 MG/DL (ref 0–1)
BUN SERPL-MCNC: 3 MG/DL (ref 8–22)
CALCIUM SERPL-MCNC: 8.1 MG/DL (ref 8.5–10.5)
CHLORIDE BLD-SCNC: 109 MMOL/L (ref 98–107)
CHOLEST SERPL-MCNC: 129 MG/DL
CO2 SERPL-SCNC: 22 MMOL/L (ref 22–31)
CREAT SERPL-MCNC: 0.49 MG/DL (ref 0.6–1.1)
DIASTOLIC BLOOD PRESSURE - MUSE: NORMAL MMHG
ERYTHROCYTE [DISTWIDTH] IN BLOOD BY AUTOMATED COUNT: 13.5 % (ref 10–15)
GFR SERPL CREATININE-BSD FRML MDRD: >90 ML/MIN/1.73M2
GLUCOSE BLD-MCNC: 80 MG/DL (ref 70–125)
HCT VFR BLD AUTO: 37.8 % (ref 35–47)
HDLC SERPL-MCNC: 19 MG/DL
HGB BLD-MCNC: 11.8 G/DL (ref 11.7–15.7)
INTERPRETATION ECG - MUSE: NORMAL
LDLC SERPL CALC-MCNC: 90 MG/DL
MAGNESIUM SERPL-MCNC: 1.9 MG/DL (ref 1.8–2.6)
MCH RBC QN AUTO: 33.9 PG (ref 26.5–33)
MCHC RBC AUTO-ENTMCNC: 31.2 G/DL (ref 31.5–36.5)
MCV RBC AUTO: 109 FL (ref 78–100)
P AXIS - MUSE: 40 DEGREES
PHOSPHATE SERPL-MCNC: 3.4 MG/DL (ref 2.5–4.5)
PLATELET # BLD AUTO: 261 10E3/UL (ref 150–450)
POTASSIUM BLD-SCNC: 3.8 MMOL/L (ref 3.5–5)
PR INTERVAL - MUSE: 138 MS
PROT SERPL-MCNC: 6.9 G/DL (ref 6–8)
QRS DURATION - MUSE: 72 MS
QT - MUSE: 330 MS
QTC - MUSE: 442 MS
R AXIS - MUSE: 58 DEGREES
RBC # BLD AUTO: 3.48 10E6/UL (ref 3.8–5.2)
SARS-COV-2 RNA RESP QL NAA+PROBE: NEGATIVE
SODIUM SERPL-SCNC: 136 MMOL/L (ref 136–145)
SYSTOLIC BLOOD PRESSURE - MUSE: NORMAL MMHG
T AXIS - MUSE: 17 DEGREES
TRIGL SERPL-MCNC: 99 MG/DL
VENTRICULAR RATE- MUSE: 108 BPM
WBC # BLD AUTO: 7.7 10E3/UL (ref 4–11)

## 2023-03-14 PROCEDURE — 258N000003 HC RX IP 258 OP 636: Performed by: STUDENT IN AN ORGANIZED HEALTH CARE EDUCATION/TRAINING PROGRAM

## 2023-03-14 PROCEDURE — 250N000013 HC RX MED GY IP 250 OP 250 PS 637: Performed by: STUDENT IN AN ORGANIZED HEALTH CARE EDUCATION/TRAINING PROGRAM

## 2023-03-14 PROCEDURE — 120N000001 HC R&B MED SURG/OB

## 2023-03-14 PROCEDURE — 82248 BILIRUBIN DIRECT: CPT | Performed by: STUDENT IN AN ORGANIZED HEALTH CARE EDUCATION/TRAINING PROGRAM

## 2023-03-14 PROCEDURE — 84100 ASSAY OF PHOSPHORUS: CPT | Performed by: STUDENT IN AN ORGANIZED HEALTH CARE EDUCATION/TRAINING PROGRAM

## 2023-03-14 PROCEDURE — 80053 COMPREHEN METABOLIC PANEL: CPT | Performed by: STUDENT IN AN ORGANIZED HEALTH CARE EDUCATION/TRAINING PROGRAM

## 2023-03-14 PROCEDURE — 99233 SBSQ HOSP IP/OBS HIGH 50: CPT | Performed by: STUDENT IN AN ORGANIZED HEALTH CARE EDUCATION/TRAINING PROGRAM

## 2023-03-14 PROCEDURE — 36415 COLL VENOUS BLD VENIPUNCTURE: CPT | Performed by: STUDENT IN AN ORGANIZED HEALTH CARE EDUCATION/TRAINING PROGRAM

## 2023-03-14 PROCEDURE — 85027 COMPLETE CBC AUTOMATED: CPT | Performed by: STUDENT IN AN ORGANIZED HEALTH CARE EDUCATION/TRAINING PROGRAM

## 2023-03-14 PROCEDURE — 80061 LIPID PANEL: CPT | Performed by: STUDENT IN AN ORGANIZED HEALTH CARE EDUCATION/TRAINING PROGRAM

## 2023-03-14 PROCEDURE — U0005 INFEC AGEN DETEC AMPLI PROBE: HCPCS | Performed by: STUDENT IN AN ORGANIZED HEALTH CARE EDUCATION/TRAINING PROGRAM

## 2023-03-14 PROCEDURE — 83735 ASSAY OF MAGNESIUM: CPT | Performed by: STUDENT IN AN ORGANIZED HEALTH CARE EDUCATION/TRAINING PROGRAM

## 2023-03-14 PROCEDURE — 250N000011 HC RX IP 250 OP 636: Performed by: STUDENT IN AN ORGANIZED HEALTH CARE EDUCATION/TRAINING PROGRAM

## 2023-03-14 RX ORDER — FLUMAZENIL 0.1 MG/ML
0.2 INJECTION, SOLUTION INTRAVENOUS
Status: DISCONTINUED | OUTPATIENT
Start: 2023-03-14 | End: 2023-03-15 | Stop reason: HOSPADM

## 2023-03-14 RX ORDER — DIAZEPAM 5 MG
10 TABLET ORAL EVERY 30 MIN PRN
Status: DISCONTINUED | OUTPATIENT
Start: 2023-03-14 | End: 2023-03-15 | Stop reason: HOSPADM

## 2023-03-14 RX ORDER — MULTIPLE VITAMINS W/ MINERALS TAB 9MG-400MCG
1 TAB ORAL DAILY
Status: DISCONTINUED | OUTPATIENT
Start: 2023-03-14 | End: 2023-03-15 | Stop reason: HOSPADM

## 2023-03-14 RX ORDER — FOLIC ACID 1 MG/1
1 TABLET ORAL DAILY
Status: DISCONTINUED | OUTPATIENT
Start: 2023-03-14 | End: 2023-03-15 | Stop reason: HOSPADM

## 2023-03-14 RX ORDER — HALOPERIDOL 5 MG/ML
2.5-5 INJECTION INTRAMUSCULAR EVERY 6 HOURS PRN
Status: DISCONTINUED | OUTPATIENT
Start: 2023-03-14 | End: 2023-03-15 | Stop reason: HOSPADM

## 2023-03-14 RX ORDER — SODIUM CHLORIDE, SODIUM LACTATE, POTASSIUM CHLORIDE, CALCIUM CHLORIDE 600; 310; 30; 20 MG/100ML; MG/100ML; MG/100ML; MG/100ML
INJECTION, SOLUTION INTRAVENOUS CONTINUOUS
Status: DISCONTINUED | OUTPATIENT
Start: 2023-03-14 | End: 2023-03-15 | Stop reason: HOSPADM

## 2023-03-14 RX ORDER — ACETAMINOPHEN 325 MG/1
325 TABLET ORAL EVERY 4 HOURS PRN
Status: DISCONTINUED | OUTPATIENT
Start: 2023-03-14 | End: 2023-03-15 | Stop reason: HOSPADM

## 2023-03-14 RX ORDER — DIAZEPAM 10 MG/2ML
5-10 INJECTION, SOLUTION INTRAMUSCULAR; INTRAVENOUS EVERY 30 MIN PRN
Status: DISCONTINUED | OUTPATIENT
Start: 2023-03-14 | End: 2023-03-15 | Stop reason: HOSPADM

## 2023-03-14 RX ADMIN — HYDROXYZINE HYDROCHLORIDE 25 MG: 25 TABLET ORAL at 08:02

## 2023-03-14 RX ADMIN — ACETAMINOPHEN 325 MG: 325 TABLET ORAL at 12:20

## 2023-03-14 RX ADMIN — HYDROXYZINE HYDROCHLORIDE 25 MG: 25 TABLET ORAL at 22:27

## 2023-03-14 RX ADMIN — ENOXAPARIN SODIUM 40 MG: 100 INJECTION SUBCUTANEOUS at 20:44

## 2023-03-14 RX ADMIN — MULTIPLE VITAMINS W/ MINERALS TAB 1 TABLET: TAB at 12:21

## 2023-03-14 RX ADMIN — SODIUM CHLORIDE, POTASSIUM CHLORIDE, SODIUM LACTATE AND CALCIUM CHLORIDE: 600; 310; 30; 20 INJECTION, SOLUTION INTRAVENOUS at 21:56

## 2023-03-14 RX ADMIN — ACETAMINOPHEN 325 MG: 325 TABLET ORAL at 18:20

## 2023-03-14 RX ADMIN — FAMOTIDINE 20 MG: 20 TABLET ORAL at 08:02

## 2023-03-14 RX ADMIN — ACETAMINOPHEN 325 MG: 325 TABLET ORAL at 22:28

## 2023-03-14 RX ADMIN — ESCITALOPRAM OXALATE 5 MG: 5 TABLET, FILM COATED ORAL at 22:27

## 2023-03-14 RX ADMIN — FOLIC ACID 1 MG: 1 TABLET ORAL at 12:21

## 2023-03-14 RX ADMIN — Medication 100 MG: at 12:21

## 2023-03-14 RX ADMIN — FAMOTIDINE 20 MG: 20 TABLET ORAL at 20:39

## 2023-03-14 RX ADMIN — SODIUM CHLORIDE, POTASSIUM CHLORIDE, SODIUM LACTATE AND CALCIUM CHLORIDE: 600; 310; 30; 20 INJECTION, SOLUTION INTRAVENOUS at 08:04

## 2023-03-14 ASSESSMENT — ACTIVITIES OF DAILY LIVING (ADL)
ADLS_ACUITY_SCORE: 35

## 2023-03-14 NOTE — PROGRESS NOTES
[facility]    Medicine Progress Note - Hospitalist Service    Date of Admission:  3/13/2023    Assessment & Plan      Tere Hoyt is a 42 year old female admitted on 3/13/2023. She has a past medical history of alcoholism, with history of previous withdrawal seizures and reported sober for 31 days, anxiety depression, who was recently admitted February for acute pancreatitis.    On admission is hemodynamically vitally stable; was initially tachycardic on arrival.  Notable labs of mild hyponatremia 135, elevated , lipase 157, negative troponin, normal hemoglobin and WBC, MCV elevated macrocytic 106, noninfectious appearing urinalysis, chest x-ray demonstrated mild bibasilar atelectasis, CT demonstrated Moderate acute interstitial edematous pancreatitis, Multiple newly visualized low-density lesions within the pancreatic body and tail and New minimal nonocclusive thrombus in the splenic vein and Hepatic steatosis.     Obtaining additional bilateral ultrasound of lower extremities, and GI consulted.  Started on prophylactic Lovenox.  Aggressive fluid resuscitation was ordered for overnight; unfortunately overnight nurse kept the incorrect fluids order (had opted for LR at a higher rate as was clearly noted in writer's H&P). Currently hypotensive AM 3/14 and hyperchloremic.  Bilateral lower extremities ultrasound without DVT.    -------------------    Acute pancreatitis, unspecified complication status, unspecified pancreatitis type     Alcoholism (H)  Splenic vein thrombosis    Assessment: as above.  Appreciate GI forthecoming input. No DVT so equivocal regarding whether to anticoagulate or not.    Plan:   -Given the risks of bleeding clinically, started Lovenox only at prophylactic dosing  -Consult to GI, greatly appreciate  -Obtained lower extremity ultrasounds  -Low threshold to start protocol for withdrawal, CIWA  -Aggressive fluid resuscitation- had intended to increase to  "lactated Ringer's at 150 cc/h, ordered through the night until 7 AM for reassessment but unfortunately overnight RN picked incorrect fluid order to keep (as orders were pended/released 2/2 to the ER Boarding situation)- see directly below  - pain control conservatively   - pt appears tremulous and discussed w/ bedside RN- start CIWA and monitor for withdrawal     Hyperchloremia  A- on admission and opted for lactated Ringer's at a higher rate; unfortunately overnight nurse kept incorrect order and this morning the patient remains hypotensive and is hyperchloremic-which is iatrogenic secondary to the normal saline.  P  - as intended on admit, please transition to  ccs/hr for now - discussed with day-time RN       Severe episode of recurrent major depressive disorder, without psychotic features (H)    Assessment: Fortunately she lost her  about a month ago-we will need to monitor carefully for any signs of exacerbation.  She is very anxious.  We will start hydroxyzine as needed.  She is agreeable to this.    Plan:  -Monitor clinically very carefully  -continue hydroxyzine as needed      Tobacco use disorder    Assessment: has quit for 2 months now    Plan:   -Congratulated her on tobacco cessation and encouraged ongoing cessation       Diet: Clear Liquid Diet    DVT Prophylaxis: Enoxaparin (Lovenox) SQ  Fernando Catheter: Not present  Lines: None     Cardiac Monitoring: None  Code Status: Full Code      Clinically Significant Risk Factors Present on Admission               # Hypoalbuminemia: Lowest albumin = 2.3 g/dL at 3/14/2023  6:15 AM, will monitor as appropriate          # Obesity: Estimated body mass index is 32.92 kg/m  as calculated from the following:    Height as of this encounter: 1.575 m (5' 2\").    Weight as of this encounter: 81.6 kg (180 lb).           Disposition Plan      Expected Discharge Date: 03/15/2023                  Kamaljit Molina MD  Hospitalist Service  [facility]  Securely message with [employer] (more info)  Text page via [facility] Paging/Directory   ______________________________________________________________________    Interval History   - no acute interval events  - pain is still there but feeling a bit better  -Discussed her ultrasound results and pending GI input  -We also discussed fluids for her pancreatitis  -She has no other questions    Physical Exam   Vital Signs: Temp: 98.1  F (36.7  C) Temp src: Oral BP: 94/55 Pulse: 84   Resp: 21 SpO2: 95 % O2 Device: None (Room air)    Weight: 180 lbs 0 oz    General: alert, oriented, and in no acute distress  Pulmonary: clear to auscultation bilaterally, normal respiratory effort, on room air, no rales or wheezes or evidence of accessory muscle use  CVS: regular rate and rhythm, no murmurs, rubs, or gallops; no blatant jugular venous distention; no extremity edema and extremities are warm to the touch  GI: soft, diffusely tender more so in the lower abdominal region, no signs of peritonitis, BS+, no rebound or guarding, no conspicuous organomegaly   Neuro: nonfocal, moves all extremities of own volition  Psych: appropriate    Medical Decision Making       52 MINUTES SPENT BY ME on the date of service doing chart review, history, exam, documentation & further activities per the note.      Data     I have personally reviewed the following data over the past 24 hrs:    7.7  \   11.8   / 261     136 109 (H) 3 (L) /  80   3.8 22 0.49 (L) \       ALT: 33 AST: 84 (H) AP: 71 TBILI: 1.8 (H)   ALB: 2.3 (L) TOT PROTEIN: 6.9 LIPASE: 157 (H)       Imaging results reviewed over the past 24 hrs:   Recent Results (from the past 24 hour(s))   CT Abdomen Pelvis w Contrast    Narrative    EXAM: CT ABDOMEN PELVIS W CONTRAST  LOCATION: [facility]  DATE/TIME: 3/13/2023 5:17 PM    INDICATION: Epigastric pain suspect pancreatitis  COMPARISON: Ultrasound 02/13/2023 and CT exams 2/13/2023 and 5/17/2021  TECHNIQUE: CT scan  of the abdomen and pelvis was performed following injection of IV contrast. Multiplanar reformats were obtained. Dose reduction techniques were used.  CONTRAST: ISOVUE 370 100 mL    FINDINGS:   LOWER CHEST: Normal.    HEPATOBILIARY: Diffuse hepatic steatosis. Normal gallbladder and bile ducts.    PANCREAS: Moderate peripancreatic edema is similar to the prior exam. Newly visualized 0.8 cm fluid density lesion in the pancreatic body and newly visualized 0.8 cm and 1.5 cm low-density lesions in the tail.    SPLEEN: Normal.    ADRENAL GLANDS: Stable left greater than right adrenal gland calcifications.    KIDNEYS/BLADDER: Normal.    BOWEL: Normal caliber small bowel without inflammatory changes. Normal appendix. Colonic diverticulosis without convincing evidence of acute diverticulitis. Trace pelvic free fluid. No free air.    LYMPH NODES: Stable prominent upper abdominal lymph nodes which are likely reactive.    VASCULATURE: Minimal nonocclusive thrombus in the splenic vein for example image 35 series 3 and image 52 series 5.    PELVIC ORGANS: IUD in satisfactory position. Dominant left ovarian follicle.    MUSCULOSKELETAL: Normal.      Impression    IMPRESSION:   1.  Moderate acute interstitial edematous pancreatitis.  2.  Multiple newly visualized low-density lesions within the pancreatic body and tail. Differential considerations include pseudocysts and sequelae of pancreatic necrosis.  3.  New minimal nonocclusive thrombus in the splenic vein.  4.  Hepatic steatosis.   XR Chest 2 Views    Narrative    EXAM: XR CHEST 2 VIEWS  LOCATION: [facility]  DATE/TIME: 3/13/2023 5:41 PM    INDICATION: Chest pain.  COMPARISON: None.      Impression    IMPRESSION: Mild bibasilar atelectasis. No acute cardiopulmonary disease.   US Lower Extremity Venous Duplex Bilateral    Narrative    EXAM: US LOWER EXTREMITY VENOUS DUPLEX BILATERAL  LOCATION: [facility]  DATE/TIME: 3/13/2023  8:30 PM    INDICATION: Bilateral lower extremity swelling.  COMPARISON: 02/14/2023.  TECHNIQUE: Venous Duplex ultrasound of bilateral lower extremities with and without compression, augmentation and duplex. Color flow and spectral Doppler with waveform analysis performed.    FINDINGS: Exam includes the common femoral, femoral, popliteal veins as well as segmentally visualized deep calf veins and greater saphenous vein.     RIGHT: No deep vein thrombosis. No superficial thrombophlebitis.     LEFT: No deep vein thrombosis. No superficial thrombophlebitis.       Impression    IMPRESSION:    No deep venous thrombosis in the visualized bilateral lower extremities.

## 2023-03-14 NOTE — PHARMACY-ADMISSION MEDICATION HISTORY
Pharmacy Note - Admission Medication History    Pertinent Provider Information:     Patient took some ibuprofen and Rolaids today for symptoms.     ______________________________________________________________________    Prior To Admission (PTA) med list completed and updated in EMR.       PTA Med List   Medication Sig Last Dose     escitalopram (LEXAPRO) 5 MG tablet Take 5 mg by mouth At Bedtime Past Week     famotidine (PEPCID) 20 MG tablet Take 1 Tablet (20 mg) by mouth two times a day. Past Week     levonorgestrel (MIRENA) 20 MCG/DAY IUD 1 each by Intrauterine route 3/13/2023     melatonin 5 MG tablet Take 5 mg by mouth nightly as needed for sleep Unknown     multivitamin w/minerals (THERA-VIT-M) tablet Take 1 tablet by mouth daily Past Week     thiamine (B-1) 100 MG tablet Take 1 tablet (100 mg) by mouth daily for 55 days 3/12/2023     Vitamin D3 (CHOLECALCIFEROL) 125 MCG (5000 UT) tablet Take 125 mcg by mouth daily as needed Past Month       Information source(s): [facility]  Method of interview communication: in-person    Summary of Changes to PTA Med List  New: melatonin  Discontinued: none  Changed: lupis    Patient was asked about OTC/herbal products specifically.  PTA med list reflects this.    In the past week, patient estimated taking medication this percent of the time:  50-90% due to illness and other.    Medication Affordability:       Allergies were reviewed, assessed, and updated with the patient.      Patient does not use any multi-dose medications prior to admission.    The information provided in this note is only as accurate as the sources available at the time of the update(s).    Thank you for the opportunity to participate in the care of this patient.    Annalisa Hardy [facility]  3/13/2023 7:03 PM

## 2023-03-14 NOTE — PROVIDER NOTIFICATION
[facility] NETTLES.    CT ED-FARIA  Kindly clarify the order for continuous infusion. I have LR running at 150 ml/hr, there's a linked order for NS at 125 ml/hr. Which one should I honor?  Thanks  #[id number]    MD ordered IV NS at 100 ml/hr, LR was discontinued. Order acknowledged

## 2023-03-14 NOTE — PLAN OF CARE
Problem: Plan of Care - These are the overarching goals to be used throughout the patient stay.    Goal: Absence of Hospital-Acquired Illness or Injury  Intervention: Prevent Skin Injury  Recent Flowsheet Documentation  Taken 3/14/2023 0751 by Danielle Solomon RN  Body Position: position changed independently  Goal: Optimal Comfort and Wellbeing  Intervention: Monitor Pain and Promote Comfort  Recent Flowsheet Documentation  Taken 3/14/2023 1140 by Danielle Solomon RN  Pain Management Interventions: medication (see MAR)  Taken 3/14/2023 0751 by Danielle Solomon RN  Pain Management Interventions: MD notified (comment)     Problem: Pancreatitis  Goal: Absence of Infection Signs and Symptoms  Outcome: Progressing  Goal: Optimal Nutrition Delivery  Outcome: Progressing  Goal: Optimal Pain Control and Function  Outcome: Progressing  Intervention: Monitor and Manage Pain  Recent Flowsheet Documentation  Taken 3/14/2023 1140 by Danielle Solomon RN  Pain Management Interventions: medication (see MAR)  Taken 3/14/2023 0751 by Danielle Solomon RN  Pain Management Interventions: MD notified (comment)  Goal: Effective Oxygenation and Ventilation  Intervention: Optimize Oxygenation and Ventilation  Recent Flowsheet Documentation  Taken 3/14/2023 1140 by Danielle Solomon RN  Activity Management:   activity adjusted per tolerance   ambulated to bathroom   back to bed  Taken 3/14/2023 0751 by Danielle Solomon RN  Activity Management:   activity adjusted per tolerance   ambulated to bathroom   back to bed  Head of Bed (HOB) Positioning: HOB at 20-30 degrees   Goal Outcome Evaluation:    Pt is alert and oriented and able to make all needs known.  Call light and all belongings within reach.  Up IND in room.  Pt denied abd pain but did state a mild headache.  PRN tylenol added and helpful.  Pt quite anxious this AM regarding work and other life stressors.  PRN hydroxyzine very helpful along with therapeutic communication.   Followed by GI.  Diet advanced to low fat and pt tolerated it well.  Resting comfortably.  SCD's in place.  IV fluids infusing.  CIWA protocol. Not scoring high enough for interventions.  Pt states last drink more than 30 days ago.  VSS.

## 2023-03-15 VITALS
SYSTOLIC BLOOD PRESSURE: 112 MMHG | OXYGEN SATURATION: 94 % | HEIGHT: 62 IN | BODY MASS INDEX: 33.13 KG/M2 | TEMPERATURE: 98.5 F | HEART RATE: 86 BPM | RESPIRATION RATE: 18 BRPM | WEIGHT: 180 LBS | DIASTOLIC BLOOD PRESSURE: 68 MMHG

## 2023-03-15 LAB
ALBUMIN SERPL-MCNC: 2.4 G/DL (ref 3.5–5)
ALP SERPL-CCNC: 68 U/L (ref 45–120)
ALT SERPL W P-5'-P-CCNC: 31 U/L (ref 0–45)
AST SERPL W P-5'-P-CCNC: 81 U/L (ref 0–40)
BILIRUB DIRECT SERPL-MCNC: 0.8 MG/DL
BILIRUB SERPL-MCNC: 1.5 MG/DL (ref 0–1)
HOLD SPECIMEN: NORMAL
PROT SERPL-MCNC: 7 G/DL (ref 6–8)

## 2023-03-15 PROCEDURE — 80076 HEPATIC FUNCTION PANEL: CPT | Performed by: STUDENT IN AN ORGANIZED HEALTH CARE EDUCATION/TRAINING PROGRAM

## 2023-03-15 PROCEDURE — 250N000013 HC RX MED GY IP 250 OP 250 PS 637: Performed by: STUDENT IN AN ORGANIZED HEALTH CARE EDUCATION/TRAINING PROGRAM

## 2023-03-15 PROCEDURE — 99239 HOSP IP/OBS DSCHRG MGMT >30: CPT | Performed by: STUDENT IN AN ORGANIZED HEALTH CARE EDUCATION/TRAINING PROGRAM

## 2023-03-15 PROCEDURE — 36415 COLL VENOUS BLD VENIPUNCTURE: CPT | Performed by: STUDENT IN AN ORGANIZED HEALTH CARE EDUCATION/TRAINING PROGRAM

## 2023-03-15 PROCEDURE — 258N000003 HC RX IP 258 OP 636: Performed by: STUDENT IN AN ORGANIZED HEALTH CARE EDUCATION/TRAINING PROGRAM

## 2023-03-15 RX ADMIN — SODIUM CHLORIDE, POTASSIUM CHLORIDE, SODIUM LACTATE AND CALCIUM CHLORIDE: 600; 310; 30; 20 INJECTION, SOLUTION INTRAVENOUS at 05:55

## 2023-03-15 RX ADMIN — Medication 100 MG: at 08:10

## 2023-03-15 RX ADMIN — FOLIC ACID 1 MG: 1 TABLET ORAL at 08:10

## 2023-03-15 RX ADMIN — ACETAMINOPHEN 325 MG: 325 TABLET ORAL at 10:12

## 2023-03-15 RX ADMIN — FAMOTIDINE 20 MG: 20 TABLET ORAL at 08:10

## 2023-03-15 RX ADMIN — MULTIPLE VITAMINS W/ MINERALS TAB 1 TABLET: TAB at 08:10

## 2023-03-15 ASSESSMENT — ACTIVITIES OF DAILY LIVING (ADL)
WALKING_OR_CLIMBING_STAIRS_DIFFICULTY: NO
ADLS_ACUITY_SCORE: 35
DOING_ERRANDS_INDEPENDENTLY_DIFFICULTY: NO
EATING/SWALLOWING: OTHER (SEE COMMENTS)
ADLS_ACUITY_SCORE: 35
DIFFICULTY_EATING/SWALLOWING: NO
CONCENTRATING,_REMEMBERING_OR_MAKING_DECISIONS_DIFFICULTY: NO
FALL_HISTORY_WITHIN_LAST_SIX_MONTHS: NO
WEAR_GLASSES_OR_BLIND: NO
ADLS_ACUITY_SCORE: 35
CHANGE_IN_FUNCTIONAL_STATUS_SINCE_ONSET_OF_CURRENT_ILLNESS/INJURY: NO
ADLS_ACUITY_SCORE: 18
DRESSING/BATHING_DIFFICULTY: NO
TOILETING_ISSUES: NO

## 2023-03-15 NOTE — PLAN OF CARE
Pt is doing well. She reports 4/10 pain in her LUQ. VSS on RA. A & O x4. Up independently.     Tere Haque RN on 3/14/2023 at 7:07 PM

## 2023-03-15 NOTE — PROGRESS NOTES
[facility] DISCHARGE NOTE    Patient discharged to home at 11:56 AM via ambulation. Accompanied by staff. Discharge instructions reviewed with patient, opportunity offered to ask questions. Prescriptions - None ordered for discharge. All belongings sent with patient.    Alyssia Quiroz RN

## 2023-03-15 NOTE — PLAN OF CARE
"Patient is alert and oriented, she does have some anxiety. She has pain 1-2/10 due to sleeping funky in the bed. Patient otherwise declines pain. She is ready to return home.     /68   Pulse 86   Temp 98.5  F (36.9  C) (Oral)   Resp 18   Ht 1.575 m (5' 2\")   Wt 81.6 kg (180 lb)   SpO2 94%   BMI 32.92 kg/m      "

## 2023-03-15 NOTE — PROGRESS NOTES
"GI PROGRESS NOTE  3/15/2023  Tere Hoyt  1980  [id number]    Subjective:   Patient stated feeling improved today. Tolerating low fat diet without difficulty, denies any nausea or vomiting.     Denies any current abdominal pain.      Objective:     Blood pressure 112/68, pulse 86, temperature 98.5  F (36.9  C), temperature source Oral, resp. rate 18, height 1.575 m (5' 2\"), weight 81.6 kg (180 lb), SpO2 94 %.    Body mass index is 32.92 kg/m .  Gen: NAD  GI: Non-distended, BS positive, soft, non-tender    Laboratory:    BMP  Recent Labs   Lab 03/14/23  0615 03/13/23  1853 03/13/23  1641     --  135*   POTASSIUM 3.8  --  4.1   CHLORIDE 109*  --  104   JUSTICE 8.1*  --  8.5   CO2 22  --  25   BUN 3*  --  5*   CR 0.49* 0.53* 0.56*   GLC 80  --  103     CBC  Recent Labs   Lab 03/14/23  0615 03/13/23  1641   WBC 7.7 8.4   RBC 3.48* 3.62*   HGB 11.8 12.2   HCT 37.8 38.4   * 106*   MCH 33.9* 33.7*   MCHC 31.2* 31.8   RDW 13.5 13.4    306     INRNo lab results found in last 7 days.   LFTs  Recent Labs   Lab Test 03/15/23  0718 03/14/23  0615 03/13/23  1641 02/15/23  0641 02/14/23  0714 02/13/23  1554 02/13/23  1500   ALBUMIN 2.4* 2.3* 2.7* 2.0*   < >  --  2.5*   BILITOTAL 1.5* 1.8* 1.8* 7.5*   < >  --  8.0*   ALT 31 33 40 50*   < >  --  74*   AST 81* 84* 102* 87*   < >  --  99*   PROTEIN  --   --  Negative  --   --  Negative  --    LIPASE  --   --  157* 27  --   --  23    < > = values in this interval not displayed.     Imaging:  EXAM: CT ABDOMEN PELVIS W CONTRAST  LOCATION: [facility]  DATE/TIME: 3/13/2023 5:17 PM                                                                   IMPRESSION:   1.  Moderate acute interstitial edematous pancreatitis.  2.  Multiple newly visualized low-density lesions within the pancreatic body and tail. Differential considerations include pseudocysts and sequelae of pancreatic necrosis.  3.  New minimal nonocclusive thrombus in the " splenic vein.  4.  Hepatic steatosis.     Assessment:   1. Acute pancreatitis   42 year old female with  history of obesity, C.Diff, alcoholic hepatitis, alcoholism, pancreatitis, alcohol withdrawal seizure with complication, anxiety, depression who presented with epigastric abdominal pain. Patient has been sober for 34 days.   -CT showing Moderate acute interstitial edematous pancreatitis. Multiple newly visualized low-density lesions within the pancreatic body and tail. Differential considerations include pseudocysts and sequelae of pancreatic necrosis.  - CT from 2/14/23 showed mild dilation and irregularity of the pancreatitic duct. Plan at that time for outpatient EUS, patient has this scheduled with [facility] next month.   - Normal Hgb and WBC  - Hemodynamically stable and Afebrile  - TBilli 1.5, AST 81. Normal ALT and Alk Phos. Lipase 157 on admit.      Plan:   1. Low fat diet   2. Continue tobacco and alcohol cessation  5. Patient has outpatient EUS scheduled through [facility]   6. Follow up hepatology visit, patient has this scheduled through [facility] as well.   7. GI will sign off at this time, please call with questions and concerns    Discussed with Dr. Chuck Ivey, CNP  Thank you for the opportunity to participate in the care of this patient.   Please feel free to call me with any questions or concerns.  Phone number [phone].

## 2023-03-15 NOTE — PROGRESS NOTES
Care Management Initial Consult    General Information  Assessment completed with:  ,    Type of CM/SW Visit: Chart Assessment    Communication Assessment  Patient's communication style: spoken language (English or Bilingual)         Cognitive  Cognitive/Neuro/Behavioral: .WDL except, mood/behavior                       Lifestyle & Psychosocial Needs:  Social Determinants of Health     Tobacco Use: Medium Risk     Smoking Tobacco Use: Former     Smokeless Tobacco Use: Never     Passive Exposure: Not on file   Alcohol Use: Not on file   Financial Resource Strain: Not on file   Food Insecurity: Not on file   Transportation Needs: Not on file   Physical Activity: Not on file   Stress: Not on file   Social Connections: Not on file   Intimate Partner Violence: Not on file   Depression: Not on file   Housing Stability: Not on file         Additional Information:  8:32 AM  Chart reviewed.  Anticipate discharge home, no needs.  Anticipate family transport.  Pt sober over 30 days.  SW available should needs arise.      KLEBER ColbertSW

## 2023-03-15 NOTE — PLAN OF CARE
"  Problem: Plan of Care - These are the overarching goals to be used throughout the patient stay.    Goal: Plan of Care Review  Description: The Plan of Care Review/Shift note should be completed every shift.  The Outcome Evaluation is a brief statement about your assessment that the patient is improving, declining, or no change.  This information will be displayed automatically on your shift note.  Outcome: Progressing  Goal: Patient-Specific Goal (Individualized)  Description: You can add care plan individualizations to a care plan. Examples of Individualization might be:  \"Parent requests to be called daily at 9am for status\", \"I have a hard time hearing out of my right ear\", or \"Do not touch me to wake me up as it startles me\".  Outcome: Progressing  Goal: Absence of Hospital-Acquired Illness or Injury  Outcome: Progressing  Intervention: Identify and Manage Fall Risk  Recent Flowsheet Documentation  Taken 3/14/2023 2100 by Nola Nur RN  Safety Promotion/Fall Prevention: safety round/check completed  Intervention: Prevent Skin Injury  Recent Flowsheet Documentation  Taken 3/15/2023 0030 by Nola Nur RN  Body Position: position changed independently  Taken 3/14/2023 2100 by Nola Nur RN  Body Position: position changed independently  Intervention: Prevent and Manage VTE (Venous Thromboembolism) Risk  Recent Flowsheet Documentation  Taken 3/14/2023 2100 by Nola Nur RN  VTE Prevention/Management: compression stockings off  Goal: Optimal Comfort and Wellbeing  Outcome: Progressing  Intervention: Monitor Pain and Promote Comfort  Recent Flowsheet Documentation  Taken 3/14/2023 2100 by Nola Nur RN  Pain Management Interventions: distraction  Goal: Readiness for Transition of Care  Outcome: Progressing     Problem: Pancreatitis  Goal: Fluid and Electrolyte Balance  Outcome: Progressing  Goal: Absence of Infection Signs and Symptoms  Outcome: Progressing  Goal: Optimal Nutrition " Delivery  Outcome: Progressing  Goal: Optimal Pain Control and Function  Outcome: Progressing  Intervention: Monitor and Manage Pain  Recent Flowsheet Documentation  Taken 3/14/2023 2100 by Nola Nur RN  Pain Management Interventions: distraction  Goal: Effective Oxygenation and Ventilation  Outcome: Progressing  Intervention: Optimize Oxygenation and Ventilation  Recent Flowsheet Documentation  Taken 3/15/2023 0030 by Nola Nur, RN  Activity Management: ambulated to bathroom  Head of Bed (HOB) Positioning: HOB at 20-30 degrees  Taken 3/14/2023 2100 by Nola Nur RN  Cough And Deep Breathing: done independently per patient  Activity Management: ambulated to bathroom  Head of Bed (HOB) Positioning: HOB at 20-30 degrees   Goal Outcome Evaluation:       Patient vitally stable. Pain controlled with tylenol and atarax. Iv running LR at 150 ml/hr. Voiding spontaneously and without difficulty. CWAA scores WDL. Up independently in room. Patient care and support on-going.  Nola Nur RN

## 2023-03-15 NOTE — CONSULTS
Lab confirmed MRSA in an aerobic culture of forearm 1/8/2020 [facility].     .Infection Prevention Progress Note  3/15/2023      Patient Name: Tere Hoyt [id number]  Admit Date: 3/13/2023    Infection Status as of 3/15/2023 8:38 AM: MRSA  Isolation Status as of 3/15/2023 8:38 AM: Contact     MDRO Discontinuation  Infection Prevention has reviewed this patient's chart per the MDRO D/C Policy and have taken the following action:      The patient does not qualify for discontinuation as patient does not have the required negative results. When non-qualifying reason(s) no longer apply, please contact Infection Prevention for re-evaluation.      Patient requires two consecutive negative cultures from nares prior to continuing discontinuation evaluation. Surveillance culture orders placed, date: orders can be placed if care team validates patient is not on antibiotics effective against MRSA for now nor within the past week.     Contact Precautions ordered for the following MDRO(s): MRSA    If you have any questions, please contact Infection Prevention.    Michelle Sherman, Infection Prevention

## 2023-03-15 NOTE — DISCHARGE SUMMARY
[facility]  Hospitalist Discharge Summary      Date of Admission:  3/13/2023  Date of Discharge:  3/15/2023  Discharging Provider: Kamaljit Molina MD  Discharge Service: Hospitalist Service    Discharge Diagnoses   Acute pancreatitis  abdominal pain that has improved  Decreased appetite that has improved    Follow-ups Needed After Discharge   Follow-up Appointments     Follow-up and recommended labs and tests       Follow up with primary care provider, [facility],   within a week, for hospital follow- up.  Ensure follow-up with   GI/hepatology and follow-up on ongoing alcohol cessation/sobriety               Unresulted Labs Ordered in the Past 30 Days of this Admission     No orders found from 2/11/2023 to 3/14/2023.      These results will be followed up by [facility]     Discharge Disposition   Discharged to home  Condition at discharge: Stable     Hospital Course   Tere Hoyt is a 42 year old female admitted on 3/13/2023. She has a past medical history of alcoholism, with history of previous withdrawal seizures and reported sober for 31 days, anxiety depression, who was recently admitted February for acute pancreatitis.    On admission is hemodynamically vitally stable; was initially tachycardic on arrival.  Notable labs of mild hyponatremia 135, elevated , lipase 157, negative troponin, normal hemoglobin and WBC, MCV elevated macrocytic 106, noninfectious appearing urinalysis, chest x-ray demonstrated mild bibasilar atelectasis, CT demonstrated Moderate acute interstitial edematous pancreatitis, Multiple newly visualized low-density lesions within the pancreatic body and tail and New minimal nonocclusive thrombus in the splenic vein and Hepatic steatosis.     Follow-up bilateral ultrasounds of lower extremities without DVT.  GI consulted and followed.  No indication to anticoagulate.  Patient received prophylactic Lovenox during hospitalization.  Received  aggressive fluid resuscitation and clinically started to improve.  Diet was advanced starting 3/14 and by 3/15 she was clinically back to baseline without any further abdominal pain and tolerating a low-fat diet.  She was counseled on tobacco cessation and reinforced her ongoing alcohol sobriety.  She should follow-up with GI for an outpatient EUS as well as with hepatology through [facility].  She is to see her PCP within a week for a posthospitalization visit follow-up.  She was provided a work letter on discharge.      Consultations This Hospital Stay   GASTROENTEROLOGY IP CONSULT  INFECTION PREVENTION IP CONSULT  NUTRITION SERVICES ADULT IP CONSULT    Code Status   Full Code    Time Spent on this Encounter   I, Kamaljit Molina MD, personally saw the patient today and spent greater than 30 minutes discharging this patient.       Kamaljit Molina MD  [address] [address]  Phone: [phone]  Fax: [phone]  ______________________________________________________________________    Physical Exam   Vital Signs: Temp: 98.5  F (36.9  C) Temp src: Oral BP: 112/68 Pulse: 86   Resp: 18 SpO2: 94 % O2 Device: None (Room air)    Weight: 180 lbs 0 oz    General: alert, oriented, and in no acute distress  Pulmonary: clear to auscultation bilaterally, normal respiratory effort, on room air, no rales or wheezes or evidence of accessory muscle use  CVS: regular rate and rhythm, no murmurs, rubs, or gallops; no blatant jugular venous distention; no extremity edema and extremities are warm to the touch  GI: soft, nontender, BS+, no rebound or guarding, no conspicuous organomegaly   Neuro: nonfocal, moves all extremities of own volition  Psych: appropriate             Primary Care Physician   [facility]    Discharge Orders      Reason for your hospital stay    Pancreatitis     Follow-up and recommended labs and tests     Follow up with  primary care provider, [facility], within a week, for hospital follow- up.  Ensure follow-up with GI/hepatology and follow-up on ongoing alcohol cessation/sobriety     Activity    Your activity upon discharge: activity as tolerated     Diet    Follow this diet upon discharge: Orders Placed This Encounter      Low Fat Diet       Significant Results and Procedures   Results for orders placed or performed during the hospital encounter of 03/13/23   XR Chest 2 Views    Narrative    EXAM: XR CHEST 2 VIEWS  LOCATION: [facility]  DATE/TIME: 3/13/2023 5:41 PM    INDICATION: Chest pain.  COMPARISON: None.      Impression    IMPRESSION: Mild bibasilar atelectasis. No acute cardiopulmonary disease.   CT Abdomen Pelvis w Contrast    Narrative    EXAM: CT ABDOMEN PELVIS W CONTRAST  LOCATION: [facility]  DATE/TIME: 3/13/2023 5:17 PM    INDICATION: Epigastric pain suspect pancreatitis  COMPARISON: Ultrasound 02/13/2023 and CT exams 2/13/2023 and 5/17/2021  TECHNIQUE: CT scan of the abdomen and pelvis was performed following injection of IV contrast. Multiplanar reformats were obtained. Dose reduction techniques were used.  CONTRAST: ISOVUE 370 100 mL    FINDINGS:   LOWER CHEST: Normal.    HEPATOBILIARY: Diffuse hepatic steatosis. Normal gallbladder and bile ducts.    PANCREAS: Moderate peripancreatic edema is similar to the prior exam. Newly visualized 0.8 cm fluid density lesion in the pancreatic body and newly visualized 0.8 cm and 1.5 cm low-density lesions in the tail.    SPLEEN: Normal.    ADRENAL GLANDS: Stable left greater than right adrenal gland calcifications.    KIDNEYS/BLADDER: Normal.    BOWEL: Normal caliber small bowel without inflammatory changes. Normal appendix. Colonic diverticulosis without convincing evidence of acute diverticulitis. Trace pelvic free fluid. No free air.    LYMPH NODES: Stable prominent upper abdominal lymph nodes which  are likely reactive.    VASCULATURE: Minimal nonocclusive thrombus in the splenic vein for example image 35 series 3 and image 52 series 5.    PELVIC ORGANS: IUD in satisfactory position. Dominant left ovarian follicle.    MUSCULOSKELETAL: Normal.      Impression    IMPRESSION:   1.  Moderate acute interstitial edematous pancreatitis.  2.  Multiple newly visualized low-density lesions within the pancreatic body and tail. Differential considerations include pseudocysts and sequelae of pancreatic necrosis.  3.  New minimal nonocclusive thrombus in the splenic vein.  4.  Hepatic steatosis.   US Lower Extremity Venous Duplex Bilateral    Narrative    EXAM: US LOWER EXTREMITY VENOUS DUPLEX BILATERAL  LOCATION: [facility]  DATE/TIME: 3/13/2023 8:30 PM    INDICATION: Bilateral lower extremity swelling.  COMPARISON: 02/14/2023.  TECHNIQUE: Venous Duplex ultrasound of bilateral lower extremities with and without compression, augmentation and duplex. Color flow and spectral Doppler with waveform analysis performed.    FINDINGS: Exam includes the common femoral, femoral, popliteal veins as well as segmentally visualized deep calf veins and greater saphenous vein.     RIGHT: No deep vein thrombosis. No superficial thrombophlebitis.     LEFT: No deep vein thrombosis. No superficial thrombophlebitis.       Impression    IMPRESSION:    No deep venous thrombosis in the visualized bilateral lower extremities.       Discharge Medications   Current Discharge Medication List      CONTINUE these medications which have NOT CHANGED    Details   escitalopram (LEXAPRO) 5 MG tablet Take 5 mg by mouth At Bedtime      famotidine (PEPCID) 20 MG tablet Take 1 Tablet (20 mg) by mouth two times a day.      levonorgestrel (MIRENA) 20 MCG/DAY IUD 1 each by Intrauterine route      melatonin 5 MG tablet Take 5 mg by mouth nightly as needed for sleep      multivitamin w/minerals (THERA-VIT-M) tablet Take 1 tablet by mouth  daily    Associated Diagnoses: Alcoholism (H)      thiamine (B-1) 100 MG tablet Take 1 tablet (100 mg) by mouth daily for 55 days  Qty: 55 tablet, Refills: 0    Associated Diagnoses: Alcoholism (H)      Vitamin D3 (CHOLECALCIFEROL) 125 MCG (5000 UT) tablet Take 125 mcg by mouth daily as needed           Allergies   No Known Allergies

## 2023-03-15 NOTE — CONSULTS
NUTRITION EDUCATION      REASON FOR ASSESSMENT:  RN order/Pt request - pt requested to speak w/ dietician regarding pancreatitis and GI recommended diets. thank you    NUTRITION HISTORY:  Information obtained from pt chart    Pt presented with acute pancreatitis. Pt Discharging home today and wanted to know diet guidelines  She is tolerating low fat diet currently    CURRENT DIET:  Low Fat    NUTRITION DIAGNOSIS:  Food- and nutrition-related knowledge deficit R/t acute pancreatitis evidenced by no previous low fat diet knowledge    INTERVENTIONS:    Nutrition Prescription:  Low fat diet    Implementation:      *  Nutrition Education (Content):   A)  Provided handout Pancreatitis nutrition therapy, pancreatitis label reading tips   B)  Discussed Recommend foods and not recommended foods on low fat diet. Recommended amounts of fats/oils to limit to      *  Nutrition Education (Application):   A)  Discussed current eating habits and recommended alternative food choices      *  Anticipate good compliance      *  Diet Education - refer to Education Flowsheet    Goals:      *  Patient will verbalize understanding of diet       *  All of the above goals met during the education session    Follow Up/Monitoring:      *  Provided RD contact information for future questions      *  Recommended Out-Patient Nutrition Referral, if further diet instructions are needed

## 2023-10-18 NOTE — CONSULTS
"GI CONSULT NOTE    Name: Tere Hoyt  Medical Record #: [id number]  YOB: 1980  Date of Admission: 3/13/2023  Date/Time: 3/14/2023/9:43 AM     CHIEF COMPLAINT: Pancreatitis    HISTORY OF PRESENT ILLNESS: We were asked to see Tere Hoyt by Dr. Molina for \"recurrent pancreatitis with new pancreatic lesions and minimal nonocclusive thrombus in the splenic vein.\"    Tere Hoyt is a 42 year old year old female with history of obesity, C.Diff, alcoholic hepatitis, alcoholism, pancreatitis, alcohol withdrawal seizure with complication, anxiety, depression who presented with epigastric abdominal pain.     Patient states that on 3/12/23 she started to note abdominal pain starting in the epigastric region and radiating to her left side and back. No associated symptoms of fever, nausea, vomiting, change in bowel habits. She does note that since discharge from previous pancreatitis episode (1 month ago) she has noted upper abdomen bloating and distention that does not seem to be related to eating and is unrelieved with gas neutralization medications. She has been sober from alcohol for the last 33 days, denies any drug use, denies any tobacco use (quit in 11/2022). Currently states she is hungry and has a good appetite, tolerating a clear liquid diet without difficulty. Has not utilized pain medications since yesterday, denies any current abdominal pain.     Denies any new medications, herbal or illicit drugs, recent travel, or sick contacts.     On presentation to ED noted to have slightly elevated lipase at 157, , Tbilli 1.8, normal Alk phos and ALT. . WBC normal. Hgb normal.     REVIEW OF SYSTEMS (ROS): Complete review of systems negative other than listed in HPI.    PAST MEDICAL HISTORY:  History reviewed. No pertinent past medical history.     FAMILY HISTORY:  No family history on file.    SOCIAL HISTORY:  Social History     Socioeconomic History     Marital status:      " "Spouse name: Not on file     Number of children: Not on file     Years of education: Not on file     Highest education level: Not on file   Occupational History     Not on file   Tobacco Use     Smoking status: Former     Types: Cigarettes     Smokeless tobacco: Never   Substance and Sexual Activity     Alcohol use: Not on file     Drug use: Not on file     Sexual activity: Not on file   Other Topics Concern     Not on file   Social History Narrative     Not on file     Social Determinants of Health     Financial Resource Strain: Not on file   Food Insecurity: Not on file   Transportation Needs: Not on file   Physical Activity: Not on file   Stress: Not on file   Social Connections: Not on file   Intimate Partner Violence: Not on file   Housing Stability: Not on file     MEDICATIONS PRIOR TO ADMISSION: (Not in a hospital admission)       ALLERGIES: Patient has no known allergies.    PHYSICAL EXAM:    /70 (BP Location: Right arm, Patient Position: Semi-Baig's, Cuff Size: Adult Regular)   Pulse 84   Temp 98.1  F (36.7  C) (Oral)   Resp 20   Ht 1.575 m (5' 2\")   Wt 81.6 kg (180 lb)   SpO2 97%   BMI 32.92 kg/m      GENERAL: Pleasant, no obvious distress  NECK: Supple without adenopathy  EYES: No scleral icterus  LUNGS: Clear to auscultation bilaterally  HEART: Regular rate and rhythm, S1 and S2 present, no lower extremity edema  ABDOMEN: Non-distended. Positive bowel sounds. Soft, non-tender, no guarding/rebound/mass, no obvious organomegaly  MUSKULOSKELETAL:  Warm and well perfused, moves all extremities well  SKIN: No jaundice  NEUROLOGIC: Alert and oriented  PSYCHIATRIC: Normal affect    LAB DATA:  CMP Results:   Recent Labs   Lab Test 03/14/23  0615 03/13/23  1853 03/13/23  1641 02/15/23  1248 02/15/23  0641     --  135*  --  134*   POTASSIUM 3.8  --  4.1 3.6 3.4*   CHLORIDE 109*  --  104  --  102   CO2 22  --  25  --  23   ANIONGAP 5  --  6  --  9   GLC 80  --  103  --  86   BUN 3*  --  5*  --  " 2*   CR 0.49* 0.53* 0.56*  --  0.51*   BILITOTAL 1.8*  --  1.8*  --  7.5*   ALKPHOS 71  --  93  --  161*   ALT 33  --  40  --  50*   AST 84*  --  102*  --  87*      CBC  Recent Labs   Lab 03/14/23  0615 03/13/23  1641   WBC 7.7 8.4   RBC 3.48* 3.62*   HGB 11.8 12.2   HCT 37.8 38.4   * 106*   MCH 33.9* 33.7*   MCHC 31.2* 31.8   RDW 13.5 13.4    306     INRNo lab results found in last 7 days.   Lipase   Date Value Ref Range Status   03/13/2023 157 (H) 0 - 52 U/L Final   02/15/2023 27 0 - 52 U/L Final   02/13/2023 23 0 - 52 U/L Final   05/17/2021 26 0 - 52 U/L Final     IMAGING:  EXAM: CT ABDOMEN PELVIS W CONTRAST  LOCATION: [facility]  DATE/TIME: 3/13/2023 5:17 PM     INDICATION: Epigastric pain suspect pancreatitis  COMPARISON: Ultrasound 02/13/2023 and CT exams 2/13/2023 and 5/17/2021  TECHNIQUE: CT scan of the abdomen and pelvis was performed following injection of IV contrast. Multiplanar reformats were obtained. Dose reduction techniques were used.  CONTRAST: ISOVUE 370 100 mL     FINDINGS:   LOWER CHEST: Normal.     HEPATOBILIARY: Diffuse hepatic steatosis. Normal gallbladder and bile ducts.     PANCREAS: Moderate peripancreatic edema is similar to the prior exam. Newly visualized 0.8 cm fluid density lesion in the pancreatic body and newly visualized 0.8 cm and 1.5 cm low-density lesions in the tail.     SPLEEN: Normal.     ADRENAL GLANDS: Stable left greater than right adrenal gland calcifications.     KIDNEYS/BLADDER: Normal.     BOWEL: Normal caliber small bowel without inflammatory changes. Normal appendix. Colonic diverticulosis without convincing evidence of acute diverticulitis. Trace pelvic free fluid. No free air.     LYMPH NODES: Stable prominent upper abdominal lymph nodes which are likely reactive.     VASCULATURE: Minimal nonocclusive thrombus in the splenic vein for example image 35 series 3 and image 52 series 5.     PELVIC ORGANS: IUD in satisfactory  position. Dominant left ovarian follicle.     MUSCULOSKELETAL: Normal.                                                                   IMPRESSION:   1.  Moderate acute interstitial edematous pancreatitis.  2.  Multiple newly visualized low-density lesions within the pancreatic body and tail. Differential considerations include pseudocysts and sequelae of pancreatic necrosis.  3.  New minimal nonocclusive thrombus in the splenic vein.  4.  Hepatic steatosis.    ASSESSMENT:    1. Acute pancreatitis   42 year old female with  history of obesity, C.Diff, alcoholic hepatitis, alcoholism, pancreatitis, alcohol withdrawal seizure with complication, anxiety, depression who presented with epigastric abdominal pain. Patient has been sober for 33 days.   -CT showing Moderate acute interstitial edematous pancreatitis. Multiple newly visualized low-density lesions within the pancreatic body and tail. Differential considerations include pseudocysts and sequelae of pancreatic necrosis.  - CT from 2/14/23 showed mild dilation and irregularity of the pancreatitic duct. Plan at that time for outpatient EUS, patient has this scheduled.   - Normal Hgb and WBC  - Hemodynamically stable and Afebrile  - TBilli 1.8, AST 84. Normal ALT and Alk Phos. Lipase 157.     2. Nonocclusive thrombus in the splenic vein  Seen on the CT 3/13/23. Started on prophylactic Lovenox.     PLAN:    1. Supportive cares and pain control per primary  2. Continue to trend LFTs, INR, and creatinine  3. Low fat diet as tolerated  4. Continue tobacco and alcohol cessation  5. Patient has outpatient EUS scheduled through [facility] on 4/7/2023  6. Follow up hepatology visit, patient has this scheduled through [facility]  7. GI will continue to follow, please call with questions and concerns.     Discussed with Dr. Woods who will also visit with the patient.     TIME SPENT: 60 min including chart review, patient interview and care  coordination.                                                Annalisa Ivey CNP  Thank you for the opportunity to participate in the care of this patient.   Please feel free to call me with any questions or concerns.  Phone number [phone].          ESTUARDO attending note  Patient seen and examined.  Case discussed with Annalisa Ivey nurse practitioner.  Agree with assessment and recommendation as above.  Briefly, patient with history of alcoholism presents with a second bout of pancreatitis in the last month.  She has been abstinent since her last episode.  No real triggering events other than she did admit to some dietary indiscretions over the weekend.  She has stopped smoking as well.  She has follow-up scheduled in the GI clinic at [facility].  Apparently she needs to use [employer] for her insurance purposes.  She needs an endoscopic ultrasound in about 4 to 6 weeks.  As far as the nonocclusive thrombus goes, no need for anticoagulation at this time.  Otherwise, she is feeling a good appetite and is not having any significant pain.  She tolerated liquids.  We will let her try some soft foods today.  If she does well, possible discharge home tomorrow.  Marques MARTE  20 minutes spent   Information: Selecting Yes will display possible errors in your note based on the variables you have selected. This validation is only offered as a suggestion for you. PLEASE NOTE THAT THE VALIDATION TEXT WILL BE REMOVED WHEN YOU FINALIZE YOUR NOTE. IF YOU WANT TO FAX A PRELIMINARY NOTE YOU WILL NEED TO TOGGLE THIS TO 'NO' IF YOU DO NOT WANT IT IN YOUR FAXED NOTE.

## 2024-04-07 ENCOUNTER — HOSPITAL ENCOUNTER (INPATIENT)
Facility: HOSPITAL | Age: 44
LOS: 4 days | Discharge: HOME | DRG: 377 | End: 2024-04-11
Attending: FAMILY MEDICINE | Admitting: INTERNAL MEDICINE
Payer: COMMERCIAL

## 2024-04-07 ENCOUNTER — APPOINTMENT (OUTPATIENT)
Dept: CT IMAGING | Facility: CLINIC | Age: 44
End: 2024-04-07
Attending: EMERGENCY MEDICINE
Payer: COMMERCIAL

## 2024-04-07 ENCOUNTER — HOSPITAL ENCOUNTER (EMERGENCY)
Facility: CLINIC | Age: 44
Discharge: ANOTHER HOSPITAL | End: 2024-04-07
Attending: EMERGENCY MEDICINE | Admitting: EMERGENCY MEDICINE
Payer: COMMERCIAL

## 2024-04-07 VITALS
HEART RATE: 117 BPM | OXYGEN SATURATION: 100 % | WEIGHT: 180 LBS | HEIGHT: 62 IN | SYSTOLIC BLOOD PRESSURE: 118 MMHG | RESPIRATION RATE: 24 BRPM | BODY MASS INDEX: 33.13 KG/M2 | DIASTOLIC BLOOD PRESSURE: 60 MMHG | TEMPERATURE: 99.2 F

## 2024-04-07 DIAGNOSIS — K70.30 ALCOHOLIC CIRRHOSIS, UNSPECIFIED WHETHER ASCITES PRESENT (H): ICD-10-CM

## 2024-04-07 DIAGNOSIS — F10.10 ALCOHOL ABUSE: ICD-10-CM

## 2024-04-07 DIAGNOSIS — K92.0 GASTROINTESTINAL HEMORRHAGE WITH HEMATEMESIS: Primary | ICD-10-CM

## 2024-04-07 DIAGNOSIS — E87.6 HYPOKALEMIA: ICD-10-CM

## 2024-04-07 DIAGNOSIS — E87.1 HYPONATREMIA: ICD-10-CM

## 2024-04-07 DIAGNOSIS — R56.9 ALCOHOL WITHDRAWAL SEIZURE WITH COMPLICATION (H): ICD-10-CM

## 2024-04-07 DIAGNOSIS — K92.0 HEMATEMESIS WITH NAUSEA: ICD-10-CM

## 2024-04-07 DIAGNOSIS — D62 ANEMIA DUE TO BLOOD LOSS, ACUTE: ICD-10-CM

## 2024-04-07 DIAGNOSIS — F10.939 ALCOHOL WITHDRAWAL SEIZURE WITH COMPLICATION (H): ICD-10-CM

## 2024-04-07 PROBLEM — K92.2 GI BLEED: Status: ACTIVE | Noted: 2024-04-07

## 2024-04-07 PROBLEM — D64.9 ANEMIA, UNSPECIFIED TYPE: Status: ACTIVE | Noted: 2024-04-07

## 2024-04-07 LAB
ABO/RH(D): NORMAL
ALBUMIN SERPL BCG-MCNC: 3.6 G/DL (ref 3.5–5.2)
ALBUMIN UR-MCNC: 20 MG/DL
ALP SERPL-CCNC: 67 U/L (ref 40–150)
ALT SERPL W P-5'-P-CCNC: 500 U/L (ref 0–50)
ANION GAP SERPL CALCULATED.3IONS-SCNC: 14 MMOL/L (ref 7–15)
ANION GAP SERPL CALCULATED.3IONS-SCNC: 33 MMOL/L (ref 7–15)
ANTIBODY SCREEN: NEGATIVE
APPEARANCE UR: CLEAR
APTT PPP: 20 SECONDS (ref 22–38)
AST SERPL W P-5'-P-CCNC: 896 U/L (ref 0–45)
ATRIAL RATE - MUSE: 132 BPM
BACTERIA #/AREA URNS HPF: ABNORMAL /HPF
BASOPHILS # BLD AUTO: 0 10E3/UL (ref 0–0.2)
BASOPHILS NFR BLD AUTO: 0 %
BILIRUB DIRECT SERPL-MCNC: 0.95 MG/DL (ref 0–0.3)
BILIRUB SERPL-MCNC: 1.7 MG/DL
BILIRUB UR QL STRIP: NEGATIVE
BLD PROD TYP BPU: NORMAL
BLD PROD TYP BPU: NORMAL
BLOOD COMPONENT TYPE: NORMAL
BLOOD COMPONENT TYPE: NORMAL
BUN SERPL-MCNC: 48.7 MG/DL (ref 6–20)
BUN SERPL-MCNC: 53.9 MG/DL (ref 6–20)
CALCIUM SERPL-MCNC: 7.3 MG/DL (ref 8.6–10)
CALCIUM SERPL-MCNC: 8.3 MG/DL (ref 8.6–10)
CHLORIDE SERPL-SCNC: 79 MMOL/L (ref 98–107)
CHLORIDE SERPL-SCNC: 92 MMOL/L (ref 98–107)
CODING SYSTEM: NORMAL
CODING SYSTEM: NORMAL
COLOR UR AUTO: ABNORMAL
CREAT SERPL-MCNC: 0.95 MG/DL (ref 0.51–0.95)
CREAT SERPL-MCNC: 0.97 MG/DL (ref 0.51–0.95)
CROSSMATCH: NORMAL
CROSSMATCH: NORMAL
DEPRECATED HCO3 PLAS-SCNC: 12 MMOL/L (ref 22–29)
DEPRECATED HCO3 PLAS-SCNC: 23 MMOL/L (ref 22–29)
DIASTOLIC BLOOD PRESSURE - MUSE: 59 MMHG
EGFRCR SERPLBLD CKD-EPI 2021: 74 ML/MIN/1.73M2
EGFRCR SERPLBLD CKD-EPI 2021: 75 ML/MIN/1.73M2
EOSINOPHIL # BLD AUTO: 0 10E3/UL (ref 0–0.7)
EOSINOPHIL NFR BLD AUTO: 0 %
ERYTHROCYTE [DISTWIDTH] IN BLOOD BY AUTOMATED COUNT: 12.8 % (ref 10–15)
ETHANOL SERPL-MCNC: <0.01 G/DL
GLUCOSE BLDC GLUCOMTR-MCNC: 174 MG/DL (ref 70–99)
GLUCOSE SERPL-MCNC: 187 MG/DL (ref 70–99)
GLUCOSE SERPL-MCNC: 189 MG/DL (ref 70–99)
GLUCOSE UR STRIP-MCNC: NEGATIVE MG/DL
HCG SERPL QL: NEGATIVE
HCT VFR BLD AUTO: 18.1 % (ref 35–47)
HGB BLD-MCNC: 6.5 G/DL (ref 11.7–15.7)
HGB BLD-MCNC: 8.3 G/DL (ref 11.7–15.7)
HGB BLD-MCNC: 8.5 G/DL (ref 11.7–15.7)
HGB UR QL STRIP: ABNORMAL
HOLD SPECIMEN: NORMAL
IMM GRANULOCYTES # BLD: 0.2 10E3/UL
IMM GRANULOCYTES NFR BLD: 2 %
INR PPP: 1.97 (ref 0.85–1.15)
INTERPRETATION ECG - MUSE: NORMAL
ISSUE DATE AND TIME: NORMAL
ISSUE DATE AND TIME: NORMAL
KETONES UR STRIP-MCNC: ABNORMAL MG/DL
LEUKOCYTE ESTERASE UR QL STRIP: NEGATIVE
LIPASE SERPL-CCNC: 210 U/L (ref 13–60)
LYMPHOCYTES # BLD AUTO: 2.5 10E3/UL (ref 0.8–5.3)
LYMPHOCYTES NFR BLD AUTO: 21 %
MAGNESIUM SERPL-MCNC: 2.1 MG/DL (ref 1.7–2.3)
MAGNESIUM SERPL-MCNC: 2.6 MG/DL (ref 1.7–2.3)
MCH RBC QN AUTO: 29.1 PG (ref 26.5–33)
MCHC RBC AUTO-ENTMCNC: 35.9 G/DL (ref 31.5–36.5)
MCV RBC AUTO: 81 FL (ref 78–100)
MONOCYTES # BLD AUTO: 1.2 10E3/UL (ref 0–1.3)
MONOCYTES NFR BLD AUTO: 10 %
MUCOUS THREADS #/AREA URNS LPF: PRESENT /LPF
NEUTROPHILS # BLD AUTO: 8 10E3/UL (ref 1.6–8.3)
NEUTROPHILS NFR BLD AUTO: 67 %
NITRATE UR QL: NEGATIVE
NRBC # BLD AUTO: 0.2 10E3/UL
NRBC BLD AUTO-RTO: 2 /100
P AXIS - MUSE: 43 DEGREES
PH UR STRIP: 7 [PH] (ref 5–7)
PHOSPHATE SERPL-MCNC: 0.7 MG/DL (ref 2.5–4.5)
PLATELET # BLD AUTO: 113 10E3/UL (ref 150–450)
POTASSIUM SERPL-SCNC: 2.8 MMOL/L (ref 3.4–5.3)
POTASSIUM SERPL-SCNC: 2.9 MMOL/L (ref 3.4–5.3)
POTASSIUM SERPL-SCNC: 2.9 MMOL/L (ref 3.4–5.3)
PR INTERVAL - MUSE: 130 MS
PROT SERPL-MCNC: 5.9 G/DL (ref 6.4–8.3)
QRS DURATION - MUSE: 80 MS
QT - MUSE: 320 MS
QTC - MUSE: 474 MS
R AXIS - MUSE: 44 DEGREES
RBC # BLD AUTO: 2.23 10E6/UL (ref 3.8–5.2)
RBC URINE: 2 /HPF
SODIUM SERPL-SCNC: 124 MMOL/L (ref 135–145)
SODIUM SERPL-SCNC: 129 MMOL/L (ref 135–145)
SP GR UR STRIP: <1.005 (ref 1–1.03)
SPECIMEN EXPIRATION DATE: NORMAL
SQUAMOUS EPITHELIAL: 6 /HPF
SYSTOLIC BLOOD PRESSURE - MUSE: 117 MMHG
T AXIS - MUSE: 45 DEGREES
UNIT ABO/RH: NORMAL
UNIT ABO/RH: NORMAL
UNIT NUMBER: NORMAL
UNIT NUMBER: NORMAL
UNIT STATUS: NORMAL
UNIT STATUS: NORMAL
UNIT TYPE ISBT: 5100
UNIT TYPE ISBT: 5100
UROBILINOGEN UR STRIP-MCNC: <2 MG/DL
VENTRICULAR RATE- MUSE: 132 BPM
WBC # BLD AUTO: 11.9 10E3/UL (ref 4–11)
WBC URINE: 3 /HPF

## 2024-04-07 PROCEDURE — 80048 BASIC METABOLIC PNL TOTAL CA: CPT | Performed by: EMERGENCY MEDICINE

## 2024-04-07 PROCEDURE — 36415 COLL VENOUS BLD VENIPUNCTURE: CPT | Performed by: INTERNAL MEDICINE

## 2024-04-07 PROCEDURE — 96375 TX/PRO/DX INJ NEW DRUG ADDON: CPT

## 2024-04-07 PROCEDURE — 85018 HEMOGLOBIN: CPT | Mod: 91 | Performed by: FAMILY MEDICINE

## 2024-04-07 PROCEDURE — 81001 URINALYSIS AUTO W/SCOPE: CPT | Performed by: EMERGENCY MEDICINE

## 2024-04-07 PROCEDURE — 82962 GLUCOSE BLOOD TEST: CPT

## 2024-04-07 PROCEDURE — 82077 ASSAY SPEC XCP UR&BREATH IA: CPT | Performed by: EMERGENCY MEDICINE

## 2024-04-07 PROCEDURE — 86923 COMPATIBILITY TEST ELECTRIC: CPT | Performed by: EMERGENCY MEDICINE

## 2024-04-07 PROCEDURE — 85610 PROTHROMBIN TIME: CPT | Performed by: EMERGENCY MEDICINE

## 2024-04-07 PROCEDURE — 93005 ELECTROCARDIOGRAM TRACING: CPT | Performed by: EMERGENCY MEDICINE

## 2024-04-07 PROCEDURE — 250N000011 HC RX IP 250 OP 636: Mod: JZ | Performed by: STUDENT IN AN ORGANIZED HEALTH CARE EDUCATION/TRAINING PROGRAM

## 2024-04-07 PROCEDURE — 96365 THER/PROPH/DIAG IV INF INIT: CPT | Mod: 59

## 2024-04-07 PROCEDURE — C9113 INJ PANTOPRAZOLE SODIUM, VIA: HCPCS | Performed by: INTERNAL MEDICINE

## 2024-04-07 PROCEDURE — 36415 COLL VENOUS BLD VENIPUNCTURE: CPT | Performed by: FAMILY MEDICINE

## 2024-04-07 PROCEDURE — 250N000011 HC RX IP 250 OP 636: Performed by: EMERGENCY MEDICINE

## 2024-04-07 PROCEDURE — 99207 PR NO CHARGE LOS: CPT | Performed by: STUDENT IN AN ORGANIZED HEALTH CARE EDUCATION/TRAINING PROGRAM

## 2024-04-07 PROCEDURE — 83735 ASSAY OF MAGNESIUM: CPT | Performed by: INTERNAL MEDICINE

## 2024-04-07 PROCEDURE — 96368 THER/DIAG CONCURRENT INF: CPT

## 2024-04-07 PROCEDURE — 250N000011 HC RX IP 250 OP 636: Performed by: INTERNAL MEDICINE

## 2024-04-07 PROCEDURE — 96376 TX/PRO/DX INJ SAME DRUG ADON: CPT

## 2024-04-07 PROCEDURE — 200N000001 HC R&B ICU

## 2024-04-07 PROCEDURE — 36430 TRANSFUSION BLD/BLD COMPNT: CPT

## 2024-04-07 PROCEDURE — 99292 CRITICAL CARE ADDL 30 MIN: CPT

## 2024-04-07 PROCEDURE — 74177 CT ABD & PELVIS W/CONTRAST: CPT

## 2024-04-07 PROCEDURE — C9113 INJ PANTOPRAZOLE SODIUM, VIA: HCPCS | Performed by: EMERGENCY MEDICINE

## 2024-04-07 PROCEDURE — 250N000009 HC RX 250: Performed by: INTERNAL MEDICINE

## 2024-04-07 PROCEDURE — 85025 COMPLETE CBC W/AUTO DIFF WBC: CPT | Performed by: EMERGENCY MEDICINE

## 2024-04-07 PROCEDURE — 258N000003 HC RX IP 258 OP 636: Performed by: STUDENT IN AN ORGANIZED HEALTH CARE EDUCATION/TRAINING PROGRAM

## 2024-04-07 PROCEDURE — 84100 ASSAY OF PHOSPHORUS: CPT | Performed by: INTERNAL MEDICINE

## 2024-04-07 PROCEDURE — 83735 ASSAY OF MAGNESIUM: CPT | Performed by: EMERGENCY MEDICINE

## 2024-04-07 PROCEDURE — 82374 ASSAY BLOOD CARBON DIOXIDE: CPT | Performed by: FAMILY MEDICINE

## 2024-04-07 PROCEDURE — 258N000003 HC RX IP 258 OP 636: Performed by: EMERGENCY MEDICINE

## 2024-04-07 PROCEDURE — 99223 1ST HOSP IP/OBS HIGH 75: CPT | Mod: AI | Performed by: INTERNAL MEDICINE

## 2024-04-07 PROCEDURE — P9016 RBC LEUKOCYTES REDUCED: HCPCS | Performed by: EMERGENCY MEDICINE

## 2024-04-07 PROCEDURE — 85018 HEMOGLOBIN: CPT | Performed by: INTERNAL MEDICINE

## 2024-04-07 PROCEDURE — 99291 CRITICAL CARE FIRST HOUR: CPT | Mod: 25

## 2024-04-07 PROCEDURE — 86900 BLOOD TYPING SEROLOGIC ABO: CPT | Performed by: EMERGENCY MEDICINE

## 2024-04-07 PROCEDURE — 96366 THER/PROPH/DIAG IV INF ADDON: CPT

## 2024-04-07 PROCEDURE — 84703 CHORIONIC GONADOTROPIN ASSAY: CPT | Performed by: EMERGENCY MEDICINE

## 2024-04-07 PROCEDURE — 84132 ASSAY OF SERUM POTASSIUM: CPT | Performed by: INTERNAL MEDICINE

## 2024-04-07 PROCEDURE — 36415 COLL VENOUS BLD VENIPUNCTURE: CPT | Performed by: EMERGENCY MEDICINE

## 2024-04-07 PROCEDURE — 82040 ASSAY OF SERUM ALBUMIN: CPT | Performed by: EMERGENCY MEDICINE

## 2024-04-07 PROCEDURE — 96367 TX/PROPH/DG ADDL SEQ IV INF: CPT

## 2024-04-07 PROCEDURE — 85730 THROMBOPLASTIN TIME PARTIAL: CPT | Performed by: EMERGENCY MEDICINE

## 2024-04-07 PROCEDURE — 83690 ASSAY OF LIPASE: CPT | Performed by: EMERGENCY MEDICINE

## 2024-04-07 PROCEDURE — 258N000003 HC RX IP 258 OP 636: Performed by: INTERNAL MEDICINE

## 2024-04-07 PROCEDURE — 250N000013 HC RX MED GY IP 250 OP 250 PS 637: Performed by: INTERNAL MEDICINE

## 2024-04-07 RX ORDER — MULTIPLE VITAMINS W/ MINERALS TAB 9MG-400MCG
1 TAB ORAL DAILY
Status: DISCONTINUED | OUTPATIENT
Start: 2024-04-08 | End: 2024-04-11 | Stop reason: HOSPADM

## 2024-04-07 RX ORDER — ACETAMINOPHEN 500 MG
500-1000 TABLET ORAL EVERY 6 HOURS PRN
Status: ON HOLD | COMMUNITY
End: 2024-04-11

## 2024-04-07 RX ORDER — DIAZEPAM 10 MG/2ML
5-10 INJECTION, SOLUTION INTRAMUSCULAR; INTRAVENOUS EVERY 30 MIN PRN
Status: DISCONTINUED | OUTPATIENT
Start: 2024-04-07 | End: 2024-04-07

## 2024-04-07 RX ORDER — IOPAMIDOL 755 MG/ML
90 INJECTION, SOLUTION INTRAVASCULAR ONCE
Status: COMPLETED | OUTPATIENT
Start: 2024-04-07 | End: 2024-04-07

## 2024-04-07 RX ORDER — FOLIC ACID 1 MG/1
1 TABLET ORAL DAILY
Status: DISCONTINUED | OUTPATIENT
Start: 2024-04-08 | End: 2024-04-08

## 2024-04-07 RX ORDER — POTASSIUM CHLORIDE 7.45 MG/ML
10 INJECTION INTRAVENOUS ONCE
Status: COMPLETED | OUTPATIENT
Start: 2024-04-07 | End: 2024-04-07

## 2024-04-07 RX ORDER — FLUMAZENIL 0.1 MG/ML
0.2 INJECTION, SOLUTION INTRAVENOUS
Status: DISCONTINUED | OUTPATIENT
Start: 2024-04-07 | End: 2024-04-11 | Stop reason: HOSPADM

## 2024-04-07 RX ORDER — LORAZEPAM 2 MG/ML
1 INJECTION INTRAMUSCULAR ONCE
Status: COMPLETED | OUTPATIENT
Start: 2024-04-07 | End: 2024-04-07

## 2024-04-07 RX ORDER — AMOXICILLIN 250 MG
1 CAPSULE ORAL 2 TIMES DAILY PRN
Status: DISCONTINUED | OUTPATIENT
Start: 2024-04-07 | End: 2024-04-11 | Stop reason: HOSPADM

## 2024-04-07 RX ORDER — HYDROMORPHONE HCL IN WATER/PF 6 MG/30 ML
0.2 PATIENT CONTROLLED ANALGESIA SYRINGE INTRAVENOUS
Status: DISCONTINUED | OUTPATIENT
Start: 2024-04-07 | End: 2024-04-10

## 2024-04-07 RX ORDER — POTASSIUM CHLORIDE 1500 MG/1
20 TABLET, EXTENDED RELEASE ORAL ONCE
Qty: 1 TABLET | Refills: 0 | Status: COMPLETED | OUTPATIENT
Start: 2024-04-08 | End: 2024-04-08

## 2024-04-07 RX ORDER — NALOXONE HYDROCHLORIDE 0.4 MG/ML
0.4 INJECTION, SOLUTION INTRAMUSCULAR; INTRAVENOUS; SUBCUTANEOUS
Status: DISCONTINUED | OUTPATIENT
Start: 2024-04-07 | End: 2024-04-11 | Stop reason: HOSPADM

## 2024-04-07 RX ORDER — NALOXONE HYDROCHLORIDE 0.4 MG/ML
0.2 INJECTION, SOLUTION INTRAMUSCULAR; INTRAVENOUS; SUBCUTANEOUS
Status: DISCONTINUED | OUTPATIENT
Start: 2024-04-07 | End: 2024-04-11 | Stop reason: HOSPADM

## 2024-04-07 RX ORDER — HALOPERIDOL 5 MG/ML
2.5-5 INJECTION INTRAMUSCULAR EVERY 6 HOURS PRN
Status: DISCONTINUED | OUTPATIENT
Start: 2024-04-07 | End: 2024-04-10

## 2024-04-07 RX ORDER — SODIUM CHLORIDE, SODIUM LACTATE, POTASSIUM CHLORIDE, CALCIUM CHLORIDE 600; 310; 30; 20 MG/100ML; MG/100ML; MG/100ML; MG/100ML
INJECTION, SOLUTION INTRAVENOUS CONTINUOUS
Status: DISCONTINUED | OUTPATIENT
Start: 2024-04-07 | End: 2024-04-07

## 2024-04-07 RX ORDER — LORAZEPAM 2 MG/ML
1-2 INJECTION INTRAMUSCULAR EVERY 30 MIN PRN
Status: DISCONTINUED | OUTPATIENT
Start: 2024-04-07 | End: 2024-04-10

## 2024-04-07 RX ORDER — HYDROMORPHONE HCL IN WATER/PF 6 MG/30 ML
0.4 PATIENT CONTROLLED ANALGESIA SYRINGE INTRAVENOUS
Status: DISCONTINUED | OUTPATIENT
Start: 2024-04-07 | End: 2024-04-10

## 2024-04-07 RX ORDER — LIDOCAINE 40 MG/G
CREAM TOPICAL
Status: DISCONTINUED | OUTPATIENT
Start: 2024-04-07 | End: 2024-04-08

## 2024-04-07 RX ORDER — OLANZAPINE 5 MG/1
5-10 TABLET, ORALLY DISINTEGRATING ORAL EVERY 6 HOURS PRN
Status: DISCONTINUED | OUTPATIENT
Start: 2024-04-07 | End: 2024-04-10

## 2024-04-07 RX ORDER — CEFTRIAXONE 1 G/1
1 INJECTION, POWDER, FOR SOLUTION INTRAMUSCULAR; INTRAVENOUS EVERY 24 HOURS
Status: DISCONTINUED | OUTPATIENT
Start: 2024-04-08 | End: 2024-04-10

## 2024-04-07 RX ORDER — ONDANSETRON 2 MG/ML
4 INJECTION INTRAMUSCULAR; INTRAVENOUS
Status: COMPLETED | OUTPATIENT
Start: 2024-04-07 | End: 2024-04-07

## 2024-04-07 RX ORDER — OCTREOTIDE ACETATE 200 UG/ML
50 INJECTION INTRAVENOUS ONCE
Status: COMPLETED | OUTPATIENT
Start: 2024-04-07 | End: 2024-04-07

## 2024-04-07 RX ORDER — AMOXICILLIN 250 MG
2 CAPSULE ORAL 2 TIMES DAILY PRN
Status: DISCONTINUED | OUTPATIENT
Start: 2024-04-07 | End: 2024-04-11 | Stop reason: HOSPADM

## 2024-04-07 RX ORDER — PROCHLORPERAZINE MALEATE 10 MG
10 TABLET ORAL EVERY 6 HOURS PRN
Status: DISCONTINUED | OUTPATIENT
Start: 2024-04-07 | End: 2024-04-08

## 2024-04-07 RX ORDER — LORAZEPAM 1 MG/1
1-2 TABLET ORAL EVERY 30 MIN PRN
Status: DISCONTINUED | OUTPATIENT
Start: 2024-04-07 | End: 2024-04-10

## 2024-04-07 RX ORDER — DIAZEPAM 5 MG
10 TABLET ORAL EVERY 30 MIN PRN
Status: DISCONTINUED | OUTPATIENT
Start: 2024-04-07 | End: 2024-04-07

## 2024-04-07 RX ORDER — POLYETHYLENE GLYCOL 3350 17 G/17G
17 POWDER, FOR SOLUTION ORAL DAILY PRN
Status: DISCONTINUED | OUTPATIENT
Start: 2024-04-07 | End: 2024-04-10

## 2024-04-07 RX ORDER — POTASSIUM CHLORIDE 1500 MG/1
40 TABLET, EXTENDED RELEASE ORAL ONCE
Qty: 2 TABLET | Refills: 0 | Status: COMPLETED | OUTPATIENT
Start: 2024-04-08 | End: 2024-04-07

## 2024-04-07 RX ORDER — CALCIUM CARBONATE 500 MG/1
1000 TABLET, CHEWABLE ORAL 4 TIMES DAILY PRN
Status: DISCONTINUED | OUTPATIENT
Start: 2024-04-07 | End: 2024-04-10

## 2024-04-07 RX ORDER — PROCHLORPERAZINE 25 MG
25 SUPPOSITORY, RECTAL RECTAL EVERY 12 HOURS PRN
Status: DISCONTINUED | OUTPATIENT
Start: 2024-04-07 | End: 2024-04-11 | Stop reason: HOSPADM

## 2024-04-07 RX ORDER — POTASSIUM CHLORIDE 7.45 MG/ML
10 INJECTION INTRAVENOUS ONCE
Status: DISCONTINUED | OUTPATIENT
Start: 2024-04-07 | End: 2024-04-07 | Stop reason: HOSPADM

## 2024-04-07 RX ORDER — CEFTRIAXONE 1 G/1
1 INJECTION, POWDER, FOR SOLUTION INTRAMUSCULAR; INTRAVENOUS ONCE
Qty: 10 ML | Refills: 0 | Status: COMPLETED | OUTPATIENT
Start: 2024-04-07 | End: 2024-04-07

## 2024-04-07 RX ORDER — ONDANSETRON 2 MG/ML
4 INJECTION INTRAMUSCULAR; INTRAVENOUS ONCE
Status: COMPLETED | OUTPATIENT
Start: 2024-04-07 | End: 2024-04-07

## 2024-04-07 RX ORDER — TRIAMCINOLONE ACETONIDE 1 MG/G
OINTMENT TOPICAL 2 TIMES DAILY PRN
COMMUNITY

## 2024-04-07 RX ORDER — LIDOCAINE 40 MG/G
CREAM TOPICAL
Status: CANCELLED | OUTPATIENT
Start: 2024-04-07

## 2024-04-07 RX ADMIN — PHYTONADIONE 10 MG: 10 INJECTION, EMULSION INTRAMUSCULAR; INTRAVENOUS; SUBCUTANEOUS at 15:36

## 2024-04-07 RX ADMIN — POTASSIUM CHLORIDE 10 MEQ: 7.46 INJECTION, SOLUTION INTRAVENOUS at 13:55

## 2024-04-07 RX ADMIN — THIAMINE HCL TAB 100 MG 100 MG: 100 TAB at 23:34

## 2024-04-07 RX ADMIN — IOPAMIDOL 90 ML: 755 INJECTION, SOLUTION INTRAVENOUS at 14:04

## 2024-04-07 RX ADMIN — OCTREOTIDE ACETATE 50 MCG: 200 INJECTION INTRAVENOUS at 15:43

## 2024-04-07 RX ADMIN — SODIUM CHLORIDE 1000 ML: 9 INJECTION, SOLUTION INTRAVENOUS at 13:54

## 2024-04-07 RX ADMIN — PANTOPRAZOLE SODIUM 80 MG: 40 INJECTION, POWDER, FOR SOLUTION INTRAVENOUS at 13:33

## 2024-04-07 RX ADMIN — POTASSIUM CHLORIDE 10 MEQ: 7.46 INJECTION, SOLUTION INTRAVENOUS at 17:20

## 2024-04-07 RX ADMIN — SODIUM PHOSPHATE, MONOBASIC, MONOHYDRATE AND SODIUM PHOSPHATE, DIBASIC, ANHYDROUS 15 MMOL: 142; 276 INJECTION, SOLUTION INTRAVENOUS at 23:45

## 2024-04-07 RX ADMIN — LORAZEPAM 1 MG: 2 INJECTION INTRAMUSCULAR; INTRAVENOUS at 15:11

## 2024-04-07 RX ADMIN — SODIUM CHLORIDE 1000 ML: 9 INJECTION, SOLUTION INTRAVENOUS at 13:32

## 2024-04-07 RX ADMIN — POTASSIUM CHLORIDE 10 MEQ: 7.46 INJECTION, SOLUTION INTRAVENOUS at 15:48

## 2024-04-07 RX ADMIN — ONDANSETRON 4 MG: 2 INJECTION INTRAMUSCULAR; INTRAVENOUS at 14:21

## 2024-04-07 RX ADMIN — ONDANSETRON 4 MG: 2 INJECTION INTRAMUSCULAR; INTRAVENOUS at 13:32

## 2024-04-07 RX ADMIN — PANTOPRAZOLE SODIUM 40 MG: 40 INJECTION, POWDER, FOR SOLUTION INTRAVENOUS at 23:34

## 2024-04-07 RX ADMIN — CEFTRIAXONE 1 G: 1 INJECTION, POWDER, FOR SOLUTION INTRAMUSCULAR; INTRAVENOUS at 15:11

## 2024-04-07 RX ADMIN — POTASSIUM CHLORIDE 40 MEQ: 1500 TABLET, EXTENDED RELEASE ORAL at 23:34

## 2024-04-07 RX ADMIN — OCTREOTIDE ACETATE 50 MCG/HR: 500 INJECTION, SOLUTION INTRAVENOUS; SUBCUTANEOUS at 15:42

## 2024-04-07 RX ADMIN — DEXTROSE AND SODIUM CHLORIDE: 5; 900 INJECTION, SOLUTION INTRAVENOUS at 22:44

## 2024-04-07 ASSESSMENT — ACTIVITIES OF DAILY LIVING (ADL)
ADLS_ACUITY_SCORE: 35
ADLS_ACUITY_SCORE: 23
ADLS_ACUITY_SCORE: 35

## 2024-04-07 ASSESSMENT — COLUMBIA-SUICIDE SEVERITY RATING SCALE - C-SSRS
4. HAVE YOU HAD THESE THOUGHTS AND HAD SOME INTENTION OF ACTING ON THEM?: NO
5. HAVE YOU STARTED TO WORK OUT OR WORKED OUT THE DETAILS OF HOW TO KILL YOURSELF? DO YOU INTEND TO CARRY OUT THIS PLAN?: NO
1. IN THE PAST MONTH, HAVE YOU WISHED YOU WERE DEAD OR WISHED YOU COULD GO TO SLEEP AND NOT WAKE UP?: NO
6. HAVE YOU EVER DONE ANYTHING, STARTED TO DO ANYTHING, OR PREPARED TO DO ANYTHING TO END YOUR LIFE?: NO
2. HAVE YOU ACTUALLY HAD ANY THOUGHTS OF KILLING YOURSELF IN THE PAST MONTH?: YES
3. HAVE YOU BEEN THINKING ABOUT HOW YOU MIGHT KILL YOURSELF?: NO

## 2024-04-07 NOTE — ED NOTES
EMERGENCY DEPARTMENT SIGN OUT NOTE        ED COURSE AND MEDICAL DECISION MAKING  3:48 PM Patient was signed out to me by Dr Red Rowland.  In brief, Tere Hoyt is a 43 year old female who initially presented with vomiting, patient has a history of alcohol dependence with history of prolonged sobriety for about a year but then started drinking again.  Now with 2 days of intermittent hematemesis.  Initially here patient is tachycardic, blood pressure did get a little bit low but has recovered with fluids.  Labs independently interpreted by me with hemoglobin 6.5, elevated AST and ALT but only minimally elevated bilirubin, mildly elevated lipase, hypokalemia, hyponatremia.  Patient has received potassium and normal saline, blood products in process.  CT of the abdomen does not demonstrate any active hemorrhage, by report prior EUS did not demonstrate varices.  Protonix and octreotide given along with vitamin K and plan for admission.  4:07 PM care discussed with Dr. Roca, intensivist.  No further recommendations at this time, patient remains tachycardic but otherwise vitally stable and heart rate is improving, currently 115 with blood pressure 131/68.  4:48 PM Hospitalist is now refusing admission due to concern for esophageal varices.  I reviewed EGD from April 2023 which demonstrated normal esophagus with some nonbleeding gastric varices in the fundus.  5:06 PM care discussed with Dr. Haney, as patient is stable currently, would defer EGD until electrolytes are corrected, can be done later tonight.  6:41 PM  care discussed with Dr. Beltran, [facility] hospitalist who accepts patient for transfer.  Updated patient with plan.  6:48 PM repeat hemoglobin is 8.3.  Gastroenterology called back, updated with plan to transfer to [facility] as he is covering there is well  7:31 PM repeat basic metabolic panel with improved hyponatremia, persistent hypokalemia and additional potassium ordered, waiting for transfer.  8:01  PM patient remained stable in the emergency department with persistent tachycardia, stable blood pressure, no further episodes of hematemesis or dark-colored stools.    Critical Care     Performed by: Dr Rich Lee  Total critical care time: 260 minutes-critical care continued from prior provider, prolonged critical care time due to critical capacity for inpatient bed and boarding  Critical care was necessary to treat or prevent imminent or life-threatening deterioration of the following conditions: GI bleed with blood transfusion, emergent scope, hemoglobin less than 8  Critical care was time spent personally by me on the following activities: development of treatment plan with patient or surrogate, discussions with consultants, examination of patient, evaluation of patient's response to treatment, obtaining history from patient or surrogate, ordering and performing treatments and interventions, ordering and review of laboratory studies, ordering and review of radiographic studies, re-evaluation of patient's condition and monitoring for potential decompensation.  Critical care time was exclusive of separately billable procedures and treating other patients.      FINAL IMPRESSION    1. Alcohol abuse    2. Hematemesis with nausea    3. Hypokalemia    4. Alcoholic cirrhosis, unspecified whether ascites present (H)    5. Hyponatremia    6. Anemia due to blood loss, acute        ED MEDS  Medications   octreotide (sandoSTATIN) 1,250 mcg in D5W 250 mL (50 mcg/hr Intravenous $New Bag 4/7/24 1542)   phytonadione 10 mg in sodium chloride 0.9 % 50 mL intermittent infusion (0 mg Intravenous Stopped 4/7/24 1609)   pantoprazole (PROTONIX) IV push injection 40 mg (has no administration in time range)   potassium chloride 10 mEq in 100 mL sterile water infusion (has no administration in time range)   sodium chloride 0.9% BOLUS 1,000 mL (0 mLs Intravenous Stopped 4/7/24 1517)   pantoprazole (PROTONIX) IV push injection 80 mg  (80 mg Intravenous $Given 4/7/24 1333)   ondansetron (ZOFRAN) injection 4 mg (4 mg Intravenous $Given 4/7/24 1332)   ondansetron (ZOFRAN) injection 4 mg (4 mg Intravenous $Given 4/7/24 1421)   potassium chloride 10 mEq in 100 mL sterile water infusion (0 mEq Intravenous Stopped 4/7/24 1505)   sodium chloride 0.9% BOLUS 1,000 mL (0 mLs Intravenous Stopped 4/7/24 1707)   iopamidol (ISOVUE-370) solution 90 mL (90 mLs Intravenous $Given 4/7/24 1404)   octreotide (sandoSTATIN) bolus from bag (50 mcg Intravenous $Given 4/7/24 1543)   cefTRIAXone (ROCEPHIN) 1 g vial to attach to  mL bag for ADULTS or NS 50 mL bag for PEDS (0 g Intravenous Stopped 4/7/24 1609)   LORazepam (ATIVAN) injection 1 mg (1 mg Intravenous $Given 4/7/24 1511)   potassium chloride 10 mEq in 100 mL sterile water infusion (0 mEq Intravenous Stopped 4/7/24 1654)   potassium chloride 10 mEq in 100 mL sterile water infusion (0 mEq Intravenous Stopped 4/7/24 1818)       LAB  Labs Ordered and Resulted from Time of ED Arrival to Time of ED Departure   INR - Abnormal       Result Value    INR 1.97 (*)    PARTIAL THROMBOPLASTIN TIME - Abnormal    aPTT 20 (*)    BASIC METABOLIC PANEL - Abnormal    Sodium 124 (*)     Potassium 2.8 (*)     Chloride 79 (*)     Carbon Dioxide (CO2) 12 (*)     Anion Gap 33 (*)     Urea Nitrogen 53.9 (*)     Creatinine 0.97 (*)     GFR Estimate 74      Calcium 8.3 (*)     Glucose 187 (*)    HEPATIC FUNCTION PANEL - Abnormal    Protein Total 5.9 (*)     Albumin 3.6      Bilirubin Total 1.7 (*)     Alkaline Phosphatase 67       (*)      (*)     Bilirubin Direct 0.95 (*)    LIPASE - Abnormal    Lipase 210 (*)    MAGNESIUM - Abnormal    Magnesium 2.6 (*)    CBC WITH PLATELETS AND DIFFERENTIAL - Abnormal    WBC Count 11.9 (*)     RBC Count 2.23 (*)     Hemoglobin 6.5 (*)     Hematocrit 18.1 (*)     MCV 81      MCH 29.1      MCHC 35.9      RDW 12.8      Platelet Count 113 (*)     % Neutrophils 67      % Lymphocytes 21       % Monocytes 10      % Eosinophils 0      % Basophils 0      % Immature Granulocytes 2      NRBCs per 100 WBC 2 (*)     Absolute Neutrophils 8.0      Absolute Lymphocytes 2.5      Absolute Monocytes 1.2      Absolute Eosinophils 0.0      Absolute Basophils 0.0      Absolute Immature Granulocytes 0.2      Absolute NRBCs 0.2     BASIC METABOLIC PANEL - Abnormal    Sodium 129 (*)     Potassium 2.9 (*)     Chloride 92 (*)     Carbon Dioxide (CO2) 23      Anion Gap 14      Urea Nitrogen 48.7 (*)     Creatinine 0.95      GFR Estimate 75      Calcium 7.3 (*)     Glucose 189 (*)    HEMOGLOBIN - Abnormal    Hemoglobin 8.3 (*)    HCG QUALITATIVE PREGNANCY - Normal    hCG Serum Qualitative Negative     ETHYL ALCOHOL LEVEL - Normal    Alcohol ethyl <0.01     ROUTINE UA WITH MICROSCOPIC REFLEX TO CULTURE   TYPE AND SCREEN, ADULT    ABO/RH(D) O POS      Antibody Screen Negative      SPECIMEN EXPIRATION DATE [id number]     PREPARE RED BLOOD CELLS (UNIT)    Blood Component Type Red Blood Cells      Product Code Y3933S26      Unit Status Transfused      Unit Number [id number]      CROSSMATCH Compatible      CODING SYSTEM [id number]      ISSUE DATE AND TIME [id number]      UNIT ABO/RH O+      UNIT TYPE ISBT 5100     PREPARE RED BLOOD CELLS (UNIT)    Blood Component Type Red Blood Cells      Product Code J9287W97      Unit Status Transfused      Unit Number [id number]      CROSSMATCH Compatible      CODING SYSTEM [id number]      ISSUE DATE AND TIME [id number]      UNIT ABO/RH O+      UNIT TYPE ISBT 5100     PREPARE RED BLOOD CELLS (UNIT)   TRANSFUSE RED BLOOD CELLS (UNIT)   TRANSFUSE RED BLOOD CELLS (UNIT)   ABO/RH TYPE AND SCREEN     RADIOLOGY    CT Abdomen Pelvis w Contrast   Final Result   IMPRESSION:    1.  Cirrhosis with manifestations of portal hypertension. No suspicious hepatic observations.   2.  Mild fluid-filled distention of the stomach.   3.  No obstruction.   4.  IUD is in place.          DISCHARGE  MEDS  New Prescriptions    No medications on file       Rich Lee MD  [facility] EMERGENCY ROOM  [address] [place] [id number] [phone]       Rich Lee MD  04/07/24 2002

## 2024-04-07 NOTE — ED PROVIDER NOTES
Emergency Department Encounter     Evaluation Date & Time:   4/7/2024 12:47 PM    CHIEF COMPLAINT:  Hematemesis      Triage Note:       ED COURSE & MEDICAL DECISION MAKING:     Pt here by EMS from home for evaluation of alcohol abuse/dependence with abdominal pain/vomiting.  Pt reports being sober for the previous 1+ year, relapsed this past week when her parents, who she lives with, were out of town in [place]. Pt admits to drinking daily this past week, last drink was yesterday per her report. Reports intermittent vomiting with some blood.  Pt drinking vodka, no other ingestion or drug use. Denies fevers, bowel changes, cp/sob, cough.  Pt reports her mouth is dry and pain feels like previous pancreatitis she's had before.  Pt disheveled, unwell appearing.  Will get labs, hydrate, give protonix and zofran, ct abd/pelvis. She denies any sort of history of varices and no active hematemesis here.  Do not think she's truly withdrawing at this point as her BP is normal.  Pt will ultimately need hospitalization based on her initial evaluation.     ED Course as of 04/07/24 1552   Sun Apr 07, 2024   1308  I met with the patient to gather history and perform an initial exam.   1327 Hgb 6.5, down from 11.8 one year ago. Will order 2 units of PRBCs.  Pt signed written consent after discussion.   1347 K 2.8, will replace. Na 124, receiving NS. BUN elevated, likely some degree of upper GI bleeding and acidotic on chemistry from starvation/alcoholic ketosis.   1446 CT abd showing fluid filled stomach, no obstruction or other acute pathology. Will consult with GI for upper GI bleeding, would benefit from eventual EGD.  Pt receiving blood and will be hospitalized.     1453 I talked with GI, Dr. Haney   1456 I spoke with [facility], Dr. Haney, who will see pt. Given her history of cirrhosis, recommends octreotide, rocephin for SBP prophylaxis.  INR 1.97 and recommends IV vitamin K.  I ordered all of these. Pt remains hemodynamically  stable.  Plan for eventual EGD.   1510 Dr. Bob identified and EUS from last year showing varices, recommends ICU, which I agree with.  We are checking now to see if we have an ICU bed here.     1530 Pt re-evaluated, largely unchanged.  HR improved to 125 or so.  Receiving PRBC blood now.     1531 Awaiting to hear back if we have an ICU bed here.     1540 Most recent /66.     1551 Pt signed out to Dr. Lee pending ICU decision for here or transfer. Still finding out if we have a bed.         Medical Decision Making    History:  Supplemental history from: Documented in chart  External Record(s) reviewed: Documented in chart    Work Up:  Chart documentation includes differential considered and any EKGs or imaging independently interpreted by provider, where specified.  In additional to work up documented, I considered the following work up: Documented in chart, if applicable.    External consultation:  Discussion of management with another provider: Documented in chart, if applicable    Complicating factors:  Care impacted by chronic illness: N/A  Care affected by social determinants of health: N/A    Disposition considerations: Admit.      At the conclusion of the encounter I discussed the results of all the tests and the disposition. The questions were answered. The patient or family acknowledged understanding and was agreeable with the care plan.      MEDICATIONS GIVEN IN THE EMERGENCY DEPARTMENT:  Medications   octreotide (sandoSTATIN) 1,250 mcg in D5W 250 mL (50 mcg/hr Intravenous $New Bag 4/7/24 1542)   phytonadione 10 mg in sodium chloride 0.9 % 50 mL intermittent infusion (10 mg Intravenous $New Bag 4/7/24 1536)   potassium chloride 10 mEq in 100 mL sterile water infusion (10 mEq Intravenous $New Bag 4/7/24 1548)   pantoprazole (PROTONIX) IV push injection 40 mg (has no administration in time range)   sodium chloride 0.9% BOLUS 1,000 mL (0 mLs Intravenous Stopped 4/7/24 1517)   pantoprazole  (PROTONIX) IV push injection 80 mg (80 mg Intravenous $Given 4/7/24 1333)   ondansetron (ZOFRAN) injection 4 mg (4 mg Intravenous $Given 4/7/24 1332)   ondansetron (ZOFRAN) injection 4 mg (4 mg Intravenous $Given 4/7/24 1421)   potassium chloride 10 mEq in 100 mL sterile water infusion (0 mEq Intravenous Stopped 4/7/24 1505)   sodium chloride 0.9% BOLUS 1,000 mL (1,000 mLs Intravenous $New Bag 4/7/24 1354)   iopamidol (ISOVUE-370) solution 90 mL (90 mLs Intravenous $Given 4/7/24 1404)   octreotide (sandoSTATIN) bolus from bag (50 mcg Intravenous $Given 4/7/24 1543)   cefTRIAXone (ROCEPHIN) 1 g vial to attach to  mL bag for ADULTS or NS 50 mL bag for PEDS (1 g Intravenous $New Bag 4/7/24 1511)   LORazepam (ATIVAN) injection 1 mg (1 mg Intravenous $Given 4/7/24 1511)       NEW PRESCRIPTIONS STARTED AT TODAY'S ED VISIT:  New Prescriptions    No medications on file       HPI   HPI     Tere Hoyt is a 43 year old female with a pertinent history of alcohol withdrawal, tobacco usage, and pancreatitis who presents to this ED via EMS for evaluation of hematemesis.    Patient reports that 2 days ago she began vomiting blood intermittently. She was currently 1.5 years sober until last week due to stress and loss of her . Says she has been drinking vodka. Her last drink was yesterday morning. Patient further mentions that she has not eaten in 2 weeks and every time she tries to keep something down, it makes her sick. Endorses abdominal pain that feels like pancreatitis that she had in the past. Endorses bloody stools and feeling dry. Denies any drug use or usage of medications for other medical problems. Denies diabetes. Denies having past GI issues.     Per EMS, patient has a syncopal episode after EMS arrival at her home. Patient lives with her parents but they are out of town. Father called EMS.     REVIEW OF SYSTEMS:  See HPI      Medical History   History reviewed. No pertinent past medical  "history.    History reviewed. No pertinent surgical history.    History reviewed. No pertinent family history.    Social History     Tobacco Use    Smoking status: Former     Types: Cigarettes    Smokeless tobacco: Never       acetaminophen (TYLENOL) 500 MG tablet  melatonin 5 MG tablet  triamcinolone (KENALOG) 0.1 % external ointment        Physical Exam     Vitals:  BP 98/56   Pulse (!) 128   Temp 97.9  F (36.6  C) (Oral)   Resp 25   Ht 1.575 m (5' 2\")   Wt 81.6 kg (180 lb)   SpO2 97%   BMI 32.92 kg/m      PHYSICAL EXAM:   Physical Exam  Constitutional:       Comments: Disheveled   HENT:      Mouth/Throat:      Comments: Dry mucus membrane  Cardiovascular:      Rate and Rhythm: Tachycardia present.   Abdominal:      Tenderness: There is abdominal tenderness (Mild diffused).   Psychiatric:         Thought Content: Thought content does not include suicidal ideation.         Results     LAB:  All pertinent labs reviewed and interpreted  Labs Ordered and Resulted from Time of ED Arrival to Time of ED Departure   INR - Abnormal       Result Value    INR 1.97 (*)    PARTIAL THROMBOPLASTIN TIME - Abnormal    aPTT 20 (*)    BASIC METABOLIC PANEL - Abnormal    Sodium 124 (*)     Potassium 2.8 (*)     Chloride 79 (*)     Carbon Dioxide (CO2) 12 (*)     Anion Gap 33 (*)     Urea Nitrogen 53.9 (*)     Creatinine 0.97 (*)     GFR Estimate 74      Calcium 8.3 (*)     Glucose 187 (*)    HEPATIC FUNCTION PANEL - Abnormal    Protein Total 5.9 (*)     Albumin 3.6      Bilirubin Total 1.7 (*)     Alkaline Phosphatase 67       (*)      (*)     Bilirubin Direct 0.95 (*)    LIPASE - Abnormal    Lipase 210 (*)    MAGNESIUM - Abnormal    Magnesium 2.6 (*)    CBC WITH PLATELETS AND DIFFERENTIAL - Abnormal    WBC Count 11.9 (*)     RBC Count 2.23 (*)     Hemoglobin 6.5 (*)     Hematocrit 18.1 (*)     MCV 81      MCH 29.1      MCHC 35.9      RDW 12.8      Platelet Count 113 (*)     % Neutrophils 67      % Lymphocytes 21 "      % Monocytes 10      % Eosinophils 0      % Basophils 0      % Immature Granulocytes 2      NRBCs per 100 WBC 2 (*)     Absolute Neutrophils 8.0      Absolute Lymphocytes 2.5      Absolute Monocytes 1.2      Absolute Eosinophils 0.0      Absolute Basophils 0.0      Absolute Immature Granulocytes 0.2      Absolute NRBCs 0.2     HCG QUALITATIVE PREGNANCY - Normal    hCG Serum Qualitative Negative     ETHYL ALCOHOL LEVEL - Normal    Alcohol ethyl <0.01     ROUTINE UA WITH MICROSCOPIC REFLEX TO CULTURE   TYPE AND SCREEN, ADULT    ABO/RH(D) O POS      Antibody Screen Negative      SPECIMEN EXPIRATION DATE [id number]     PREPARE RED BLOOD CELLS (UNIT)    Blood Component Type Red Blood Cells      Product Code G7354N15      Unit Status Ready for issue      Unit Number [id number]      CROSSMATCH Compatible      CODING SYSTEM [id number]     PREPARE RED BLOOD CELLS (UNIT)    Blood Component Type Red Blood Cells      Product Code L2618S94      Unit Status Transfused      Unit Number [id number]      CROSSMATCH Compatible      CODING SYSTEM [id number]      ISSUE DATE AND TIME [id number]      UNIT ABO/RH O+      UNIT TYPE ISBT 5100     PREPARE RED BLOOD CELLS (UNIT)   TRANSFUSE RED BLOOD CELLS (UNIT)   ABO/RH TYPE AND SCREEN       RADIOLOGY:  CT Abdomen Pelvis w Contrast   Final Result   IMPRESSION:    1.  Cirrhosis with manifestations of portal hypertension. No suspicious hepatic observations.   2.  Mild fluid-filled distention of the stomach.   3.  No obstruction.   4.  IUD is in place.                   ECG:  Sinus tach, rate 132, normal intervals, no acute ischemia     I have independently reviewed and interpreted the EKG(s) documented above     PROCEDURES:  Procedures:  none      FINAL IMPRESSION:    ICD-10-CM    1. Alcohol abuse  F10.10       2. Hematemesis with nausea  K92.0       3. Anemia, unspecified type  D64.9       4. Hypokalemia  E87.6       5. Alcoholic cirrhosis, unspecified whether ascites present (H)   K70.30       6. Hyponatremia  E87.1         CRITICAL CARE  80 minutes of critical care time spent managing upper GI bleed, alcohol withdrawal.  Time spent interpreting labs/imaging, vitals, documenting, speaking with pt and consultants.  Independent of any procedures performed.        I, Mercedez Montana, am serving as a scribe to document services personally performed by Dr. Jos Rowland, based on my observations and the provider's statements to me. I, Jos Rowland, DO attest that Mercedez Montana is acting in a scribe capacity, has observed my performance of the services and has documented them in accordance with my direction.      Jos Rowland DO  Emergency Medicine  [facility] EMERGENCY ROOM  4/7/2024  12:49 PM      Jos Rowland MD  04/07/24 [id number]

## 2024-04-07 NOTE — PHARMACY-ADMISSION MEDICATION HISTORY
Pharmacy Intern Admission Medication History    Admission medication history is complete. The information provided in this note is only as accurate as the sources available at the time of the update.    Medication reconciliation/reorder completed by provider prior to medication history? No    Information Source(s): Patient and CareEverywhere/SureScripts via in-person    Changes made to PTA medication list:  Added: triamcinolone oint and Tylenol   Deleted: escitalopram, famotidine, Mirena, multivitamin, and vitamin D3   Changed: None     Allergies reviewed with patient and updates made in EHR: yes    Medication available for use during hospital stay: None     Medication History Completed By: Edd Momin 4/7/2024 2:28 PM    PTA Med List   Medication Sig Last Dose    acetaminophen (TYLENOL) 500 MG tablet Take 500-1,000 mg by mouth every 6 hours as needed for pain Unknown at PRN    melatonin 5 MG tablet Take 5 mg by mouth nightly as needed for sleep Unknown at PRN    triamcinolone (KENALOG) 0.1 % external ointment Apply topically 2 times daily as needed (Eczema) Unknown at PRN

## 2024-04-07 NOTE — ED NOTES
Second unit PRBCs completed. Pt tolerated well without s/s of infusion reaction. Pt repeatedly asking for water, continuing to inform pt is NPO. Given oral swab per provider. Plan to transfer to [facility]. Pt updated and agreeable. EMTALA signed by pt.

## 2024-04-07 NOTE — ED NOTES
Mother at bedside. Reports pt had hematemesis yesterday as well. EMS out to see pt yesterday. Pt refused to be transported to hospital yesterday.

## 2024-04-07 NOTE — CONSULTS
[facility] Consult       Name: Tere Hoyt    Medical Record #: [id number]    YOB: 1980    Date/Time: 2024/3:32 PM    Reason for Consultation: Jos Rowland MD has asked me to evaluate Tere Hoyt regarding upper gi bleed.    HPI: Patient is a 43-year-old female with history of alcohol misuse disorder, previous history of alcohol-related pancreatitis, admitted with 2-day history of intermittent dark emesis.  She has a history of alcohol misuse.  She has attended previous rehab.  She had been sober for the last year or so.  However she has been drinking this past week.  She has upper abdominal pain.  Labs here are notable for hemoglobin 6.5, lipase 210, increased LFTs.  Her BUN is elevated.  She is tachycardic but otherwise hemodynamically stable.  Her CT is suggestive of cirrhosis with portal hypertension.  Previous imaging  in 2023 with an MR abdomen revealed normal liver, CT 3/2023 showed steatosis. She does not have a previous known history of cirrhosis.     Patient Active Problem List   Diagnosis    Alcohol withdrawal seizure with complication (H)    Alcoholic ketoacidosis    Alcoholism (H)    Cervical cancer screening     delivery delivered    Eczema    Fall (on) (from) other stairs and steps, initial encounter    Herpes simplex vulvovaginitis    HSV-2 infection    Post partum depression    Severe episode of recurrent major depressive disorder, without psychotic features (H)    Supervision of normal first pregnancy    Tobacco use disorder    Vitamin D deficiency    Pancreatitis    Acute pancreatitis, unspecified complication status, unspecified pancreatitis type          Review of Systems (ROS): Pertinent items are noted in HPI.    Past Medical History:  History reviewed. No pertinent past medical history.    Medications:   Current Outpatient Medications   Medication Sig Dispense Refill    acetaminophen (TYLENOL) 500 MG tablet Take 500-1,000 mg by  "mouth every 6 hours as needed for pain      melatonin 5 MG tablet Take 5 mg by mouth nightly as needed for sleep      triamcinolone (KENALOG) 0.1 % external ointment Apply topically 2 times daily as needed (Eczema)         Allergies: Patient has no known allergies.    Family History:  History reviewed. No pertinent family history.    Social History:  Social History     Socioeconomic History    Marital status:      Spouse name: Not on file    Number of children: Not on file    Years of education: Not on file    Highest education level: Not on file   Occupational History    Not on file   Tobacco Use    Smoking status: Former     Types: Cigarettes    Smokeless tobacco: Never   Substance and Sexual Activity    Alcohol use: Not on file    Drug use: Not on file    Sexual activity: Not on file   Other Topics Concern    Not on file   Social History Narrative    Not on file     Social Determinants of Health     Financial Resource Strain: Not on file   Food Insecurity: Not on file   Transportation Needs: Not on file   Physical Activity: Not on file   Stress: Not on file   Social Connections: Not on file   Interpersonal Safety: Not on file   Housing Stability: Not on file       PHYSICAL EXAMINATION:  BP 98/56   Pulse (!) 128   Temp 97.9  F (36.6  C) (Oral)   Resp 25   Ht 1.575 m (5' 2\")   Wt 81.6 kg (180 lb)   SpO2 97%   BMI 32.92 kg/m   Body mass index is 32.92 kg/m .  General:  NAD  Gastrointestinal: soft, nondistended, upper tenderness to palpation, no rebound/guarding  Neuro: slow     I have reviewed recent laboratory studies:    LABORATORY DATA:  Recent Labs   Lab 04/07/24  1303   WBC 11.9*   RBC 2.23*   HGB 6.5*   HCT 18.1*   MCV 81   MCH 29.1   MCHC 35.9   RDW 12.8   *      Recent Labs   Lab 04/07/24  1303   *   CO2 12*   BUN 53.9*     Recent Labs   Lab 04/07/24  1303   ALKPHOS 67   *   *     Lab Results   Component Value Date    INR 1.97 (H) 04/07/2024    INR 1.19 (H) 02/14/2023 "    INR 1.13 02/13/2023       Radiology:   CT Abdomen Pelvis w Contrast [[id number]] Collected: 04/07/24 1409   Order Status: Completed Updated: 04/07/24 1434   Narrative:     EXAM: CT ABDOMEN PELVIS W CONTRAST  LOCATION: [facility]  DATE: 4/7/2024    INDICATION: abdominal pain, vomiting, etoh abuse cirrhosis  COMPARISON: 03/13/2023, hepatobiliary MRI from 11/21/2023  TECHNIQUE: CT scan of the abdomen and pelvis was performed following injection of IV contrast. Multiplanar reformats were obtained. Dose reduction techniques were used.  CONTRAST: Isovue  370 90mL    FINDINGS:  LOWER CHEST: Normal.    HEPATOBILIARY: Severe hepatic steatosis. There is mild contour nodularity. Small area of fibrosis along the falciform ligament on series 2 image 17, similar to prior exam. No biliary duct dilation.    PANCREAS: Normal.    SPLEEN: Normal.    ADRENAL GLANDS: Benign calcifications in the otherwise normal left adrenal gland. This is of no clinical significance.    KIDNEYS/BLADDER: No significant mass, stone, or hydronephrosis.    BOWEL: No obstruction or inflammatory change. Normal appendix. Mild fluid-filled distention of the stomach.    LYMPH NODES: Normal.    VASCULATURE: There are numerous upper abdominal varices in the paraesophageal and gastroesophageal region. Some of these are submucosal in distribution particularly in the fundus of the stomach. No abdominal aortic aneurysm.    PELVIC ORGANS: IUD is in place.    MUSCULOSKELETAL: Normal.   Impression:     IMPRESSION:  1.  Cirrhosis with manifestations of portal hypertension. No suspicious hepatic observations.  2.  Mild fluid-filled distention of the stomach.  3.  No obstruction.  4.  IUD is in place.       Impression:   Alcohol related cirrhosis - CT suggestive of cirrhosis with portal hypertension; MELD 16  Upper gi bleed - patient reports dark emesis for the last 2 days, bun is elevated, hgb 6.5. She is hemodynamically stable. Possible  etiologies include severe esophagitis, MW tear, gastritis, PUD, variceal bleed not excluded  Acute pancreatitis 2/2 alcohol - lipase 210  Alcoholic hepatitis   Alcohol misuse disorder  Electrolyte abnormalities - Na 124, K2.8    Recommendation:   Correct electrolytes  Protonix 40mg iv bid  Octreotide drip  Continue daily ceftriaxone  Follow stool output/color  Follow hgb, xfuse to maintain hgb ~7  EGD once electrolytes improved and transfused - likely tomorrow 4/8 unless has desi hematemesis/ becomes hemodynamically unstable and more urgent evaluation is needed  NPO for now  Ivf, pain control  Monitor for alcohol withdrawal  Alcohol cessation  Outpatient liver clinic follow up    45 minutes of total time was spent providing patient care, including patient evaluation, reviewing documentation/tests, and .    Edel Haney MD  4/7/2024/3:32 PM  [facility]

## 2024-04-07 NOTE — PROGRESS NOTES
Brief note: Paged regarding admission, initially thought to have no history of varices, however upon further chart review she does have a documented history of varices. On EUS 4/25/23 she was found to have varicies with no bleeding in the gastric fundus. Discussed with ED provider agreed that she will need ICU level of care as she likely has an ongoing GI bleed from varices with some distention of the stomach due to fluid suspect this is blood.  Before [facility] [place], ED is reaching out to see if we have ICU bed availability, if not patient will need to be transferred, if we do have ICU bed availability will need to discuss with the remote intensivist admitting this patient given her hemoglobin of 6.5 (Baseline 13.8-14.3)  known history of varices and no IR in-house, we do have GI in-house who are planning to see her today. After waiting some time I did go down to the ED and talk to Dr. Rowland who reports they will page me regarding admission once they are able to figure out the bed situation.    Bi Bob MD  Hospitalist     Addendum:Discussed with Dr. Lee in the ED who has taken over for previous provider. Discussed concerns regarding her hemoglobin drop from last documented 14.3 in December as well as her varices and GI's plan to scope patient in AM. I do not feel comfortable accepting this patient to [facility] given that GI is not scoping tonight, and we have no IR in house. He will reach back out to GI to see if a scope tonight would be possible, if so then I would feel more comfortable accepting this high risk patient, if they are unable to scope her tonight then I recommended transferring patient to a higher level of care with IR in house and GI services. Pending update at this time.

## 2024-04-07 NOTE — ED NOTES
First unit PRBCs completed. Pt tolerated well with s/s of transfusion reaction. Second unit started.

## 2024-04-07 NOTE — LETTER
[place] [address]  [address] [address]  Phone: [phone]  Fax: [phone]    April 11, 2024        Tere Hoyt  [address] [address]          To whom it may concern:    RE: Tere Hoyt    Patient has been hospitalized  and missed work 4/7/2024-4/11/2024.     Please contact me for questions or concerns.      Sincerely,      Mari Burton MD

## 2024-04-07 NOTE — ED TRIAGE NOTES
"Arrives to ED via  EMS with c/o hematemesis that began this morning. Reports ETOH use x1 week. Had been sober 1.5 years. Recent stressors with death of . Last drink yesterday morning per pt. Reports drinking 0.5L. Reports hx of withdrawal seizures 1 year ago. Speech slow. EMS reports pt having syncopal event after EMS arrival at home. Pt's father lives with pt. Father had been out of town past week when pt began drinking. Pt endorses abd pain. \"It feels like pancreastitis. I've had that before\". EMS reports pt's sats had been 88% on RA. EMS placed pt oxygen 2L/NC and sats increased to 100%. Sats 100% on arrival to ED.      Triage Assessment (Adult)       Row Name 04/07/24 1258          Triage Assessment    Airway WDL WDL        Respiratory WDL    Respiratory WDL WDL        Skin Circulation/Temperature WDL    Skin Circulation/Temperature WDL WDL        Cardiac WDL    Cardiac WDL X;rhythm     Pulse Rate & Regularity tachycardic        Peripheral/Neurovascular WDL    Peripheral Neurovascular WDL WDL        Cognitive/Neuro/Behavioral WDL    Cognitive/Neuro/Behavioral WDL WDL                     "

## 2024-04-08 ENCOUNTER — HOSPITAL ENCOUNTER (INPATIENT)
Facility: HOSPITAL | Age: 44
Setting detail: SURGERY ADMIT
End: 2024-04-08
Attending: INTERNAL MEDICINE | Admitting: INTERNAL MEDICINE
Payer: COMMERCIAL

## 2024-04-08 ENCOUNTER — APPOINTMENT (OUTPATIENT)
Dept: RADIOLOGY | Facility: HOSPITAL | Age: 44
DRG: 377 | End: 2024-04-08
Attending: INTERNAL MEDICINE
Payer: COMMERCIAL

## 2024-04-08 ENCOUNTER — APPOINTMENT (OUTPATIENT)
Dept: CT IMAGING | Facility: HOSPITAL | Age: 44
DRG: 377 | End: 2024-04-08
Attending: INTERNAL MEDICINE
Payer: COMMERCIAL

## 2024-04-08 LAB
ALBUMIN SERPL BCG-MCNC: 3.4 G/DL (ref 3.5–5.2)
ALP SERPL-CCNC: 68 U/L (ref 40–150)
ALT SERPL W P-5'-P-CCNC: 825 U/L (ref 0–50)
AMMONIA PLAS-SCNC: 26 UMOL/L (ref 11–51)
ANION GAP SERPL CALCULATED.3IONS-SCNC: 13 MMOL/L (ref 7–15)
APAP SERPL-MCNC: <5 UG/ML (ref 10–30)
AST SERPL W P-5'-P-CCNC: 1634 U/L (ref 0–45)
BILIRUB DIRECT SERPL-MCNC: 0.8 MG/DL (ref 0–0.3)
BILIRUB SERPL-MCNC: 1.3 MG/DL
BUN SERPL-MCNC: 29.3 MG/DL (ref 6–20)
CALCIUM SERPL-MCNC: 7.5 MG/DL (ref 8.6–10)
CHLORIDE SERPL-SCNC: 98 MMOL/L (ref 98–107)
CK SERPL-CCNC: 1192 U/L (ref 26–192)
CREAT SERPL-MCNC: 0.78 MG/DL (ref 0.51–0.95)
DEPRECATED HCO3 PLAS-SCNC: 24 MMOL/L (ref 22–29)
EGFRCR SERPLBLD CKD-EPI 2021: >90 ML/MIN/1.73M2
GLUCOSE BLDC GLUCOMTR-MCNC: 124 MG/DL (ref 70–99)
GLUCOSE BLDC GLUCOMTR-MCNC: 147 MG/DL (ref 70–99)
GLUCOSE SERPL-MCNC: 144 MG/DL (ref 70–99)
HBA1C MFR BLD: 5.2 %
HGB BLD-MCNC: 7.3 G/DL (ref 11.7–15.7)
HGB BLD-MCNC: 7.4 G/DL (ref 11.7–15.7)
HGB BLD-MCNC: 8.7 G/DL (ref 11.7–15.7)
INR PPP: 1.56 (ref 0.85–1.15)
MAGNESIUM SERPL-MCNC: 2.2 MG/DL (ref 1.7–2.3)
PHOSPHATE SERPL-MCNC: 0.9 MG/DL (ref 2.5–4.5)
PHOSPHATE SERPL-MCNC: 1.7 MG/DL (ref 2.5–4.5)
POTASSIUM SERPL-SCNC: 2.6 MMOL/L (ref 3.4–5.3)
POTASSIUM SERPL-SCNC: 4 MMOL/L (ref 3.4–5.3)
PROT SERPL-MCNC: 5.6 G/DL (ref 6.4–8.3)
SODIUM SERPL-SCNC: 135 MMOL/L (ref 135–145)
SODIUM UR-SCNC: NORMAL MMOL/L

## 2024-04-08 PROCEDURE — 250N000013 HC RX MED GY IP 250 OP 250 PS 637: Performed by: INTERNAL MEDICINE

## 2024-04-08 PROCEDURE — 250N000012 HC RX MED GY IP 250 OP 636 PS 637: Performed by: INTERNAL MEDICINE

## 2024-04-08 PROCEDURE — 83735 ASSAY OF MAGNESIUM: CPT | Performed by: INTERNAL MEDICINE

## 2024-04-08 PROCEDURE — 70450 CT HEAD/BRAIN W/O DYE: CPT

## 2024-04-08 PROCEDURE — 250N000009 HC RX 250: Performed by: INTERNAL MEDICINE

## 2024-04-08 PROCEDURE — 84300 ASSAY OF URINE SODIUM: CPT | Performed by: INTERNAL MEDICINE

## 2024-04-08 PROCEDURE — 36415 COLL VENOUS BLD VENIPUNCTURE: CPT | Performed by: INTERNAL MEDICINE

## 2024-04-08 PROCEDURE — C9113 INJ PANTOPRAZOLE SODIUM, VIA: HCPCS | Performed by: INTERNAL MEDICINE

## 2024-04-08 PROCEDURE — 82140 ASSAY OF AMMONIA: CPT | Performed by: INTERNAL MEDICINE

## 2024-04-08 PROCEDURE — 80074 ACUTE HEPATITIS PANEL: CPT | Performed by: INTERNAL MEDICINE

## 2024-04-08 PROCEDURE — 258N000003 HC RX IP 258 OP 636: Performed by: INTERNAL MEDICINE

## 2024-04-08 PROCEDURE — 80143 DRUG ASSAY ACETAMINOPHEN: CPT | Performed by: INTERNAL MEDICINE

## 2024-04-08 PROCEDURE — 85610 PROTHROMBIN TIME: CPT | Performed by: INTERNAL MEDICINE

## 2024-04-08 PROCEDURE — 200N000001 HC R&B ICU

## 2024-04-08 PROCEDURE — 84132 ASSAY OF SERUM POTASSIUM: CPT | Performed by: INTERNAL MEDICINE

## 2024-04-08 PROCEDURE — 71045 X-RAY EXAM CHEST 1 VIEW: CPT

## 2024-04-08 PROCEDURE — 250N000011 HC RX IP 250 OP 636: Mod: JZ,JA | Performed by: INTERNAL MEDICINE

## 2024-04-08 PROCEDURE — 82550 ASSAY OF CK (CPK): CPT | Performed by: INTERNAL MEDICINE

## 2024-04-08 PROCEDURE — 85018 HEMOGLOBIN: CPT | Performed by: INTERNAL MEDICINE

## 2024-04-08 PROCEDURE — 250N000011 HC RX IP 250 OP 636: Performed by: INTERNAL MEDICINE

## 2024-04-08 PROCEDURE — 84100 ASSAY OF PHOSPHORUS: CPT | Performed by: INTERNAL MEDICINE

## 2024-04-08 PROCEDURE — 87040 BLOOD CULTURE FOR BACTERIA: CPT | Performed by: INTERNAL MEDICINE

## 2024-04-08 PROCEDURE — 83036 HEMOGLOBIN GLYCOSYLATED A1C: CPT | Performed by: INTERNAL MEDICINE

## 2024-04-08 PROCEDURE — 82248 BILIRUBIN DIRECT: CPT | Performed by: INTERNAL MEDICINE

## 2024-04-08 PROCEDURE — 99233 SBSQ HOSP IP/OBS HIGH 50: CPT | Performed by: INTERNAL MEDICINE

## 2024-04-08 PROCEDURE — 80048 BASIC METABOLIC PNL TOTAL CA: CPT | Performed by: INTERNAL MEDICINE

## 2024-04-08 RX ORDER — LACTULOSE 10 G/15ML
20 SOLUTION ORAL 2 TIMES DAILY
Status: DISCONTINUED | OUTPATIENT
Start: 2024-04-08 | End: 2024-04-11 | Stop reason: HOSPADM

## 2024-04-08 RX ORDER — POTASSIUM CHLORIDE 7.45 MG/ML
10 INJECTION INTRAVENOUS
Status: COMPLETED | OUTPATIENT
Start: 2024-04-08 | End: 2024-04-08

## 2024-04-08 RX ORDER — LIDOCAINE 40 MG/G
CREAM TOPICAL
Status: DISCONTINUED | OUTPATIENT
Start: 2024-04-08 | End: 2024-04-08

## 2024-04-08 RX ORDER — THIAMINE HYDROCHLORIDE 100 MG/ML
100 INJECTION, SOLUTION INTRAMUSCULAR; INTRAVENOUS DAILY
Status: DISCONTINUED | OUTPATIENT
Start: 2024-04-08 | End: 2024-04-10

## 2024-04-08 RX ORDER — SODIUM CHLORIDE AND POTASSIUM CHLORIDE 150; 900 MG/100ML; MG/100ML
INJECTION, SOLUTION INTRAVENOUS CONTINUOUS
Status: DISCONTINUED | OUTPATIENT
Start: 2024-04-08 | End: 2024-04-09

## 2024-04-08 RX ORDER — FOLIC ACID 5 MG/ML
1 INJECTION, SOLUTION INTRAMUSCULAR; INTRAVENOUS; SUBCUTANEOUS DAILY
Status: DISCONTINUED | OUTPATIENT
Start: 2024-04-08 | End: 2024-04-10

## 2024-04-08 RX ORDER — LIDOCAINE 40 MG/G
CREAM TOPICAL
Status: DISCONTINUED | OUTPATIENT
Start: 2024-04-08 | End: 2024-04-09

## 2024-04-08 RX ORDER — NICOTINE POLACRILEX 4 MG
15-30 LOZENGE BUCCAL
Status: DISCONTINUED | OUTPATIENT
Start: 2024-04-08 | End: 2024-04-10

## 2024-04-08 RX ORDER — DEXTROSE MONOHYDRATE 25 G/50ML
25-50 INJECTION, SOLUTION INTRAVENOUS
Status: DISCONTINUED | OUTPATIENT
Start: 2024-04-08 | End: 2024-04-10

## 2024-04-08 RX ADMIN — SODIUM PHOSPHATE, MONOBASIC, MONOHYDRATE AND SODIUM PHOSPHATE, DIBASIC, ANHYDROUS 15 MMOL: 142; 276 INJECTION, SOLUTION INTRAVENOUS at 12:14

## 2024-04-08 RX ADMIN — LACTULOSE 20 G: 10 SOLUTION ORAL at 11:03

## 2024-04-08 RX ADMIN — PANTOPRAZOLE SODIUM 40 MG: 40 INJECTION, POWDER, FOR SOLUTION INTRAVENOUS at 22:11

## 2024-04-08 RX ADMIN — POTASSIUM CHLORIDE 10 MEQ: 7.46 INJECTION, SOLUTION INTRAVENOUS at 13:14

## 2024-04-08 RX ADMIN — POTASSIUM CHLORIDE 10 MEQ: 7.46 INJECTION, SOLUTION INTRAVENOUS at 12:14

## 2024-04-08 RX ADMIN — SODIUM PHOSPHATE, MONOBASIC, MONOHYDRATE AND SODIUM PHOSPHATE, DIBASIC, ANHYDROUS 9 MMOL: 142; 276 INJECTION, SOLUTION INTRAVENOUS at 19:43

## 2024-04-08 RX ADMIN — POTASSIUM CHLORIDE 10 MEQ: 7.46 INJECTION, SOLUTION INTRAVENOUS at 10:13

## 2024-04-08 RX ADMIN — POTASSIUM CHLORIDE 20 MEQ: 1500 TABLET, EXTENDED RELEASE ORAL at 02:38

## 2024-04-08 RX ADMIN — SODIUM PHOSPHATE, MONOBASIC, MONOHYDRATE AND SODIUM PHOSPHATE, DIBASIC, ANHYDROUS 15 MMOL: 142; 276 INJECTION, SOLUTION INTRAVENOUS at 02:17

## 2024-04-08 RX ADMIN — SODIUM PHOSPHATE, MONOBASIC, MONOHYDRATE AND SODIUM PHOSPHATE, DIBASIC, ANHYDROUS 15 MMOL: 142; 276 INJECTION, SOLUTION INTRAVENOUS at 09:15

## 2024-04-08 RX ADMIN — PANTOPRAZOLE SODIUM 40 MG: 40 INJECTION, POWDER, FOR SOLUTION INTRAVENOUS at 09:03

## 2024-04-08 RX ADMIN — DEXTROSE AND SODIUM CHLORIDE: 5; 900 INJECTION, SOLUTION INTRAVENOUS at 06:44

## 2024-04-08 RX ADMIN — POTASSIUM CHLORIDE 10 MEQ: 7.46 INJECTION, SOLUTION INTRAVENOUS at 14:27

## 2024-04-08 RX ADMIN — POTASSIUM CHLORIDE AND SODIUM CHLORIDE: 900; 150 INJECTION, SOLUTION INTRAVENOUS at 23:24

## 2024-04-08 RX ADMIN — CEFTRIAXONE SODIUM 1 G: 1 INJECTION, POWDER, FOR SOLUTION INTRAMUSCULAR; INTRAVENOUS at 13:11

## 2024-04-08 RX ADMIN — LORAZEPAM 1 MG: 2 INJECTION INTRAMUSCULAR; INTRAVENOUS at 04:21

## 2024-04-08 RX ADMIN — SODIUM PHOSPHATE, MONOBASIC, MONOHYDRATE AND SODIUM PHOSPHATE, DIBASIC, ANHYDROUS 9 MMOL: 142; 276 INJECTION, SOLUTION INTRAVENOUS at 22:09

## 2024-04-08 RX ADMIN — THIAMINE HYDROCHLORIDE 100 MG: 100 INJECTION, SOLUTION INTRAMUSCULAR; INTRAVENOUS at 10:53

## 2024-04-08 RX ADMIN — HYDROMORPHONE HYDROCHLORIDE 0.2 MG: 0.2 INJECTION, SOLUTION INTRAMUSCULAR; INTRAVENOUS; SUBCUTANEOUS at 22:58

## 2024-04-08 RX ADMIN — POTASSIUM CHLORIDE 10 MEQ: 7.46 INJECTION, SOLUTION INTRAVENOUS at 09:04

## 2024-04-08 RX ADMIN — FOLIC ACID 1 MG: 5 INJECTION, SOLUTION INTRAMUSCULAR; INTRAVENOUS; SUBCUTANEOUS at 10:54

## 2024-04-08 RX ADMIN — OCTREOTIDE ACETATE 50 MCG/HR: 500 INJECTION, SOLUTION INTRAVENOUS; SUBCUTANEOUS at 17:48

## 2024-04-08 RX ADMIN — LORAZEPAM 1 MG: 2 INJECTION INTRAMUSCULAR; INTRAVENOUS at 12:11

## 2024-04-08 RX ADMIN — INSULIN ASPART 1 UNITS: 100 INJECTION, SOLUTION INTRAVENOUS; SUBCUTANEOUS at 14:53

## 2024-04-08 RX ADMIN — HYDROMORPHONE HYDROCHLORIDE 0.2 MG: 0.2 INJECTION, SOLUTION INTRAMUSCULAR; INTRAVENOUS; SUBCUTANEOUS at 14:49

## 2024-04-08 RX ADMIN — POTASSIUM CHLORIDE AND SODIUM CHLORIDE: 900; 150 INJECTION, SOLUTION INTRAVENOUS at 14:30

## 2024-04-08 RX ADMIN — LORAZEPAM 2 MG: 2 INJECTION INTRAMUSCULAR; INTRAVENOUS at 06:44

## 2024-04-08 RX ADMIN — POTASSIUM CHLORIDE 10 MEQ: 7.46 INJECTION, SOLUTION INTRAVENOUS at 11:08

## 2024-04-08 ASSESSMENT — ACTIVITIES OF DAILY LIVING (ADL)
ADLS_ACUITY_SCORE: 23
ADLS_ACUITY_SCORE: 31
ADLS_ACUITY_SCORE: 23
DEPENDENT_IADLS:: INDEPENDENT
ADLS_ACUITY_SCORE: 31
ADLS_ACUITY_SCORE: 23
ADLS_ACUITY_SCORE: 31
ADLS_ACUITY_SCORE: 23
ADLS_ACUITY_SCORE: 31
ADLS_ACUITY_SCORE: 23
ADLS_ACUITY_SCORE: 31
ADLS_ACUITY_SCORE: 23

## 2024-04-08 NOTE — PROGRESS NOTES
[facility] - GASTROENTEROLOGY PROGRESS NOTE     SUBJECTIVE: Patient's somnolent, confused, denies vomiting although admitted to it yesterday.  She has been confused intermittently per nursing.  Hemodynamically stable       OBJECTIVE:  /62   Pulse 98   Temp 98.9  F (37.2  C) (Oral)   Resp 19   Wt 87.3 kg (192 lb 8 oz)   SpO2 98%   BMI 35.21 kg/m    Temp (24hrs), Av.5  F (36.9  C), Min:97.3  F (36.3  C), Max:99.2  F (37.3  C)    Patient Vitals for the past 72 hrs:   Weight   24 211 87.3 kg (192 lb 8 oz)        PHYSICAL EXAM  GEN: Somnolent, no distress  ABD: Soft, non-tender  Neuro: Positive asterixis, confused, poor historian.    Additional Data:  I have reviewed the patient's new clinical lab results:   Recent Labs   Lab Test 24  0724  1303 23  0615 23  1641 02/15/23  0641 23  0714 23  1500   WBC  --   --   --  11.9* 7.7 8.4  --  7.8 8.7   HGB 8.7* 8.5* 8.3* 6.5* 11.8 12.2   < > 10.5* 12.2   MCV  --   --   --  81 109* 106*  --  98 98   PLT  --   --   --  113* 261 306  --  182 225   INR  --   --   --  1.97*  --   --   --  1.19* 1.13    < > = values in this interval not displayed.     Recent Labs   Lab Test 24  0734 24  1303     --   --  129* 124*   POTASSIUM 2.6* 2.9*  --  2.9* 2.8*   CHLORIDE 98  --   --  92* 79*   CO2 24  --   --  23 12*   BUN 29.3*  --   --  48.7* 53.9*   CR 0.78  --   --  0.95 0.97*   ANIONGAP 13  --   --  14 33*   JUSTICE 7.5*  --   --  7.3* 8.3*   *  --  174* 189* 187*     Recent Labs   Lab Test 24  0734 24  1303 03/15/23  0718 23  0615 23  1641 02/15/23  0641 23  0714 23  1554   ALBUMIN 3.4*  --  3.6 2.4*   < > 2.7* 2.0*   < >  --    BILITOTAL 1.3*  --  1.7* 1.5*   < > 1.8* 7.5*   < >  --    *  --  500* 31   < > 40 50*   < >  --    AST 1,634*  --  896* 81*   < > 102* 87*   < >  --     PROTEIN  --  20*  --   --   --  Negative  --   --  Negative   LIPASE  --   --  210*  --   --  157* 27  --   --     < > = values in this interval not displayed.        IMPRESSION/Plan:  Cirrhosis associated with alcohol use disorder with ongoing drinking and portal hypertension, meld of 16.  Varices seen on imaging  Asterixis, would add lactulose when able to take p.o.  Upper GI bleeding with hematemesis and hemoglobin of 6.5, now stable without signs of further active bleeding.  Possible varices.  On PPI and octreotide.  Awaiting EGD when electrolytes are stabilized  Agree with IV antibiotics for high risk of infection with GI bleeding and cirrhosis  Alcohol induced hepatitis, mild with bilirubin of 1.3  Electrolyte abnormalities, potassium of 2.6 still    25 minutes of total time was spent providing patient care, including patient evaluation, reviewing documentation/test results, coordination of care with other providers, and .    Boom Wilkinson  Cell [phone]  After 5 PM, please call [phone]

## 2024-04-08 NOTE — PHARMACY-ADMISSION MEDICATION HISTORY
Admission medication history completed at [facility]. Please see Pharmacy Intern Admission Medication History note from 4/7/2024.

## 2024-04-08 NOTE — PROGRESS NOTES
"CLINICAL NUTRITION SERVICES - ASSESSMENT NOTE     Nutrition Prescription    RECOMMENDATIONS FOR MDs/PROVIDERS TO ORDER:      Malnutrition Status:    Severe in acute    Recommendations already ordered by Registered Dietitian (RD):  None at this time    Future/Additional Recommendations:  Monitor need for diet education and supplements when patient starts eating.      REASON FOR ASSESSMENT  Tere Hoyt is a/an 43 year old female assessed by the dietitian for Admission Nutrition Risk Screen for \"unsure\" wt loss.    NUTRITION HISTORY  Patient admitted with hematemesis.  History of alcohol abuse, cirrhosis, alcohol related seizure.     EGD planned pending electrolyte replacement.   Patient reports decreased po intake since 3/22/24 due to started drinking again and patient wanting to lose weight.  She was not eating food just drinking beverages:  coffee, diet soda, water, alcohol.  Patient would like ice chips if able as her mouth is dry.    CURRENT NUTRITION ORDERS  Diet: NPO  Dextrose infusion changed to NS.    LABS  CMP  Recent Labs   Lab 04/08/24  0734 04/07/24  2237 04/07/24  2115 04/07/24  1837 04/07/24  1303     --   --  129* 124*   POTASSIUM 2.6* 2.9*  --  2.9* 2.8*   *  --  174* 189* 187*   BUN 29.3*  --   --  48.7* 53.9*   CR 0.78  --   --  0.95 0.97*   JUSTICE 7.5*  --   --  7.3* 8.3*   MAG 2.2 2.1  --   --  2.6*   PHOS 0.9* 0.7*  --   --   --    ALBUMIN 3.4*  --   --   --  3.6   BILITOTAL 1.3*  --   --   --  1.7*   ALKPHOS 68  --   --   --  67   AST 1,634*  --   --   --  896*   *  --   --   --  500*   Potassium and phosphorus replacement in process.  Low electrolytes may reflect refeeding syndrome.  Labs reviewed    MEDICATIONS  Current Facility-Administered Medications   Medication Dose Route Frequency Provider Last Rate Last Admin    cefTRIAXone (ROCEPHIN) 1 g vial to attach to  mL bag for ADULTS or NS 50 mL bag for PEDS  1 g Intravenous Q24H Leia Beltran MD   1 g at " 04/08/24 1311    folic acid injection 1 mg  1 mg Intravenous Daily Marques Mustafa DO   1 mg at 04/08/24 1054    insulin aspart (NovoLOG) injection (RAPID ACTING)  1-7 Units Subcutaneous Q4H [facility] Marques Mustafa DO        lactulose (CHRONULAC) solution 20 g  20 g Oral BID Boom Wilkinson MD   20 g at 04/08/24 1103    [Held by provider] multivitamin w/minerals (THERA-VIT-M) tablet 1 tablet  1 tablet Oral Daily Leia Beltran MD        pantoprazole (PROTONIX) IV push injection 40 mg  40 mg Intravenous BID Leia Beltran MD   40 mg at 04/08/24 0903    potassium chloride 10 mEq in 100 mL sterile water infusion  10 mEq Intravenous Q1H Marques Mustafa  mL/hr at 04/08/24 1314 10 mEq at 04/08/24 1314    sodium chloride (PF) 0.9% PF flush 3 mL  3 mL Intracatheter Q8H Edel Haney MD        sodium phosphate 15 mmol in sodium chloride 0.9 % 250 mL intermittent infusion  15 mmol Intravenous Q2H Marques Mustafa DO   15 mmol at 04/08/24 1214    thiamine (B-1) injection 100 mg  100 mg Intravenous Daily Marques Mustafa DO   100 mg at 04/08/24 1053      Current Facility-Administered Medications   Medication Dose Route Frequency Provider Last Rate Last Admin    0.9% sodium chloride + KCl 20 mEq/L infusion   Intravenous Continuous Marques Mustafa DO        octreotide (sandoSTATIN) 1,250 mcg in D5W 250 mL  50 mcg/hr Intravenous Continuous Leia Beltran MD 10 mL/hr at 04/08/24 0000 50 mcg/hr at 04/08/24 0000      Current Facility-Administered Medications   Medication Dose Route Frequency Provider Last Rate Last Admin    [Held by provider] calcium carbonate (TUMS) chewable tablet 1,000 mg  1,000 mg Oral 4x Daily PRN Leia Beltran MD        glucose gel 15-30 g  15-30 g Oral Q15 Min PRN Marques Mustafa DO        Or    dextrose 50 % injection 25-50 mL  25-50 mL Intravenous Q15 Min PRN Marques Mustafa DO        Or    glucagon injection 1 mg  1 mg  Subcutaneous Q15 Min PRN Marques Mustafa DO        flumazenil (ROMAZICON) injection 0.2 mg  0.2 mg Intravenous q1 min prn Leia Beltran MD        OLANZapine zydis (zyPREXA) ODT tab 5-10 mg  5-10 mg Oral Q6H PRN Leia Beltran MD        Or    haloperidol lactate (HALDOL) injection 2.5-5 mg  2.5-5 mg Intravenous Q6H PRN Leia Beltran MD        HYDROmorphone (DILAUDID) injection 0.2 mg  0.2 mg Intravenous Q3H PRN Leia Beltran MD        HYDROmorphone (DILAUDID) injection 0.4 mg  0.4 mg Intravenous Q3H PRN Leia Beltran MD        lidocaine (LMX4) cream   Topical Q1H PRN Edel Haney MD        lidocaine 1 % 0.1-1 mL  0.1-1 mL Other Q1H PRN Edel Haney MD        LORazepam (ATIVAN) tablet 1-2 mg  1-2 mg Oral Q30 Min PRN Leia Beltran MD        Or    LORazepam (ATIVAN) injection 1-2 mg  1-2 mg Intravenous Q30 Min PRN Leia Beltran MD   1 mg at 04/08/24 1211    [Held by provider] melatonin tablet 5 mg  5 mg Oral QPM PRN Leia Beltran MD        naloxone (NARCAN) injection 0.2 mg  0.2 mg Intravenous Q2 Min PRN Leia Beltran MD        Or    naloxone (NARCAN) injection 0.4 mg  0.4 mg Intravenous Q2 Min PRN Leia Beltran MD        Or    naloxone (NARCAN) injection 0.2 mg  0.2 mg Intramuscular Q2 Min PRN Leia Beltran MD        Or    naloxone (NARCAN) injection 0.4 mg  0.4 mg Intramuscular Q2 Min PRN Leia Beltran MD        [Held by provider] polyethylene glycol (MIRALAX) Packet 17 g  17 g Oral Daily PRN Leia Beltran MD        prochlorperazine (COMPAZINE) injection 10 mg  10 mg Intravenous Q6H PRN Leia Beltran MD        Or    prochlorperazine (COMPAZINE) suppository 25 mg  25 mg Rectal Q12H PRN Leia Beltran MD        [Held by provider] senna-docusate (SENOKOT-S/PERICOLACE) 8.6-50 MG per tablet 1 tablet  1 tablet Oral BID PRN Leia Beltran MD        Or    [Held by provider]  senna-docusate (SENOKOT-S/PERICOLACE) 8.6-50 MG per tablet 2 tablet  2 tablet Oral BID PRN Leia Beltran MD        sodium chloride (PF) 0.9% PF flush 3 mL  3 mL Intracatheter q1 min prn Marques Mustafa DO        sodium chloride (PF) 0.9% PF flush 3 mL  3 mL Intracatheter q1 min prn Edel Haney MD        sodium chloride (PF) 0.9% PF flush 3 mL  3 mL Intracatheter q1 min prn Leia Beltran MD          Medications reviewed    ANTHROPOMETRICS  Height: 0 cm (Data Unavailable)  Most Recent Weight: 87.3 kg (192 lb 8 oz)    BMI: Obesity Grade II BMI 35-39.9  Weight History:   04/07/24 : 87.3 kg (192 lb 8 oz)   04/07/24 : 81.6 kg (180 lb) - [facility].  12/18/23 : 93.9 kg (207 lb)   12/04/23 : 93.5 kg (206 lb 2 oz)   11/10/23 : 91.6 kg (202 lb)   04/07/23 : 74.5 kg (164 lb 4 oz)   03/13/23 : 81.6 kg (180 lb)   02/15/23 : 82.5 kg (181 lb 12.8 oz)   7.2% loss in 4 months.    Dosing Weight: 87.3 kg    ASSESSED NUTRITION NEEDS  Estimated Energy Needs: 1745+ kcals/day (20+ kcals/kg)  Justification: Obese and Repletion  Estimated Protein Needs: 70-87 grams protein/day (0.8 - 1 grams of pro/kg)  Justification: elevated LFT.  Estimated Fluid Needs: 1745+ mL/day (1 mL/kcal)   Justification: Maintenance    PHYSICAL FINDINGS  See malnutrition section below.  Last BM preadmit.    MALNUTRITION:  % Weight Loss:  Weight loss does not meet criteria for malnutrition 7.2% loss in 4 months.  % Intake:  </= 50% for >/= 5 days (severe malnutrition)  Subcutaneous Fat Loss:  None observed  Muscle Loss:  None observed  Fluid Retention:  None noted   strength-reduced with handshake when writer asked patient to give a strong  x2.       Malnutrition Diagnosis: Severe malnutrition  In Context of:  Acute illness or injury    NUTRITION DIAGNOSIS  Malnutrition related to acute illness as evidenced by decreased intake and decreased muscle .      INTERVENTIONS  Implementation  EGD planned pending electrolyte replacement.   Patient Remains NPO until EGD complete.   Nutrition Education: Not appropriate at this time due to patient condition -patient would benefit from diet ed on healthy meals before discharge.  Monitor need for supplements after diet advancement.  Encourage protein intake when able to eat.     Goals  Electrolytes WNL  Diet advancement vs nutrition support within 2-3 days.  Meet nutrition needs with good po intake at meals.     Monitoring/Evaluation  Progress toward goals will be monitored and evaluated per protocol.

## 2024-04-08 NOTE — CONSULTS
Care Management Initial Consult    General Information  Assessment completed with: Patient,    Type of CM/SW Visit: Initial Assessment    Primary Care Provider verified and updated as needed:     Readmission within the last 30 days: no previous admission in last 30 days         Advance Care Planning:            Communication Assessment  Patient's communication style: spoken language (English or Bilingual)    Hearing Difficulty or Deaf: no   Wear Glasses or Blind: no    Cognitive  Cognitive/Neuro/Behavioral: .WDL except  Level of Consciousness: lethargic  Arousal Level: opens eyes spontaneously     Mood/Behavior: flat affect, anxious  Best Language: 0 - No aphasia  Speech: illogical (at times)    Living Environment:   People in home: child(nico), dependent, parent(s)     Current living Arrangements: house      Able to return to prior arrangements: yes       Family/Social Support:  Care provided by: self  Provides care for: child(nico)  Marital Status:   Parent(s)          Description of Support System: Supportive, Involved    Support Assessment: Adequate family and caregiver support, Adequate social supports    Current Resources:   Patient receiving home care services: No     Community Resources: None  Equipment currently used at home: none  Supplies currently used at home: None    Employment/Financial:  Employment Status: employed full-time        Financial Concerns:            Lifestyle & Psychosocial Needs:  Social Determinants of Health     Food Insecurity: Not on file   Depression: Not on file   Housing Stability: Not on file   Tobacco Use: Medium Risk (4/7/2024)    Patient History     Smoking Tobacco Use: Former     Smokeless Tobacco Use: Never     Passive Exposure: Not on file   Financial Resource Strain: Not on file   Alcohol Use: Not on file   Transportation Needs: Not on file   Physical Activity: Not on file   Interpersonal Safety: Not on file   Stress: Not on file   Social Connections: Not on file  "      Functional Status:  Prior to admission patient needed assistance:   Dependent ADLs:: Independent  Dependent IADLs:: Independent       Mental Health Status:  Mental Health Status: Past Concern       Chemical Dependency Status:  Chemical Dependency Status: Current Concern  Chemical Dependency Management: Previous treatment          Values/Beliefs:  Spiritual, Cultural Beliefs, [employer] Practices, Values that affect care:                 Additional Information:  SW met with pt for initial assessment. Pt does note that she is not feeling the best and on assessment pt with some slurring of words and difficulty recalling information. SW completed initial assessment, but will plan to re-visit with pt when she is feeling better for further assessment. Pt open to meeting with SW today. She reports she lives with her 10 year old son and that her dad stays with them as well. She reports that her mom lives out of the area but is here visiting currently. Pt reports that she owns the home they live in and that she is normally independent with all I/ADLs, drives and works full-time.     Pt uncertain if she has a PCP. SW asked about her recent relapse indicated in the notes that pt had a lengthy period of sobriety before relapsing in the last two weeks. Pt confirms this. She is unable to recall when her \"sobriety journey\" started but reports that she went to [facility] for treatment a couple of times and also did an IOP program. SW asked pt what she feels she needs at this time and pt reports to SW that she would like to talk with ROBBY further when she is feeling better about options for seeking different employment and getting any financial help. She reports that \"her 10 year old's father\" passed away a year ago in February and that has been very hard, she then referred to herself as a  as well. ROBBY provided supportive listening and discussed plan to return with some resources when pt feeling better. Pt agreeable. SW did " not engage in further conversation about chemical dependency  treatment at this time, but anticipate pt will be seen by CD counselor as IP CD consult pending.     SW will follow and re-visit with pt when appropriate.    ELSA Bloom

## 2024-04-08 NOTE — OR NURSING
Spoke with Dr Rivera, Anesthesiologist, regarding latest potassium level 2.6. Parameters for EGD today as follows:   * Must be emergent  * Hold until potassium replacement complete and level rechecked and reported to Anesthesia    Above information et instructions relayed to:   * DELMY Charge    - Relayed to OR Control Desk  * ICU Patient Care RN  * GI Lab RN    ICU Patient Care RN relayed that patient is currently in the process of receiving 6, 10 mEq KCl Potassium replacement doses. These should be complete with recheck ~ [id number] today.     Debbie Leija, RN

## 2024-04-08 NOTE — PROGRESS NOTES
[facility] - ICU    RN Progress Note:            Pertinent Assessments:      Please refer to flowsheet rows for full assessment     GI, Neuro, CIWA, I&O           Key Events - This Shift:   Potassium and phosphorus critically low this morning. Replacements given via IV. Anesthesia delayed EGD until repeat labs drawn and levels are WNL. Tentative plan for EGD in the OR tomorrow at 11:30. No hematemesis, no stools. Adequate urine output via purewick.Remains on Ocetreotide infusion as well as IVF. CIWA scores treated when patient not somnolent.              Barriers to Discharge / Downgrade:     Needs EGD, stabilization of electrolytes and hemoglobin.           Makayla Moreno, RN

## 2024-04-08 NOTE — CONSULTS
4/8/2024  KARMEN Consult acknowledged.  Staff will attempt to see pt for KARMEN Consult tomorrow, 4/9/24, due to pt not feeling well at this time.  If pt discharges prior to consult, they can call [place] at [phone] to schedule an OP KARMEN Assessment.    Hannah Jolley MA [facility]  KARMEN Evaluation Counselor  [phone]  [email or web address]

## 2024-04-08 NOTE — H&P
[facility]    History and Physical - Hospitalist Service       Date of Admission:  4/7/2024    Assessment & Plan   Tere Hoyt is a 43 year old female with history of alcohol abuse, alcoholic cirrhosis, alcohol related seizure, HSV-2, depression, and tobacco use disorder admitted on 4/7/2024 as a transfer from [facility] with hematemesis possibly secondary to variceal bleed.    Acute blood loss anemia secondary to GI bleed  Hematemesis  Suspected variceal bleed  CT scan revealed cirrhosis with manifestations of portal hypertension, mild fluid-filled distention of the stomach and IUD is in place.   She was transfused with 2 units of PRBC prior to transfer     Continue octreotide and IV pantoprazole  N.p.o. for now  Continue IV fluid  Monitor hemoglobin every 6 hours for now and transfuse if hemoglobin drops below 7  Possible EGD tomorrow per GI team  Urgent GI evaluation if patient becomes hemodynamically unstable or develops desi hematemesis  Follow-up GI consult  Outpatient follow-up at the hepatology clinic    Decompensated cirrhosis  Suspected alcoholic hepatitis  , , total bilirubin 1.7, bilirubin 0.95    Continue ceftriaxone 1 g daily  Abstinence from alcohol consumption  Monitor LFTs  Outpatient follow-up at the hepatology clinic  Follow-up GI consult      Suspected alcohol induced acute pancreatitis  Elevated lipase  Lipase was elevated at 210  CT did not show evidence of pancreatitis  IV fluid and analgesics for now  Antiemetics as needed  Abstinence from alcohol consumption      Alcohol abuse  Patient was previously sober for about 11 months but started drinking alcohol again about 1-2 weeks ago.   No evidence of alcohol withdrawal at this time  Follow-up chemical dependency consult  Monitor possible protocol  Benzodiazepine as needed      Hyponatremia  Possibly secondary to poor fluid intake and fluid depletion in the setting of chronic liver  "disease    IV fluid as above  Follow-up urine sodium, urine osmolality and serum osmolality  Monitor sodium level with goal of 6 mEq increment per day.       Hypokalemia  Monitor potassium and replace as needed    Thrombocytopenia  Possibly secondary to portal hypertension  Monitor platelet count          Diet: NPO per Anesthesia Guidelines for Procedure/Surgery Except for: Meds  DVT Prophylaxis: Pneumatic Compression Devices  Fernando Catheter: Not present  Lines: None     Cardiac Monitoring: ACTIVE order. Indication: Severe acute blood loss anemia secondary to GI bleed.  Code Status: Full Code    Clinically Significant Risk Factors Present on Admission        # Hypokalemia: Lowest K = 2.8 mmol/L in last 2 days, will replace as needed  # Hyponatremia: Lowest Na = 124 mmol/L in last 2 days, will monitor as appropriate  # Hypocalcemia: Lowest Ca = 7.3 mg/dL in last 2 days, will monitor and replace as appropriate    # Anion Gap Metabolic Acidosis: Highest Anion Gap = 33 mmol/L in last 2 days, will monitor and treat as appropriate     # Coagulation Defect: INR = 1.97 (Ref range: 0.85 - 1.15) and/or PTT = 20 Seconds (Ref range: 22 - 38 Seconds), will monitor for bleeding  # Thrombocytopenia: Lowest platelets = 113 in last 2 days, will monitor for bleeding        # Obesity: Estimated body mass index is 35.21 kg/m  as calculated from the following:    Height as of an earlier encounter on 4/7/24: 1.575 m (5' 2\").    Weight as of this encounter: 87.3 kg (192 lb 8 oz).              Disposition Plan      Expected Discharge Date: 04/09/2024                  Leia Beltran MD  Hospitalist Service  [facility]  Securely message with [employer] (more info)  Text page via [facility] Paging/Directory     ______________________________________________________________________    Chief Complaint   Hematemesis    History is obtained from the patient    History of Present Illness   Tere Hoyt is a 43 year old " female with history of alcohol abuse, alcoholic cirrhosis, alcohol related seizure, HSV-2, depression, and tobacco use disorder who presents as a transfer from [facility] with hematemesis.    She was in her usual state of health until 2 days ago when she developed hematemesis. Patient was previously sober for about 11 months but started drinking alcohol again about 1-2 weeks ago.  She experienced dizziness and shortness of breath with activity prior to transfusion however, symptoms resolved posttransfusion.    Notable laboratory findings include sodium 124, potassium 2.8, chloride 79, bicarbonate 12, BUN 53.9, calcium 8.3, anion gap 33, magnesium 2.6, , , total bilirubin 1.7, bilirubin 0.95, WBC 11.9, hemoglobin 6.5, platelet 113, INR 1.97 and lipase 210.  Alcohol level was negative.    She was transfused with 2 units of PRBC prior to transfer. She also received ceftriaxone, lorazepam, octreotide, Zofran, pantoprazole, vitamin K, potassium supplement, and normal saline prior to transfer.  Per ER attending, patient was not admitted at [facility] due to possibility of needing IR intervention for GI bleed. ER attending discussed with gastroenterologist who plans to perform EGD tomorrow. CT scan revealed cirrhosis with manifestations of portal hypertension, mild fluid-filled distention of the stomach and IUD is in place.  She was to be full code.      Past Medical History    No past medical history on file.    Past Surgical History   No past surgical history on file.    Prior to Admission Medications   Prior to Admission Medications   Prescriptions Last Dose Informant Patient Reported? Taking?   acetaminophen (TYLENOL) 500 MG tablet   Yes No   Sig: Take 500-1,000 mg by mouth every 6 hours as needed for pain   melatonin 5 MG tablet   Yes No   Sig: Take 5 mg by mouth nightly as needed for sleep   triamcinolone (KENALOG) 0.1 % external ointment   Yes No   Sig: Apply topically 2 times daily as  needed (Eczema)      Facility-Administered Medications: None        Review of Systems    The 10 point Review of Systems is negative other than noted in the HPI or here.     Physical Exam   Vital Signs: Temp: 98.8  F (37.1  C) Temp src: Oral BP: 107/64 Pulse: 110   Resp: 18 SpO2: 100 % O2 Device: None (Room air)    Weight: 192 lbs 8 oz    General appearance: Awake, Alert, Cooperative, not in any obvious distress and appears stated age   HEENT: Normocephalic, atraumatic, conjunctiva clear without icterus and ears without discharge  Lungs: Clear to auscultation bilaterally, no wheezing, good air exchange, normal work of breathing  Cardiovascular: Regular Rate and Rythm, normal apical impulse, normal S1 and S2, no lower extremity edema bilaterally  Abdomen: Soft, non-tender and Non-distended, active bowel sounds  Skin: Skin color, texture normal and bruising or bleeding. No rashes or lesions over face, neck, arms and legs, turgor normal.  Musculoskeletal: No bony deformities or joint tenderness. Normal ROM upon flexion & extension.   Neurologic: Alert & Oriented X 3, Facial symmetry preserved and upper & lower extremities moving well with symmetry  Psychiatric: Calm, normal eye contact and normal affect      Medical Decision Making       75 MINUTES SPENT BY ME on the date of service doing chart review, history, exam, documentation & further activities per the note.      Data     I have personally reviewed the following data over the past 24 hrs:    11.9 (H)  \   8.3 (L)   / 113 (L)     129 (L) 92 (L) 48.7 (H) /  174 (H)   2.9 (L) 23 0.95 \     ALT: 500 (H) AST: 896 (HH) AP: 67 TBILI: 1.7 (H)   ALB: 3.6 TOT PROTEIN: 5.9 (L) LIPASE: 210 (H)     INR:  1.97 (H) PTT:  20 (L)   D-dimer:  N/A Fibrinogen:  N/A       Imaging results reviewed over the past 24 hrs:   Recent Results (from the past 24 hour(s))   CT Abdomen Pelvis w Contrast    Narrative    EXAM: CT ABDOMEN PELVIS W CONTRAST  LOCATION: [facility]  HOSPITAL  DATE: 4/7/2024    INDICATION: abdominal pain, vomiting, etoh abuse cirrhosis  COMPARISON: 03/13/2023, hepatobiliary MRI from 11/21/2023  TECHNIQUE: CT scan of the abdomen and pelvis was performed following injection of IV contrast. Multiplanar reformats were obtained. Dose reduction techniques were used.  CONTRAST: Isovue  370 90mL    FINDINGS:   LOWER CHEST: Normal.    HEPATOBILIARY: Severe hepatic steatosis. There is mild contour nodularity. Small area of fibrosis along the falciform ligament on series 2 image 17, similar to prior exam. No biliary duct dilation.    PANCREAS: Normal.    SPLEEN: Normal.    ADRENAL GLANDS: Benign calcifications in the otherwise normal left adrenal gland. This is of no clinical significance.    KIDNEYS/BLADDER: No significant mass, stone, or hydronephrosis.    BOWEL: No obstruction or inflammatory change. Normal appendix. Mild fluid-filled distention of the stomach.    LYMPH NODES: Normal.    VASCULATURE: There are numerous upper abdominal varices in the paraesophageal and gastroesophageal region. Some of these are submucosal in distribution particularly in the fundus of the stomach. No abdominal aortic aneurysm.    PELVIC ORGANS: IUD is in place.    MUSCULOSKELETAL: Normal.      Impression    IMPRESSION:   1.  Cirrhosis with manifestations of portal hypertension. No suspicious hepatic observations.  2.  Mild fluid-filled distention of the stomach.  3.  No obstruction.  4.  IUD is in place.

## 2024-04-08 NOTE — PROGRESS NOTES
[facility]    Medicine Progress Note - Hospitalist Service    Date of Admission:  4/7/2024    Assessment & Plan     Tere Hoyt is a 43 year old female with history of alcohol abuse, alcoholic cirrhosis, alcohol related seizure, HSV-2, depression, and tobacco use disorder admitted on 4/7/2024 as a transfer from [facility] with hematemesis possibly secondary to variceal bleed.     Acute blood loss anemia secondary to GI bleed  UGIB, Possible variceal bleed  Probable portal hypertensive gastropathy/gastritis  PUD not excluded  Coagulopathy due to cirrhosis  -CT scan cirrhosis, portal hypertension, paraesophageal and gastroesophageal varices  -Hemodynamically stable w/o recurrent hematemesis  -Continue octreotide and IV pantoprazole  -N.p.o.   -Continue IV fluid  -Monitor hemoglobin every 6 hours for now and transfuse if hemoglobin drops below -EGD when able  -GI following  -Bcx2  -empiric IV CTX  --18g PIV x2     #Decompensated alcoholic cirrhosis, portal HTN, Thrombocytopenia, Coagulopathy, paraesophageal and gastroesophageal varices, hyperammonemic encephalopathy  #Alcoholic hepatitis  -Continue ceftriaxone 1 g daily  -BC's x2  -18g PIV x2  -EGD when cleared unless because emergent  -CK mild elevation  -check tylenol level, INR  -IV PPI q12  -consider hepatic duplex US if LFT's remain elevated    Possible Rhabdomyolysis  -CK elevated 100  -IVF  -CK a.m.    Acute toxic/metabolic encephalopathy:  -multi-factorial  -lactulose when able to swallow. If worsens may need enema  -CT head     Possible alcohol induced acute pancreatitis  Elevated lipase  -Lipase was elevated at 210  -CT did not show evidence of pancreatitis  -IV fluid   -GI following     Chronic alcohol abuse  -Patient was previously sober for about 11 months but started drinking alcohol again about 1-2 weeks ago.   -No evidence of alcohol withdrawal at this time  -chemical dependency consult when able  -WA protocol,  "thiamine, folic acid     Hyponatremia  Possibly secondary to poor fluid intake and fluid depletion in the setting of chronic liver disease  IV fluid as above  monitor     Hypokalemia, Hypophosphatemia, Hypomagnesemia  Monitor and replace per protocol     Thrombocytopenia  Due to cirrhosis, blood loss, sepsis less likely but high risk so not excluded as contributory  Monitor platelet count           Diet: NPO for Medical/Clinical Reasons Except for: No Exceptions    DVT Prophylaxis: Pneumatic Compression Devices  Fernando Catheter: Not present  Lines: None     Cardiac Monitoring: ACTIVE order. Indication: Severe acute blood loss anemia secondary to GI bleed.  Code Status: Full Code      Clinically Significant Risk Factors Present on Admission        # Hypokalemia: Lowest K = 2.6 mmol/L in last 2 days, will replace as needed  # Hyponatremia: Lowest Na = 124 mmol/L in last 2 days, will monitor as appropriate  # Hypocalcemia: Lowest Ca = 7.3 mg/dL in last 2 days, will monitor and replace as appropriate    # Anion Gap Metabolic Acidosis: Highest Anion Gap = 33 mmol/L in last 2 days, will monitor and treat as appropriate  # Hypoalbuminemia: Lowest albumin = 3.4 g/dL at 4/8/2024  7:34 AM, will monitor as appropriate  # Coagulation Defect: INR = 1.56 (Ref range: 0.85 - 1.15) and/or PTT = 20 Seconds (Ref range: 22 - 38 Seconds), will monitor for bleeding  # Thrombocytopenia: Lowest platelets = 113 in last 2 days, will monitor for bleeding        # Obesity: Estimated body mass index is 35.21 kg/m  as calculated from the following:    Height as of an earlier encounter on 4/7/24: 1.575 m (5' 2\").    Weight as of this encounter: 87.3 kg (192 lb 8 oz).              Disposition Plan      Expected Discharge Date: 04/09/2024      Destination: home;home with family              Marques Ron Mustafa DO, DO  [facility]  Securely message with [employer] (more info)  Text page via [email or web address] " Paging/Directory   ______________________________________________________________________    Interval History   Afebrile.  Events overnight noted    Physical Exam   Vital Signs: Temp: 99.6  F (37.6  C) Temp src: Axillary BP: 116/84 Pulse: 107   Resp: 15 SpO2: 99 % O2 Device: None (Room air)    Weight: 192 lbs 8 oz    Physical Examination:   General appearance - ill appearing but no acute distress, not toxic  Eyes - pupils equal and reactive, sclera anicteric  Mouth - mucous membranes pasty, pharynx normal without lesions  Lungs - clear to auscultation anteriorly, non labored  Heart - normal rate, regular rhythm, normal S1, S2, no murmurs, rubs, clicks or gallops. No peripheral edema.  Abdomen - soft, nontender, nondistended, BS+  Neurological - alert, oriented x 3 although slow response with speech, slight asterixis, followed simple commands, some confusion  Skin - no c/c/p    Lab/imaging reviewed

## 2024-04-09 ENCOUNTER — ANESTHESIA (OUTPATIENT)
Dept: SURGERY | Facility: HOSPITAL | Age: 44
DRG: 377 | End: 2024-04-09
Payer: COMMERCIAL

## 2024-04-09 ENCOUNTER — ANESTHESIA EVENT (OUTPATIENT)
Dept: SURGERY | Facility: HOSPITAL | Age: 44
DRG: 377 | End: 2024-04-09
Payer: COMMERCIAL

## 2024-04-09 LAB
ALBUMIN SERPL BCG-MCNC: 2.9 G/DL (ref 3.5–5.2)
ALP SERPL-CCNC: 63 U/L (ref 40–150)
ALT SERPL W P-5'-P-CCNC: 1237 U/L (ref 0–50)
ANION GAP SERPL CALCULATED.3IONS-SCNC: 12 MMOL/L (ref 7–15)
AST SERPL W P-5'-P-CCNC: 2043 U/L (ref 0–45)
BASOPHILS # BLD AUTO: 0 10E3/UL (ref 0–0.2)
BASOPHILS NFR BLD AUTO: 0 %
BILIRUB DIRECT SERPL-MCNC: 0.58 MG/DL (ref 0–0.3)
BILIRUB SERPL-MCNC: 0.9 MG/DL
BUN SERPL-MCNC: 10.5 MG/DL (ref 6–20)
CALCIUM SERPL-MCNC: 7.1 MG/DL (ref 8.6–10)
CHLORIDE SERPL-SCNC: 104 MMOL/L (ref 98–107)
CK SERPL-CCNC: 822 U/L (ref 26–192)
CREAT SERPL-MCNC: 0.6 MG/DL (ref 0.51–0.95)
DEPRECATED HCO3 PLAS-SCNC: 25 MMOL/L (ref 22–29)
EGFRCR SERPLBLD CKD-EPI 2021: >90 ML/MIN/1.73M2
EOSINOPHIL # BLD AUTO: 0.1 10E3/UL (ref 0–0.7)
EOSINOPHIL NFR BLD AUTO: 2 %
ERYTHROCYTE [DISTWIDTH] IN BLOOD BY AUTOMATED COUNT: 14.6 % (ref 10–15)
GLUCOSE BLDC GLUCOMTR-MCNC: 102 MG/DL (ref 70–99)
GLUCOSE BLDC GLUCOMTR-MCNC: 108 MG/DL (ref 70–99)
GLUCOSE BLDC GLUCOMTR-MCNC: 110 MG/DL (ref 70–99)
GLUCOSE BLDC GLUCOMTR-MCNC: 110 MG/DL (ref 70–99)
GLUCOSE BLDC GLUCOMTR-MCNC: 120 MG/DL (ref 70–99)
GLUCOSE BLDC GLUCOMTR-MCNC: 124 MG/DL (ref 70–99)
GLUCOSE SERPL-MCNC: 107 MG/DL (ref 70–99)
HAV IGM SERPL QL IA: NONREACTIVE
HBV CORE IGM SERPL QL IA: NONREACTIVE
HBV SURFACE AG SERPL QL IA: NONREACTIVE
HCT VFR BLD AUTO: 22.1 % (ref 35–47)
HCV AB SERPL QL IA: NONREACTIVE
HGB BLD-MCNC: 7.5 G/DL (ref 11.7–15.7)
HGB BLD-MCNC: 7.8 G/DL (ref 11.7–15.7)
IMM GRANULOCYTES # BLD: 0.1 10E3/UL
IMM GRANULOCYTES NFR BLD: 2 %
LYMPHOCYTES # BLD AUTO: 1.9 10E3/UL (ref 0.8–5.3)
LYMPHOCYTES NFR BLD AUTO: 35 %
MAGNESIUM SERPL-MCNC: 1.6 MG/DL (ref 1.7–2.3)
MCH RBC QN AUTO: 28.2 PG (ref 26.5–33)
MCHC RBC AUTO-ENTMCNC: 35.3 G/DL (ref 31.5–36.5)
MCV RBC AUTO: 80 FL (ref 78–100)
MONOCYTES # BLD AUTO: 0.5 10E3/UL (ref 0–1.3)
MONOCYTES NFR BLD AUTO: 9 %
NEUTROPHILS # BLD AUTO: 2.9 10E3/UL (ref 1.6–8.3)
NEUTROPHILS NFR BLD AUTO: 52 %
NRBC # BLD AUTO: 0.2 10E3/UL
NRBC BLD AUTO-RTO: 4 /100
PHOSPHATE SERPL-MCNC: 1.7 MG/DL (ref 2.5–4.5)
PHOSPHATE SERPL-MCNC: 1.8 MG/DL (ref 2.5–4.5)
PHOSPHATE SERPL-MCNC: 1.9 MG/DL (ref 2.5–4.5)
PLATELET # BLD AUTO: 75 10E3/UL (ref 150–450)
POTASSIUM SERPL-SCNC: 2.6 MMOL/L (ref 3.4–5.3)
POTASSIUM SERPL-SCNC: 3.3 MMOL/L (ref 3.4–5.3)
POTASSIUM SERPL-SCNC: 3.9 MMOL/L (ref 3.4–5.3)
PROT SERPL-MCNC: 5.1 G/DL (ref 6.4–8.3)
RBC # BLD AUTO: 2.77 10E6/UL (ref 3.8–5.2)
SODIUM SERPL-SCNC: 141 MMOL/L (ref 135–145)
UPPER GI ENDOSCOPY: NORMAL
WBC # BLD AUTO: 5.5 10E3/UL (ref 4–11)

## 2024-04-09 PROCEDURE — 258N000003 HC RX IP 258 OP 636: Performed by: NURSE ANESTHETIST, CERTIFIED REGISTERED

## 2024-04-09 PROCEDURE — 250N000011 HC RX IP 250 OP 636: Performed by: INTERNAL MEDICINE

## 2024-04-09 PROCEDURE — 272N000001 HC OR GENERAL SUPPLY STERILE: Performed by: INTERNAL MEDICINE

## 2024-04-09 PROCEDURE — 84100 ASSAY OF PHOSPHORUS: CPT | Performed by: INTERNAL MEDICINE

## 2024-04-09 PROCEDURE — 360N000075 HC SURGERY LEVEL 2, PER MIN: Performed by: INTERNAL MEDICINE

## 2024-04-09 PROCEDURE — 0DB98ZX EXCISION OF DUODENUM, VIA NATURAL OR ARTIFICIAL OPENING ENDOSCOPIC, DIAGNOSTIC: ICD-10-PCS | Performed by: INTERNAL MEDICINE

## 2024-04-09 PROCEDURE — 84132 ASSAY OF SERUM POTASSIUM: CPT | Performed by: INTERNAL MEDICINE

## 2024-04-09 PROCEDURE — 0DB68ZX EXCISION OF STOMACH, VIA NATURAL OR ARTIFICIAL OPENING ENDOSCOPIC, DIAGNOSTIC: ICD-10-PCS | Performed by: INTERNAL MEDICINE

## 2024-04-09 PROCEDURE — 82550 ASSAY OF CK (CPK): CPT | Performed by: INTERNAL MEDICINE

## 2024-04-09 PROCEDURE — 36415 COLL VENOUS BLD VENIPUNCTURE: CPT | Performed by: INTERNAL MEDICINE

## 2024-04-09 PROCEDURE — C9113 INJ PANTOPRAZOLE SODIUM, VIA: HCPCS | Performed by: INTERNAL MEDICINE

## 2024-04-09 PROCEDURE — 250N000013 HC RX MED GY IP 250 OP 250 PS 637: Performed by: INTERNAL MEDICINE

## 2024-04-09 PROCEDURE — 200N000001 HC R&B ICU

## 2024-04-09 PROCEDURE — 370N000017 HC ANESTHESIA TECHNICAL FEE, PER MIN: Performed by: INTERNAL MEDICINE

## 2024-04-09 PROCEDURE — 99233 SBSQ HOSP IP/OBS HIGH 50: CPT | Performed by: INTERNAL MEDICINE

## 2024-04-09 PROCEDURE — 999N000141 HC STATISTIC PRE-PROCEDURE NURSING ASSESSMENT: Performed by: INTERNAL MEDICINE

## 2024-04-09 PROCEDURE — 88342 IMHCHEM/IMCYTCHM 1ST ANTB: CPT | Mod: 26 | Performed by: PATHOLOGY

## 2024-04-09 PROCEDURE — 88305 TISSUE EXAM BY PATHOLOGIST: CPT | Mod: 26 | Performed by: PATHOLOGY

## 2024-04-09 PROCEDURE — 82248 BILIRUBIN DIRECT: CPT | Performed by: INTERNAL MEDICINE

## 2024-04-09 PROCEDURE — 258N000003 HC RX IP 258 OP 636: Performed by: INTERNAL MEDICINE

## 2024-04-09 PROCEDURE — 85025 COMPLETE CBC W/AUTO DIFF WBC: CPT | Performed by: INTERNAL MEDICINE

## 2024-04-09 PROCEDURE — 80053 COMPREHEN METABOLIC PANEL: CPT | Performed by: INTERNAL MEDICINE

## 2024-04-09 PROCEDURE — 250N000011 HC RX IP 250 OP 636: Performed by: NURSE ANESTHETIST, CERTIFIED REGISTERED

## 2024-04-09 PROCEDURE — 85018 HEMOGLOBIN: CPT | Performed by: INTERNAL MEDICINE

## 2024-04-09 PROCEDURE — 88305 TISSUE EXAM BY PATHOLOGIST: CPT | Mod: TC | Performed by: INTERNAL MEDICINE

## 2024-04-09 PROCEDURE — 250N000009 HC RX 250: Performed by: INTERNAL MEDICINE

## 2024-04-09 PROCEDURE — 83735 ASSAY OF MAGNESIUM: CPT | Performed by: INTERNAL MEDICINE

## 2024-04-09 PROCEDURE — 258N000003 HC RX IP 258 OP 636: Performed by: STUDENT IN AN ORGANIZED HEALTH CARE EDUCATION/TRAINING PROGRAM

## 2024-04-09 RX ORDER — ONDANSETRON 2 MG/ML
4 INJECTION INTRAMUSCULAR; INTRAVENOUS EVERY 30 MIN PRN
Status: DISCONTINUED | OUTPATIENT
Start: 2024-04-09 | End: 2024-04-09 | Stop reason: HOSPADM

## 2024-04-09 RX ORDER — SODIUM CHLORIDE, SODIUM LACTATE, POTASSIUM CHLORIDE, CALCIUM CHLORIDE 600; 310; 30; 20 MG/100ML; MG/100ML; MG/100ML; MG/100ML
INJECTION, SOLUTION INTRAVENOUS CONTINUOUS PRN
Status: DISCONTINUED | OUTPATIENT
Start: 2024-04-09 | End: 2024-04-09

## 2024-04-09 RX ORDER — FENTANYL CITRATE 50 UG/ML
INJECTION, SOLUTION INTRAMUSCULAR; INTRAVENOUS PRN
Status: DISCONTINUED | OUTPATIENT
Start: 2024-04-09 | End: 2024-04-09

## 2024-04-09 RX ORDER — FLUMAZENIL 0.1 MG/ML
0.2 INJECTION, SOLUTION INTRAVENOUS
Status: DISCONTINUED | OUTPATIENT
Start: 2024-04-09 | End: 2024-04-10

## 2024-04-09 RX ORDER — HALOPERIDOL 5 MG/ML
1 INJECTION INTRAMUSCULAR
Status: DISCONTINUED | OUTPATIENT
Start: 2024-04-09 | End: 2024-04-09 | Stop reason: HOSPADM

## 2024-04-09 RX ORDER — NALOXONE HYDROCHLORIDE 0.4 MG/ML
0.1 INJECTION, SOLUTION INTRAMUSCULAR; INTRAVENOUS; SUBCUTANEOUS
Status: DISCONTINUED | OUTPATIENT
Start: 2024-04-09 | End: 2024-04-09 | Stop reason: HOSPADM

## 2024-04-09 RX ORDER — SODIUM CHLORIDE, SODIUM LACTATE, POTASSIUM CHLORIDE, CALCIUM CHLORIDE 600; 310; 30; 20 MG/100ML; MG/100ML; MG/100ML; MG/100ML
INJECTION, SOLUTION INTRAVENOUS CONTINUOUS
Status: DISCONTINUED | OUTPATIENT
Start: 2024-04-09 | End: 2024-04-10

## 2024-04-09 RX ORDER — PROPOFOL 10 MG/ML
INJECTION, EMULSION INTRAVENOUS CONTINUOUS PRN
Status: DISCONTINUED | OUTPATIENT
Start: 2024-04-09 | End: 2024-04-09

## 2024-04-09 RX ORDER — ONDANSETRON 4 MG/1
4 TABLET, ORALLY DISINTEGRATING ORAL EVERY 30 MIN PRN
Status: DISCONTINUED | OUTPATIENT
Start: 2024-04-09 | End: 2024-04-09 | Stop reason: HOSPADM

## 2024-04-09 RX ORDER — MAGNESIUM SULFATE HEPTAHYDRATE 40 MG/ML
2 INJECTION, SOLUTION INTRAVENOUS ONCE
Qty: 50 ML | Refills: 0 | Status: COMPLETED | OUTPATIENT
Start: 2024-04-09 | End: 2024-04-09

## 2024-04-09 RX ORDER — POTASSIUM CHLORIDE 1500 MG/1
40 TABLET, EXTENDED RELEASE ORAL ONCE
Status: COMPLETED | OUTPATIENT
Start: 2024-04-09 | End: 2024-04-09

## 2024-04-09 RX ORDER — LIDOCAINE 40 MG/G
CREAM TOPICAL
Status: DISCONTINUED | OUTPATIENT
Start: 2024-04-09 | End: 2024-04-11 | Stop reason: HOSPADM

## 2024-04-09 RX ORDER — POTASSIUM CHLORIDE 7.45 MG/ML
10 INJECTION INTRAVENOUS
Status: COMPLETED | OUTPATIENT
Start: 2024-04-09 | End: 2024-04-09

## 2024-04-09 RX ADMIN — POTASSIUM CHLORIDE AND SODIUM CHLORIDE: 900; 150 INJECTION, SOLUTION INTRAVENOUS at 07:48

## 2024-04-09 RX ADMIN — POTASSIUM CHLORIDE 10 MEQ: 7.46 INJECTION, SOLUTION INTRAVENOUS at 08:59

## 2024-04-09 RX ADMIN — SODIUM CHLORIDE, POTASSIUM CHLORIDE, SODIUM LACTATE AND CALCIUM CHLORIDE: 600; 310; 30; 20 INJECTION, SOLUTION INTRAVENOUS at 12:10

## 2024-04-09 RX ADMIN — MIDAZOLAM 2 MG: 1 INJECTION INTRAMUSCULAR; INTRAVENOUS at 12:08

## 2024-04-09 RX ADMIN — POTASSIUM & SODIUM PHOSPHATES POWDER PACK 280-160-250 MG 2 PACKET: 280-160-250 PACK at 17:57

## 2024-04-09 RX ADMIN — PANTOPRAZOLE SODIUM 40 MG: 40 INJECTION, POWDER, FOR SOLUTION INTRAVENOUS at 20:47

## 2024-04-09 RX ADMIN — POTASSIUM & SODIUM PHOSPHATES POWDER PACK 280-160-250 MG 2 PACKET: 280-160-250 PACK at 14:22

## 2024-04-09 RX ADMIN — POTASSIUM CHLORIDE 10 MEQ: 7.46 INJECTION, SOLUTION INTRAVENOUS at 07:48

## 2024-04-09 RX ADMIN — POTASSIUM CHLORIDE 10 MEQ: 7.46 INJECTION, SOLUTION INTRAVENOUS at 05:26

## 2024-04-09 RX ADMIN — FENTANYL CITRATE 50 MCG: 50 INJECTION INTRAMUSCULAR; INTRAVENOUS at 12:15

## 2024-04-09 RX ADMIN — SODIUM PHOSPHATE, MONOBASIC, MONOHYDRATE AND SODIUM PHOSPHATE, DIBASIC, ANHYDROUS 9 MMOL: 142; 276 INJECTION, SOLUTION INTRAVENOUS at 04:32

## 2024-04-09 RX ADMIN — SODIUM CHLORIDE, POTASSIUM CHLORIDE, SODIUM LACTATE AND CALCIUM CHLORIDE: 600; 310; 30; 20 INJECTION, SOLUTION INTRAVENOUS at 09:01

## 2024-04-09 RX ADMIN — SODIUM PHOSPHATE, MONOBASIC, MONOHYDRATE AND SODIUM PHOSPHATE, DIBASIC, ANHYDROUS 9 MMOL: 142; 276 INJECTION, SOLUTION INTRAVENOUS at 06:46

## 2024-04-09 RX ADMIN — THIAMINE HYDROCHLORIDE 100 MG: 100 INJECTION, SOLUTION INTRAMUSCULAR; INTRAVENOUS at 08:16

## 2024-04-09 RX ADMIN — POTASSIUM CHLORIDE 10 MEQ: 7.46 INJECTION, SOLUTION INTRAVENOUS at 06:28

## 2024-04-09 RX ADMIN — CEFTRIAXONE SODIUM 1 G: 1 INJECTION, POWDER, FOR SOLUTION INTRAMUSCULAR; INTRAVENOUS at 14:31

## 2024-04-09 RX ADMIN — PANTOPRAZOLE SODIUM 40 MG: 40 INJECTION, POWDER, FOR SOLUTION INTRAVENOUS at 08:16

## 2024-04-09 RX ADMIN — POTASSIUM CHLORIDE: 2 INJECTION, SOLUTION, CONCENTRATE INTRAVENOUS at 16:10

## 2024-04-09 RX ADMIN — POTASSIUM CHLORIDE 10 MEQ: 7.46 INJECTION, SOLUTION INTRAVENOUS at 10:01

## 2024-04-09 RX ADMIN — FENTANYL CITRATE 50 MCG: 50 INJECTION INTRAMUSCULAR; INTRAVENOUS at 12:16

## 2024-04-09 RX ADMIN — POTASSIUM CHLORIDE 40 MEQ: 1500 TABLET, EXTENDED RELEASE ORAL at 14:22

## 2024-04-09 RX ADMIN — PROPOFOL 200 MCG/KG/MIN: 10 INJECTION, EMULSION INTRAVENOUS at 12:12

## 2024-04-09 RX ADMIN — POTASSIUM CHLORIDE 10 MEQ: 7.46 INJECTION, SOLUTION INTRAVENOUS at 04:13

## 2024-04-09 RX ADMIN — MAGNESIUM SULFATE HEPTAHYDRATE 2 G: 40 INJECTION, SOLUTION INTRAVENOUS at 09:12

## 2024-04-09 RX ADMIN — FOLIC ACID 1 MG: 5 INJECTION, SOLUTION INTRAMUSCULAR; INTRAVENOUS; SUBCUTANEOUS at 09:02

## 2024-04-09 RX ADMIN — LACTULOSE 20 G: 10 SOLUTION ORAL at 08:16

## 2024-04-09 RX ADMIN — POTASSIUM & SODIUM PHOSPHATES POWDER PACK 280-160-250 MG 2 PACKET: 280-160-250 PACK at 22:04

## 2024-04-09 RX ADMIN — LACTULOSE 20 G: 10 SOLUTION ORAL at 20:42

## 2024-04-09 ASSESSMENT — ACTIVITIES OF DAILY LIVING (ADL)
ADLS_ACUITY_SCORE: 34
ADLS_ACUITY_SCORE: 31
ADLS_ACUITY_SCORE: 29
ADLS_ACUITY_SCORE: 31
ADLS_ACUITY_SCORE: 29
ADLS_ACUITY_SCORE: 30
ADLS_ACUITY_SCORE: 31
ADLS_ACUITY_SCORE: 34
ADLS_ACUITY_SCORE: 31
ADLS_ACUITY_SCORE: 29
ADLS_ACUITY_SCORE: 31
ADLS_ACUITY_SCORE: 30
ADLS_ACUITY_SCORE: 31
ADLS_ACUITY_SCORE: 29
ADLS_ACUITY_SCORE: 31
ADLS_ACUITY_SCORE: 34
ADLS_ACUITY_SCORE: 29
ADLS_ACUITY_SCORE: 30
ADLS_ACUITY_SCORE: 31
ADLS_ACUITY_SCORE: 29
ADLS_ACUITY_SCORE: 31
ADLS_ACUITY_SCORE: 29
ADLS_ACUITY_SCORE: 30

## 2024-04-09 NOTE — CONSULTS
4/9/2024  I spoke with pt today. She stated she was sober for a year until she relapsed on alcohol 3/22/24 and continued to drink until 4/7/24. She reports she attends sober support groups and [facility]. She recognizes she needs to obtain a sponsor. She would like to work with a therapist to help her work on trust and issues of grief and loss. I emailed her information about [facility] for individual therapy.    [facility] [place]  Main phone: [phone]  [email or web address]    Hannah Jolley MA [facility]  KARMEN Evaluation Counselor  [phone]  [email or web address]

## 2024-04-09 NOTE — INTERVAL H&P NOTE
I have reviewed the surgical (or preoperative) H&P that is linked to this encounter, and examined the patient. There are no significant changes    Pre-procedure Note    Reason for procedure: hematemsis, anemia, possible varices    History and Physical Reviewed: Reviewed, no changes.    Pre-sedation assessment:    General: alert, appears stated age, and cooperative  Airway: normal  Heart: regular rate and rhythm  Lungs: clear to auscultation bilaterally    Sedation Plan based on assessment: MAC    Mallampati score: Class II (visualization of the soft palate, fauces, and uvula)          ASA Classification: ASA 3 - Patient with moderate systemic disease with functional limitations    Impression: Patient deemed adequate candidate for sedation    Risks, benefits and alternatives were discussed with the patient and informed consent was obtained.    Plan: esophagogastroduodenoscopy    Thank you for the opportunity to participate in the care of this patient. Please feel free to call with any questions or concerns.     Boom Wilkinson MD   Cell [phone]  After 5 PM, please call [phone]      Clinical Conditions Present on Arrival:  Clinically Significant Risk Factors Present on Admission

## 2024-04-09 NOTE — PLAN OF CARE
EGD done. Pt tolerating clear liquid diet, advance to full liquid.  Pt denies nausea or pain.  Monitor electrolytes and replace per protocol.  Questions, cares, and concerns addressed

## 2024-04-09 NOTE — PROGRESS NOTES
[facility]    Medicine Progress Note - Hospitalist Service    Date of Admission:  4/7/2024    Assessment & Plan     Tere Hoyt is a 43 year old female with history of alcohol abuse, alcoholic cirrhosis, alcohol related seizure, HSV-2, depression, and tobacco use disorder admitted on 4/7/2024 as a transfer from [facility] with hematemesis possibly secondary to variceal bleed.     Acute blood loss anemia secondary to GI bleed  UGIB, Possible variceal bleed  Probable portal hypertensive gastropathy/gastritis  PUD not excluded  Coagulopathy due to cirrhosis  -CT scan cirrhosis, portal hypertension, paraesophageal and gastroesophageal varices  -Hemodynamically stable w/o recurrent hematemesis  -Continue octreotide and IV pantoprazole  -N.p.o.   -Continue IV fluid  -Monitor hemoglobin   -EGD today  -Bcx2  -empiric IV CTX  -18g PIV x2  -EGD today     #Decompensated alcoholic cirrhosis, portal HTN, Thrombocytopenia, Coagulopathy, paraesophageal and gastroesophageal varices, hyperammonemic encephalopathy  #Alcoholic hepatitis  #Transaminitis, worsening  -Continue ceftriaxone 1 g daily  -BC's x2 NGTD  -EGD today  -CK mild elevation 1100 -> 800's  -tylenol level normal  -check viral hepatitis panel  -consider hepatic duplex ultrasound. Defer to GI.  -IV PPI q12  -LFT a.m.    Possible Rhabdomyolysis  -CK elevated 1100's -> 800's  -IVF  -CK a.m.    Acute toxic/metabolic encephalopathy:  -improved  -suspect element of hepatic encephalopathy  -CT Head unremarkable for acute pathology  -BC's x 2 NGTD  -multi-factorial  -lactulose when able to swallow. If worsens may need enema  -CT head     Possible alcohol induced acute pancreatitis  Elevated lipase  -Lipase was elevated at 210  -CT did not show evidence of pancreatitis  -IV fluid   -GI following     Chronic alcohol abuse  -Patient was previously sober for about 11 months but started drinking alcohol again about 1-2 weeks ago.   -No evidence  "of alcohol withdrawal at this time  -chemical dependency consult when able  -CIWA protocol, thiamine, folic acid     Hyponatremia  Possibly secondary to poor fluid intake and fluid depletion in the setting of chronic liver disease  IV fluid as above  monitor     Hypokalemia, Hypophosphatemia, Hypomagnesemia  Monitor and replace accordingly  IVF NS w/ 40mEq kcl/l  BMP a.m.     Thrombocytopenia  Due to cirrhosis, blood loss, sepsis less likely but high risk so not excluded as contributory  Monitor platelet count    Hyperglycemia:  -no h/o DM 2  -A1C 5.2% in setting of low Hgb  -sliding scale insulin/accuchecks           Diet: NPO for Medical/Clinical Reasons Except for: No Exceptions    DVT Prophylaxis: Pneumatic Compression Devices  Fernando Catheter: Not present  Lines: None     Cardiac Monitoring: ACTIVE order. Indication: Severe acute blood loss anemia secondary to GI bleed.  Code Status: Full Code      Clinically Significant Risk Factors        # Hypokalemia: Lowest K = 2.6 mmol/L in last 2 days, will replace as needed  # Hyponatremia: Lowest Na = 124 mmol/L in last 2 days, will monitor as appropriate    # Hypomagnesemia: Lowest Mg = 1.6 mg/dL in last 2 days, will replace as needed  # Anion Gap Metabolic Acidosis: Highest Anion Gap = 33 mmol/L in last 2 days, will monitor and treat as appropriate  # Hypoalbuminemia: Lowest albumin = 2.9 g/dL at 4/9/2024  3:04 AM, will monitor as appropriate    # Coagulation Defect: INR = 1.56 (Ref range: 0.85 - 1.15) and/or PTT = 20 Seconds (Ref range: 22 - 38 Seconds), will monitor for bleeding  # Thrombocytopenia: Lowest platelets = 75 in last 2 days, will monitor for bleeding          # Obesity: Estimated body mass index is 35.03 kg/m  as calculated from the following:    Height as of an earlier encounter on 4/7/24: 1.575 m (5' 2\").    Weight as of this encounter: 86.9 kg (191 lb 8 oz)., PRESENT ON ADMISSION  # Severe Malnutrition: based on nutrition assessment, PRESENT ON " ADMISSION          Disposition Plan     Expected Discharge Date: 04/09/2024      Destination: home;home with family              Marques MustafaDO,   Hospitalist Service  [facility]  Securely message with [employer] (more info)  Text page via [employer] Paging/Directory   ______________________________________________________________________    Interval History   Afebrile.  Events overnight noted    Physical Exam   Vital Signs: Temp: 99.6  F (37.6  C) Temp src: Oral BP: 102/63 Pulse: 109   Resp: 18 SpO2: 98 % O2 Device: None (Room air)    Weight: 191 lbs 8 oz    Physical Examination:   General appearance - ill appearing but no acute distress, not toxic  Eyes - pupils equal and reactive, sclera anicteric  Mouth - mucous membranes pasty, pharynx normal without lesions  Lungs - clear to auscultation anteriorly, non labored  Heart - normal rate, regular rhythm, normal S1, S2, no murmurs, rubs, clicks or gallops. No peripheral edema.  Abdomen - soft, nontender, nondistended, BS+  Neurological - alert, oriented x 3, more alert today, no asterixis  Skin - no c/c/p    Lab/imaging reviewed

## 2024-04-09 NOTE — ANESTHESIA CARE TRANSFER NOTE
Patient: Tere Hoyt    Procedure: Procedure(s):  ESOPHAGOGASTRODUODENOSCOPY       Diagnosis: Gastrointestinal hemorrhage with hematemesis [K92.0]  Diagnosis Additional Information: No value filed.    Anesthesia Type:   MAC     Note:    Oropharynx: oropharynx clear of all foreign objects  Level of Consciousness: awake  Oxygen Supplementation: room air    Independent Airway: airway patency satisfactory and stable  Dentition: dentition unchanged  Vital Signs Stable: post-procedure vital signs reviewed and stable  Report to RN Given: handoff report given  Patient transferred to: ICU    ICU Handoff: Call for PAUSE to initiate/utilize ICU HANDOFF, Identified Patient, Identified Responsible Provider, Reviewed the Pertinent Medical History, Discussed Surgical Course, Reviewed Intra-OP Anesthesia Management and Issues during Anesthesia, Set Expectations for Post Procedure Period and Allowed Opportunity for Questions and Acknowledgement of Understanding      Vitals:  Vitals Value Taken Time   /82 04/09/2024   1231   Temp 36.5 04/09/2024   1231      Pulse 75 04/09/2024   1231      Resp 12 04/09/2024   1231      SpO2 98 04/09/2024   1231          Electronically Signed By: SANDHYA Veliz CRNA  April 9, 2024  12:30 PM

## 2024-04-09 NOTE — PLAN OF CARE
Problem: Adult Inpatient Plan of Care  Goal: Absence of Hospital-Acquired Illness or Injury  Intervention: Identify and Manage Fall Risk  Recent Flowsheet Documentation  Taken 4/9/2024 0800 by Sunshine Ugalde RN  Safety Promotion/Fall Prevention: room near nurse's station  Intervention: Prevent Skin Injury  Recent Flowsheet Documentation  Taken 4/9/2024 0800 by Sunshine Ugalde RN  Body Position: sitting up in bed  Intervention: Prevent and Manage VTE (Venous Thromboembolism) Risk  Recent Flowsheet Documentation  Taken 4/9/2024 0800 by Sunshine Ugalde RN  VTE Prevention/Management: SCDs (sequential compression devices) on  Intervention: Prevent Infection  Recent Flowsheet Documentation  Taken 4/9/2024 0800 by Sunshine Ugalde RN  Infection Prevention:   hand hygiene promoted   single patient room provided   rest/sleep promoted   equipment surfaces disinfected  Goal: Optimal Comfort and Wellbeing  Intervention: Provide Person-Centered Care  Recent Flowsheet Documentation  Taken 4/9/2024 0800 by Sunshine Ugalde RN  Trust Relationship/Rapport:   care explained   choices provided   emotional support provided   empathic listening provided   questions answered   questions encouraged   reassurance provided   thoughts/feelings acknowledged     Problem: Skin Injury Risk Increased  Goal: Skin Health and Integrity  Intervention: Plan: Nurse Driven Intervention: Moisture Management  Recent Flowsheet Documentation  Taken 4/9/2024 0900 by Sunshine Ugalde RN  Bathing/Skin Care:   back care   bath, complete   wipes, CHG   dressed/undressed   electrode patches/site rotation   incontinence care   linen changed  Intervention: Plan: Nurse Driven Intervention: Friction and Shear  Recent Flowsheet Documentation  Taken 4/9/2024 0800 by Sunshine Ugalde RN  Friction/Shear Interventions: HOB 30 degrees or less  Intervention: Optimize Skin Protection  Recent Flowsheet Documentation  Taken 4/9/2024 0800 by Sunshine Ugalde RN  Activity  Management: bedrest  Head of Bed (HOB) Positioning: HOB at 60-90 degrees  Pt alert/oriented. Replace electrolytes per protocols.  NPO until results from EGD

## 2024-04-09 NOTE — PLAN OF CARE
[facility] - ICU    RN Progress Note:            Pertinent Assessments:      Please refer to flowsheet rows for full assessment     High urine output, 2750mL out over last 12 hours           Key Events - This Shift:       - CIWA scale 2 throughout shift.  - Vital signs stable.  - Pt had several questions about her condition. Would like to talk to hospitalist during rounds.                   Barriers to Discharge / Downgrade:     Electrolyte imbalance. Pending EGD

## 2024-04-09 NOTE — ANESTHESIA PREPROCEDURE EVALUATION
Anesthesia Pre-Procedure Evaluation    Patient: Tere Hoyt   MRN: [id number] : 1980        Procedure : Procedure(s):  ESOPHAGOGASTRODUODENOSCOPY          No past medical history on file.   No past surgical history on file.   No Known Allergies   Social History     Tobacco Use    Smoking status: Former     Types: Cigarettes    Smokeless tobacco: Never   Substance Use Topics    Alcohol use: Not on file      Wt Readings from Last 1 Encounters:   24 86.9 kg (191 lb 8 oz)        Anesthesia Evaluation            ROS/MED HX  ENT/Pulmonary:  - neg pulmonary ROS     Neurologic:  - neg neurologic ROS     Cardiovascular:    (-) murmur   METS/Exercise Tolerance:     Hematologic: Comments: Hepatic coagulopathy  Thrombocytopenia      Musculoskeletal:  - neg musculoskeletal ROS     GI/Hepatic: Comment: Paraesophageal and gastroesophageal varices  UGIB  Probable portal hypertensive gastropathy/gastritis  No episodes of emesis since transfer to [facility] per pt. Denies nausea, reflux.    (+)           hepatitis (On octreotide infusion) type Alcoholic, liver disease (EtOH cirrhosis),    (-) GERD   Renal/Genitourinary:       Endo: Comment: Hypokalemia    (+)               Obesity,       Psychiatric/Substance Use:  - neg psychiatric ROS     Infectious Disease:  - neg infectious disease ROS     Malignancy:  - neg malignancy ROS     Other:            Physical Exam    Airway  airway exam normal      Mallampati: II   TM distance: > 3 FB   Neck ROM: full   Mouth opening: > 3 cm    Respiratory Devices and Support         Dental       (+) Minor Abnormalities - some fillings, tiny chips      Cardiovascular          Rhythm and rate: regular and tachycardia (-) no murmur    Pulmonary   pulmonary exam normal        breath sounds clear to auscultation           OUTSIDE LABS:  CBC:   Lab Results   Component Value Date    WBC 5.5 2024    WBC 11.9 (H) 2024    HGB 7.8 (L) 2024    HGB 7.5 (L) 2024    HCT 22.1 (L)  04/09/2024    HCT 18.1 (L) 04/07/2024    PLT 75 (L) 04/09/2024     (L) 04/07/2024     BMP:   Lab Results   Component Value Date     04/09/2024     04/08/2024    POTASSIUM 2.6 (LL) 04/09/2024    POTASSIUM 4.0 04/08/2024    CHLORIDE 104 04/09/2024    CHLORIDE 98 04/08/2024    CO2 25 04/09/2024    CO2 24 04/08/2024    BUN 10.5 04/09/2024    BUN 29.3 (H) 04/08/2024    CR 0.60 04/09/2024    CR 0.78 04/08/2024     (H) 04/09/2024     (H) 04/09/2024     COAGS:   Lab Results   Component Value Date    PTT 20 (L) 04/07/2024    INR 1.56 (H) 04/08/2024     POC:   Lab Results   Component Value Date    HCGS Negative 04/07/2024     HEPATIC:   Lab Results   Component Value Date    ALBUMIN 2.9 (L) 04/09/2024    PROTTOTAL 5.1 (L) 04/09/2024    ALT 1,237 (HH) 04/09/2024    AST 2,043 (HH) 04/09/2024    ALKPHOS 63 04/09/2024    BILITOTAL 0.9 04/09/2024    SILVESTRE 26 04/08/2024     OTHER:   Lab Results   Component Value Date    A1C 5.2 04/08/2024    JUSTICE 7.1 (L) 04/09/2024    PHOS 1.9 (L) 04/09/2024    MAG 1.6 (L) 04/09/2024    LIPASE 210 (H) 04/07/2024    TSH 6.39 (H) 02/13/2023    T4 1.23 02/13/2023       Anesthesia Plan    ASA Status:  3    NPO Status:  NPO Appropriate    Anesthesia Type: MAC.     - Reason for MAC: straight local not clinically adequate              Consents    Anesthesia Plan(s) and associated risks, benefits, and realistic alternatives discussed. Questions answered and patient/representative(s) expressed understanding.     - Discussed: Risks, Benefits and Alternatives for BOTH SEDATION and the PROCEDURE were discussed     - Discussed with:  Patient            Postoperative Care    Pain management: IV analgesics, Oral pain medications, Multi-modal analgesia.   PONV prophylaxis: Ondansetron (or other 5HT-3), Background Propofol Infusion     Comments:    Other Comments: Risks of MAC anesthesia including but not limited to recall, awareness, and potential conversion to general anesthesia  discussed.           Drew Evans MD    I have reviewed the pertinent notes and labs in the chart from the past 30 days and (re)examined the patient.  Any updates or changes from those notes are reflected in this note.

## 2024-04-10 ENCOUNTER — APPOINTMENT (OUTPATIENT)
Dept: PHYSICAL THERAPY | Facility: HOSPITAL | Age: 44
DRG: 377 | End: 2024-04-10
Attending: INTERNAL MEDICINE
Payer: COMMERCIAL

## 2024-04-10 LAB
ALBUMIN SERPL BCG-MCNC: 2.8 G/DL (ref 3.5–5.2)
ALP SERPL-CCNC: 62 U/L (ref 40–150)
ALT SERPL W P-5'-P-CCNC: 821 U/L (ref 0–50)
ANION GAP SERPL CALCULATED.3IONS-SCNC: 10 MMOL/L (ref 7–15)
AST SERPL W P-5'-P-CCNC: 872 U/L (ref 0–45)
BASOPHILS # BLD AUTO: 0 10E3/UL (ref 0–0.2)
BASOPHILS NFR BLD AUTO: 1 %
BILIRUB DIRECT SERPL-MCNC: 0.35 MG/DL (ref 0–0.3)
BILIRUB SERPL-MCNC: 0.7 MG/DL
BUN SERPL-MCNC: 4.4 MG/DL (ref 6–20)
CALCIUM SERPL-MCNC: 7.6 MG/DL (ref 8.6–10)
CHLORIDE SERPL-SCNC: 103 MMOL/L (ref 98–107)
CK SERPL-CCNC: 534 U/L (ref 26–192)
CREAT SERPL-MCNC: 0.57 MG/DL (ref 0.51–0.95)
DEPRECATED HCO3 PLAS-SCNC: 23 MMOL/L (ref 22–29)
EGFRCR SERPLBLD CKD-EPI 2021: >90 ML/MIN/1.73M2
EOSINOPHIL # BLD AUTO: 0.1 10E3/UL (ref 0–0.7)
EOSINOPHIL NFR BLD AUTO: 2 %
ERYTHROCYTE [DISTWIDTH] IN BLOOD BY AUTOMATED COUNT: 15.5 % (ref 10–15)
GLUCOSE BLDC GLUCOMTR-MCNC: 115 MG/DL (ref 70–99)
GLUCOSE BLDC GLUCOMTR-MCNC: 125 MG/DL (ref 70–99)
GLUCOSE BLDC GLUCOMTR-MCNC: 133 MG/DL (ref 70–99)
GLUCOSE BLDC GLUCOMTR-MCNC: 150 MG/DL (ref 70–99)
GLUCOSE BLDC GLUCOMTR-MCNC: 171 MG/DL (ref 70–99)
GLUCOSE SERPL-MCNC: 112 MG/DL (ref 70–99)
HCT VFR BLD AUTO: 22 % (ref 35–47)
HGB BLD-MCNC: 7.5 G/DL (ref 11.7–15.7)
IMM GRANULOCYTES # BLD: 0.1 10E3/UL
IMM GRANULOCYTES NFR BLD: 2 %
LYMPHOCYTES # BLD AUTO: 2 10E3/UL (ref 0.8–5.3)
LYMPHOCYTES NFR BLD AUTO: 35 %
MAGNESIUM SERPL-MCNC: 1.8 MG/DL (ref 1.7–2.3)
MCH RBC QN AUTO: 28.5 PG (ref 26.5–33)
MCHC RBC AUTO-ENTMCNC: 34.1 G/DL (ref 31.5–36.5)
MCV RBC AUTO: 84 FL (ref 78–100)
MONOCYTES # BLD AUTO: 0.7 10E3/UL (ref 0–1.3)
MONOCYTES NFR BLD AUTO: 12 %
NEUTROPHILS # BLD AUTO: 2.8 10E3/UL (ref 1.6–8.3)
NEUTROPHILS NFR BLD AUTO: 48 %
NRBC # BLD AUTO: 0.1 10E3/UL
NRBC BLD AUTO-RTO: 2 /100
PATH REPORT.ADDENDUM SPEC: NORMAL
PATH REPORT.COMMENTS IMP SPEC: NORMAL
PATH REPORT.FINAL DX SPEC: NORMAL
PATH REPORT.GROSS SPEC: NORMAL
PATH REPORT.MICROSCOPIC SPEC OTHER STN: NORMAL
PATH REPORT.RELEVANT HX SPEC: NORMAL
PHOSPHATE SERPL-MCNC: 2 MG/DL (ref 2.5–4.5)
PHOTO IMAGE: NORMAL
PLATELET # BLD AUTO: 97 10E3/UL (ref 150–450)
POTASSIUM SERPL-SCNC: 4 MMOL/L (ref 3.4–5.3)
PROT SERPL-MCNC: 5.1 G/DL (ref 6.4–8.3)
RBC # BLD AUTO: 2.63 10E6/UL (ref 3.8–5.2)
SODIUM SERPL-SCNC: 136 MMOL/L (ref 135–145)
WBC # BLD AUTO: 5.7 10E3/UL (ref 4–11)

## 2024-04-10 PROCEDURE — 250N000013 HC RX MED GY IP 250 OP 250 PS 637: Performed by: INTERNAL MEDICINE

## 2024-04-10 PROCEDURE — 97116 GAIT TRAINING THERAPY: CPT | Mod: GP

## 2024-04-10 PROCEDURE — 83735 ASSAY OF MAGNESIUM: CPT | Performed by: INTERNAL MEDICINE

## 2024-04-10 PROCEDURE — 99233 SBSQ HOSP IP/OBS HIGH 50: CPT | Performed by: INTERNAL MEDICINE

## 2024-04-10 PROCEDURE — 84100 ASSAY OF PHOSPHORUS: CPT | Performed by: INTERNAL MEDICINE

## 2024-04-10 PROCEDURE — 82248 BILIRUBIN DIRECT: CPT | Performed by: INTERNAL MEDICINE

## 2024-04-10 PROCEDURE — 82550 ASSAY OF CK (CPK): CPT | Performed by: INTERNAL MEDICINE

## 2024-04-10 PROCEDURE — 85025 COMPLETE CBC W/AUTO DIFF WBC: CPT | Performed by: INTERNAL MEDICINE

## 2024-04-10 PROCEDURE — 250N000011 HC RX IP 250 OP 636: Performed by: INTERNAL MEDICINE

## 2024-04-10 PROCEDURE — 120N000001 HC R&B MED SURG/OB

## 2024-04-10 PROCEDURE — 258N000003 HC RX IP 258 OP 636: Performed by: INTERNAL MEDICINE

## 2024-04-10 PROCEDURE — 36415 COLL VENOUS BLD VENIPUNCTURE: CPT | Performed by: INTERNAL MEDICINE

## 2024-04-10 PROCEDURE — 80048 BASIC METABOLIC PNL TOTAL CA: CPT | Performed by: INTERNAL MEDICINE

## 2024-04-10 PROCEDURE — 97161 PT EVAL LOW COMPLEX 20 MIN: CPT | Mod: GP

## 2024-04-10 RX ORDER — ACETAMINOPHEN 325 MG/1
325 TABLET ORAL ONCE
Status: DISCONTINUED | OUTPATIENT
Start: 2024-04-10 | End: 2024-04-11 | Stop reason: HOSPADM

## 2024-04-10 RX ORDER — CEFDINIR 300 MG/1
300 CAPSULE ORAL EVERY 12 HOURS SCHEDULED
Qty: 8 CAPSULE | Refills: 0 | Status: DISCONTINUED | OUTPATIENT
Start: 2024-04-10 | End: 2024-04-11 | Stop reason: HOSPADM

## 2024-04-10 RX ORDER — PANTOPRAZOLE SODIUM 40 MG/1
40 TABLET, DELAYED RELEASE ORAL
Status: DISCONTINUED | OUTPATIENT
Start: 2024-04-10 | End: 2024-04-11 | Stop reason: HOSPADM

## 2024-04-10 RX ORDER — FOLIC ACID 1 MG/1
1 TABLET ORAL DAILY
Status: DISCONTINUED | OUTPATIENT
Start: 2024-04-10 | End: 2024-04-11 | Stop reason: HOSPADM

## 2024-04-10 RX ADMIN — THIAMINE HCL TAB 100 MG 100 MG: 100 TAB at 10:28

## 2024-04-10 RX ADMIN — POTASSIUM & SODIUM PHOSPHATES POWDER PACK 280-160-250 MG 1 PACKET: 280-160-250 PACK at 13:42

## 2024-04-10 RX ADMIN — PANTOPRAZOLE SODIUM 40 MG: 40 TABLET, DELAYED RELEASE ORAL at 10:28

## 2024-04-10 RX ADMIN — CEFDINIR 300 MG: 300 CAPSULE ORAL at 13:43

## 2024-04-10 RX ADMIN — CEFDINIR 300 MG: 300 CAPSULE ORAL at 20:22

## 2024-04-10 RX ADMIN — POTASSIUM CHLORIDE: 2 INJECTION, SOLUTION, CONCENTRATE INTRAVENOUS at 00:13

## 2024-04-10 RX ADMIN — POTASSIUM & SODIUM PHOSPHATES POWDER PACK 280-160-250 MG 1 PACKET: 280-160-250 PACK at 10:20

## 2024-04-10 RX ADMIN — FOLIC ACID 1 MG: 1 TABLET ORAL at 10:28

## 2024-04-10 RX ADMIN — LACTULOSE 20 G: 10 SOLUTION ORAL at 10:21

## 2024-04-10 RX ADMIN — LACTULOSE 20 G: 10 SOLUTION ORAL at 20:22

## 2024-04-10 RX ADMIN — POTASSIUM & SODIUM PHOSPHATES POWDER PACK 280-160-250 MG 1 PACKET: 280-160-250 PACK at 05:40

## 2024-04-10 RX ADMIN — PANTOPRAZOLE SODIUM 40 MG: 40 TABLET, DELAYED RELEASE ORAL at 17:17

## 2024-04-10 ASSESSMENT — ACTIVITIES OF DAILY LIVING (ADL)
ADLS_ACUITY_SCORE: 27
ADLS_ACUITY_SCORE: 33
ADLS_ACUITY_SCORE: 33
ADLS_ACUITY_SCORE: 24
ADLS_ACUITY_SCORE: 33
ADLS_ACUITY_SCORE: 31
ADLS_ACUITY_SCORE: 31
ADLS_ACUITY_SCORE: 33
ADLS_ACUITY_SCORE: 31
ADLS_ACUITY_SCORE: 33
ADLS_ACUITY_SCORE: 33
ADLS_ACUITY_SCORE: 27
ADLS_ACUITY_SCORE: 31
ADLS_ACUITY_SCORE: 34
ADLS_ACUITY_SCORE: 24
ADLS_ACUITY_SCORE: 33
ADLS_ACUITY_SCORE: 27
ADLS_ACUITY_SCORE: 33
ADLS_ACUITY_SCORE: 33
ADLS_ACUITY_SCORE: 27
ADLS_ACUITY_SCORE: 33
ADLS_ACUITY_SCORE: 27
ADLS_ACUITY_SCORE: 31

## 2024-04-10 NOTE — PROGRESS NOTES
Pt transferred from ICU to  room 205 around 1455 today.  Pt was placed in chair.  She has not been out of bed since admission due to feeling dizzy.  She needed to use bathroom.  Up with assist of 2 rolling walker and gait belt.  Pt did not complain of dizziness when up.

## 2024-04-10 NOTE — PROGRESS NOTES
[facility] - ICU    RN Progress Note:            Pertinent Assessments:      Please refer to flowsheet rows for full assessment     Patient alert and oriented. VSS. Scoring 0 on CIWA. Patient on IVF. Tolerating full liquid diet. Monitoring electrolytes, Hgb, and LFTs.           Key Events - This Shift:     Patient awake and not drowsy anymore. When patient stood at the bedside, she was unsteady, shakey, and became dizzy. Patient laid back down in bed and had to catch her breath.     Patient educated on plan of care. Answered all questions and concerns. Verbalized understanding. Plan of care ongoing.             Barriers to Discharge / Downgrade:     Patient is med [facility] status.

## 2024-04-10 NOTE — DISCHARGE INSTRUCTIONS
[facility]  [address], [place] [address]     [phone]    [facility]  [address], [place] [address] [phone]

## 2024-04-10 NOTE — PROGRESS NOTES
[facility] - GASTROENTEROLOGY PROGRESS NOTE     SUBJECTIVE:  Tolerating regular diet. No abd pain.  Hasn't had a BM in a few day. No longer having withdrawal.        OBJECTIVE:  /67   Pulse 111   Temp 98.2  F (36.8  C) (Oral)   Resp 23   Wt 86.9 kg (191 lb 8 oz)   SpO2 99%   BMI 35.03 kg/m    Temp (24hrs), Av.3  F (36.8  C), Min:97.9  F (36.6  C), Max:98.9  F (37.2  C)    Patient Vitals for the past 72 hrs:   Weight   24 0654 86.9 kg (191 lb 8 oz)   24 2115 87.3 kg (192 lb 8 oz)        PHYSICAL EXAM  GEN: Alert, no distress  ABD: Soft, non-tender    Additional Data:  I have reviewed the patient's new clinical lab results:   Recent Labs   Lab Test 04/10/24  0422 24  0304 24  0012 24  1747 24  1054 24  1837 24  1303 02/15/23  0641 23  0714   WBC 5.7 5.5  --   --   --   --  11.9*   < > 7.8   HGB 7.5* 7.8* 7.5*   < > 7.4*   < > 6.5*   < > 10.5*   MCV 84 80  --   --   --   --  81   < > 98   PLT 97* 75*  --   --   --   --  113*   < > 182   INR  --   --   --   --  1.56*  --  1.97*  --  1.19*    < > = values in this interval not displayed.     Recent Labs   Lab Test 04/10/24  1341 04/10/24  0752 04/10/24  0352 04/10/24  0252 24  2050 24  1739 24  1133 24  1043 24  0436 24  0304 24  1452 24  0734   NA  --   --   --  136  --   --   --   --   --  141  --  135   POTASSIUM  --   --   --  4.0  --  3.9  --  3.3*  --  2.6*   < > 2.6*   CHLORIDE  --   --   --  103  --   --   --   --   --  104  --  98   CO2  --   --   --  23  --   --   --   --   --  25  --  24   BUN  --   --   --  4.4*  --   --   --   --   --  10.5  --  29.3*   CR  --   --   --  0.57  --   --   --   --   --  0.60  --  0.78   ANIONGAP  --   --   --  10  --   --   --   --   --  12  --  13   JUSTICE  --   --   --  7.6*  --   --   --   --   --  7.1*  --  7.5*   * 133* 115* 112*   < >  --    < >  --    < > 107*   < > 144*    < > = values in this interval not  displayed.     Recent Labs   Lab Test 04/10/24  0252 04/09/24  0304 04/08/24  0734 04/07/24  2021 04/07/24  1303 03/14/23  0615 03/13/23  1641 02/15/23  0641 02/14/23  0714 02/13/23  1554   ALBUMIN 2.8* 2.9* 3.4*  --  3.6   < > 2.7* 2.0*   < >  --    BILITOTAL 0.7 0.9 1.3*  --  1.7*   < > 1.8* 7.5*   < >  --    * 1,237* 825*  --  500*   < > 40 50*   < >  --    * 2,043* 1,634*  --  896*   < > 102* 87*   < >  --    PROTEIN  --   --   --  20*  --   --  Negative  --   --  Negative   LIPASE  --   --   --   --  210*  --  157* 27  --   --     < > = values in this interval not displayed.        IMPRESSION/Plan:  Cirrhosis associated with alcohol use disorder with ongoing drinking and portal hypertension, meld of 14  Varices seen on imaging but none on EGD  Encephalopathy, on lactulose  Shock liver - improved  Minimal etoh hepatitis with normal bilirubin  Patient follows with [facility] - will continue with them.   Upper GI bleeding with hematemesis and hemoglobin of 6.5, now stable without signs of further active bleeding.  EGD with just gastritis, no bleeding, no varices.   Agree with antibiotics for high risk of infection with GI bleeding and cirrhosis      GI will sign off, call with questions.      20 minutes of total time was spent providing patient care, including patient evaluation, reviewing documentation/test results, coordination of care with other providers, and .    Boom Wilkinson  Cell [phone]  After 5 PM, please call [phone]

## 2024-04-10 NOTE — PROGRESS NOTES
[facility]    Medicine Progress Note - Hospitalist Service    Date of Admission:  4/7/2024    Assessment & Plan   Tere Hoyt is a 43 year old female with history of alcohol abuse, alcoholic cirrhosis, alcohol related seizure, HSV-2, depression, and tobacco use disorder admitted on 4/7/2024 as a transfer from [facility] with hematemesis possibly secondary to variceal bleed.     Acute blood loss anemia secondary to GI bleed  UGIB, resolved  Portal hypertensive gastropathy/gastritis  Coagulopathy due to cirrhosis  -CT scan cirrhosis, portal hypertension, paraesophageal and gastroesophageal varices  -EGD (4/9/24) showed gastritis which was biopsied.  Pathology pending.  Remainder of EGD unremarkable.  -Hemodynamically stable w/o recurrent hematemesis  -stopped octreotide on 4/9/24 following EGD showing no varices as originally identified on CT A/P.  -Bcx2 NGTD  -empiric IV CTX.  Will change to Cefdinir 300mg PO BID x 4 days for a total of 7 day of antimicrobial therapy  -PO PPI BID  -full liquid diet.  ADAT.   -SL IV fluid     #Decompensated alcoholic cirrhosis, portal HTN, Thrombocytopenia, Coagulopathy, paraesophageal and gastroesophageal varices ((on CT but not evident on EGD), toxic/metabolic encephalopathy (resolved)  #Alcoholic hepatitis, improving  #Transaminitis, improving  -Continue ceftriaxone 1 g daily  -BC's x2 NGTD  -EGD today  -CK mild elevation 1100 -> 800's -> 500's  -tylenol level normal  -viral hepatitis panel pending  -hold on hepatic duplex ultrasound given improving LFT's  -IV PPI q12  -LFT a.m.  -GI following.  Assistance appreciated.    Possible Rhabdomyolysis  -refer to above  -SL IVF    Acute toxic/metabolic encephalopathy:  -resolved  -suspect element of hepatic encephalopathy  -CT Head unremarkable for acute pathology  -BC's x 2 NGTD  -lactulose 20g BID. Titrate to 2-3 BM's per 24hrs     Possible alcohol induced acute pancreatitis  Elevated  "lipase  -Lipase was elevated at 210  -CT did not show evidence of pancreatitis  -no current e/o acute pancreatitis     Chronic alcohol abuse  -Patient was previously sober for about 11 months but started drinking alcohol again about 1-2 weeks ago.   -No evidence of alcohol withdrawal at this time  -chemical dependency consult obtained  -stop [facility] protocol  -continue thiamine, folic acid     Hyponatremia  -secondary to poor fluid intake and fluid depletion in the setting of chronic liver disease  -resolved     Hypokalemia (resolved), Hypophosphatemia (replacing), Hypomagnesemia (resolved)  -Monitor and replace accordingly  -SL IVF     Thrombocytopenia  -Due to cirrhosis, blood loss, sepsis less likely but high risk so not excluded as contributory  -stable/improving  -CBC a.m.    Hyperglycemia:  -no h/o DM 2  -A1C 5.2% in setting of low Hgb  -sliding scale insulin/accuchecks discontinued             Diet: Regular Diet Adult    DVT Prophylaxis: Pneumatic Compression Devices, ambulate  Fernando Catheter: Not present  Lines: None     Cardiac Monitoring: None  Code Status: Full Code      Clinically Significant Risk Factors        # Hypokalemia: Lowest K = 2.6 mmol/L in last 2 days, will replace as needed     # Hypomagnesemia: Lowest Mg = 1.6 mg/dL in last 2 days, will replace as needed   # Hypoalbuminemia: Lowest albumin = 2.8 g/dL at 4/10/2024  2:52 AM, will monitor as appropriate  # Coagulation Defect: INR = 1.56 (Ref range: 0.85 - 1.15) and/or PTT = 20 Seconds (Ref range: 22 - 38 Seconds), will monitor for bleeding  # Thrombocytopenia: Lowest platelets = 75 in last 2 days, will monitor for bleeding          # Obesity: Estimated body mass index is 35.03 kg/m  as calculated from the following:    Height as of an earlier encounter on 4/7/24: 1.575 m (5' 2\").    Weight as of this encounter: 86.9 kg (191 lb 8 oz)., PRESENT ON ADMISSION  # Severe Malnutrition: based on nutrition assessment, PRESENT ON ADMISSION      "     Disposition Plan     Medically Ready for Discharge: Anticipated Tomorrow             Marques Mustafa DO,   Hospitalist Service  [facility]  Securely message with [employer] (more info)  Text page via [employer] Paging/Directory   ______________________________________________________________________    Interval History   Afebrile.  No acute events overnight.    Physical Exam   Vital Signs: Temp: 97.9  F (36.6  C) Temp src: Oral BP: 105/55 Pulse: 103   Resp: 23 SpO2: 100 % O2 Device: None (Room air) Oxygen Delivery: 2 LPM  Weight: 191 lbs 8 oz    General appearance - more alert, nad  Eyes - pupils equal and reactive, sclera anicteric  Lungs - ctab  Heart - normal rate, regular rhythm, normal S1, S2, no murmurs, rubs, clicks or gallops. No peripheral edema.  Abdomen - soft, nontender, nondistended, BS+  Neurological - alert, oriented x 3, no asterixis  Skin - no c/c/p     Lab/imaging reviewed

## 2024-04-10 NOTE — PROGRESS NOTES
SOCIAL WORK ASSIST      SW met with pt in pt room along with SW intern to offer supports for pt regarding substance use and mental health supports. Pt states she goes to [facility] and knows of groups. She feels at this time that the groups are not the best for her and she is looking for more 1 on 1 mental health support. Pt discussed the passing of her . Pt states she never sought out grief support and would like that. SW offered to assist with mental health resources, and pt agreed.   SW offered to meet with pt tomorrow to offer resources as pt was emotional and crying. SW offered supportive listening and will reapproach .      FREDERICK Munguia

## 2024-04-10 NOTE — PROGRESS NOTES
04/10/24 1540   Appointment Info   Signing Clinician's Name / Credentials (PT) Makayla Quezada PT   Living Environment   People in Home parent(s)  (father)   Current Living Arrangements house   Home Accessibility stairs within home   Number of Stairs, Within Home, Primary six   Stair Railings, Within Home, Primary railings safe and in good condition   Transportation Anticipated family or friend will provide   Living Environment Comments 4 level home with about 6 steps between all levels; bedroom upper level; has tub/shower for bathing   Self-Care   Equipment Currently Used at Home none   Activity/Exercise/Self-Care Comment normally independent with ADL/IADL's; mother had to assist pt with bathing prior to hospital stay due to weakness   General Information   Onset of Illness/Injury or Date of Surgery 04/07/24   Referring Physician Dr. Mustafa   Patient/Family Therapy Goals Statement (PT) more walking   Pertinent History of Current Problem (include personal factors and/or comorbidities that impact the POC) GIB s/p EGD 4/9, ETOH cirrhosis, gastritis, rhabdo, encephalopathy   Existing Precautions/Restrictions fall   Cognition   Affect/Mental Status (Cognition) flat/blunted affect   Orientation Status (Cognition) oriented x 4   Follows Commands (Cognition) WFL   Range of Motion (ROM)   Range of Motion ROM is WFL   Strength (Manual Muscle Testing)   Strength (Manual Muscle Testing) Deficits observed during functional mobility   Strength Comments BLE weakness; R hip flex limited in supine SLR due to painful hip   Bed Mobility   Bed Mobility supine-sit   Supine-Sit Elbert (Bed Mobility) supervision;verbal cues   Assistive Device (Bed Mobility) bed rails   Transfers   Transfers sit-stand transfer   Sit-Stand Transfer   Sit-Stand Elbert (Transfers) contact guard;verbal cues;nonverbal cues (demo/gesture)   Assistive Device (Sit-Stand Transfers) walker, front-wheeled   Comment, (Sit-Stand Transfer) cuing for hand  placement with FWW   Gait/Stairs (Locomotion)   Kellogg Level (Gait) contact guard;verbal cues   Assistive Device (Gait) walker, front-wheeled   Distance in Feet (Gait) 60   Pattern (Gait) step-to   Deviations/Abnormal Patterns (Gait) trina decreased;gait speed decreased;stride length decreased;weight shifting decreased;base of support, narrow   Clinical Impression   Criteria for Skilled Therapeutic Intervention Yes, treatment indicated   PT Diagnosis (PT) impaired functional mobility   Influenced by the following impairments decreased strength, balance, activity tolerance   Functional limitations due to impairments bed mob, transfers, gait, stairs   Clinical Presentation (PT Evaluation Complexity) stable   Clinical Presentation Rationale pt presents as medically diagnosed   Clinical Decision Making (Complexity) low complexity   Planned Therapy Interventions (PT) balance training;bed mobility training;gait training;home exercise program;neuromuscular re-education;patient/family education;stair training;strengthening;transfer training   Risk & Benefits of therapy have been explained evaluation/treatment results reviewed;patient   PT Total Evaluation Time   PT Eval, Low Complexity Minutes (44700) 15   Physical Therapy Goals   PT Frequency Daily   PT Predicted Duration/Target Date for Goal Attainment 04/17/24   PT Goals Bed Mobility;Transfers;Stairs;Gait   PT: Bed Mobility Independent;Supine to/from sit   PT: Transfers Modified independent;Sit to/from stand;Assistive device   PT: Gait Supervision/stand-by assist;Rolling walker;150 feet   PT: Stairs Supervision/stand-by assist;6 stairs;Rail on left;Rail on right   PT Discharge Planning   PT Plan gait with FWW, LE  ex, stairs when able   PT Discharge Recommendation (DC Rec) Transitional Care Facility   PT Rationale for DC Rec may progress to home with family assist; currently needing walker and assist of 1 for mobility when previously independent with mobility and  ADL's   PT Brief overview of current status amb. 60 feet with walker cga   PT Equipment Needed at Discharge walker, rolling  (needs for home)   Total Session Time   Total Session Time (sum of timed and untimed services) 15

## 2024-04-11 VITALS
TEMPERATURE: 98.9 F | HEART RATE: 111 BPM | WEIGHT: 198.3 LBS | DIASTOLIC BLOOD PRESSURE: 62 MMHG | BODY MASS INDEX: 36.27 KG/M2 | OXYGEN SATURATION: 98 % | RESPIRATION RATE: 20 BRPM | SYSTOLIC BLOOD PRESSURE: 115 MMHG

## 2024-04-11 PROBLEM — K70.30 ALCOHOLIC CIRRHOSIS OF LIVER WITHOUT ASCITES (H): Status: ACTIVE | Noted: 2023-04-07

## 2024-04-11 PROBLEM — D62 ANEMIA DUE TO BLOOD LOSS, ACUTE: Status: ACTIVE | Noted: 2024-04-11

## 2024-04-11 LAB
ALBUMIN SERPL BCG-MCNC: 3.1 G/DL (ref 3.5–5.2)
ALP SERPL-CCNC: 97 U/L (ref 40–150)
ALT SERPL W P-5'-P-CCNC: 591 U/L (ref 0–50)
AST SERPL W P-5'-P-CCNC: 324 U/L (ref 0–45)
BILIRUB DIRECT SERPL-MCNC: 0.31 MG/DL (ref 0–0.3)
BILIRUB SERPL-MCNC: 0.7 MG/DL
CA-I BLD-MCNC: 4.5 MG/DL (ref 4.4–5.2)
CALCIUM SERPL-MCNC: 8.7 MG/DL (ref 8.6–10)
HGB BLD-MCNC: 8.3 G/DL (ref 11.7–15.7)
MAGNESIUM SERPL-MCNC: 1.8 MG/DL (ref 1.7–2.3)
PHOSPHATE SERPL-MCNC: 3.9 MG/DL (ref 2.5–4.5)
POTASSIUM SERPL-SCNC: 3.8 MMOL/L (ref 3.4–5.3)
PROT SERPL-MCNC: 5.8 G/DL (ref 6.4–8.3)

## 2024-04-11 PROCEDURE — 250N000013 HC RX MED GY IP 250 OP 250 PS 637: Performed by: INTERNAL MEDICINE

## 2024-04-11 PROCEDURE — 82247 BILIRUBIN TOTAL: CPT | Performed by: INTERNAL MEDICINE

## 2024-04-11 PROCEDURE — 84132 ASSAY OF SERUM POTASSIUM: CPT | Performed by: INTERNAL MEDICINE

## 2024-04-11 PROCEDURE — 36415 COLL VENOUS BLD VENIPUNCTURE: CPT | Performed by: INTERNAL MEDICINE

## 2024-04-11 PROCEDURE — 84155 ASSAY OF PROTEIN SERUM: CPT | Performed by: INTERNAL MEDICINE

## 2024-04-11 PROCEDURE — 97116 GAIT TRAINING THERAPY: CPT | Mod: GP

## 2024-04-11 PROCEDURE — 99239 HOSP IP/OBS DSCHRG MGMT >30: CPT | Performed by: INTERNAL MEDICINE

## 2024-04-11 PROCEDURE — 85018 HEMOGLOBIN: CPT | Performed by: INTERNAL MEDICINE

## 2024-04-11 PROCEDURE — 83735 ASSAY OF MAGNESIUM: CPT | Performed by: INTERNAL MEDICINE

## 2024-04-11 PROCEDURE — 84100 ASSAY OF PHOSPHORUS: CPT | Performed by: INTERNAL MEDICINE

## 2024-04-11 PROCEDURE — 82310 ASSAY OF CALCIUM: CPT | Performed by: INTERNAL MEDICINE

## 2024-04-11 PROCEDURE — 82330 ASSAY OF CALCIUM: CPT | Performed by: INTERNAL MEDICINE

## 2024-04-11 RX ORDER — LANOLIN ALCOHOL/MO/W.PET/CERES
100 CREAM (GRAM) TOPICAL DAILY
Qty: 30 TABLET | Refills: 0 | Status: SHIPPED | OUTPATIENT
Start: 2024-04-12 | End: 2024-05-12

## 2024-04-11 RX ORDER — FOLIC ACID 1 MG/1
1 TABLET ORAL DAILY
Qty: 30 TABLET | Refills: 0 | Status: SHIPPED | OUTPATIENT
Start: 2024-04-12 | End: 2024-05-12

## 2024-04-11 RX ORDER — LACTULOSE 10 G/15ML
20 SOLUTION ORAL 2 TIMES DAILY
Qty: 1800 ML | Refills: 0 | Status: SHIPPED | OUTPATIENT
Start: 2024-04-11 | End: 2024-05-11

## 2024-04-11 RX ORDER — PANTOPRAZOLE SODIUM 40 MG/1
40 TABLET, DELAYED RELEASE ORAL
Qty: 60 TABLET | Refills: 0 | Status: SHIPPED | OUTPATIENT
Start: 2024-04-11 | End: 2024-05-11

## 2024-04-11 RX ORDER — MULTIPLE VITAMINS W/ MINERALS TAB 9MG-400MCG
1 TAB ORAL DAILY
Qty: 30 TABLET | Refills: 0 | Status: SHIPPED | OUTPATIENT
Start: 2024-04-12 | End: 2024-05-12

## 2024-04-11 RX ORDER — CEFDINIR 300 MG/1
300 CAPSULE ORAL EVERY 12 HOURS
Qty: 3 CAPSULE | Refills: 0 | Status: SHIPPED | OUTPATIENT
Start: 2024-04-11

## 2024-04-11 RX ADMIN — PANTOPRAZOLE SODIUM 40 MG: 40 TABLET, DELAYED RELEASE ORAL at 08:06

## 2024-04-11 RX ADMIN — THIAMINE HCL TAB 100 MG 100 MG: 100 TAB at 08:06

## 2024-04-11 RX ADMIN — LACTULOSE 20 G: 10 SOLUTION ORAL at 08:06

## 2024-04-11 RX ADMIN — FOLIC ACID 1 MG: 1 TABLET ORAL at 08:06

## 2024-04-11 RX ADMIN — Medication 1 TABLET: at 08:06

## 2024-04-11 RX ADMIN — CEFDINIR 300 MG: 300 CAPSULE ORAL at 08:06

## 2024-04-11 ASSESSMENT — ACTIVITIES OF DAILY LIVING (ADL)
ADLS_ACUITY_SCORE: 24

## 2024-04-11 NOTE — PLAN OF CARE
Physical Therapy Discharge Summary    Reason for therapy discharge:    Discharged to home.    Progress towards therapy goal(s). See goals on Care Plan in [facility] electronic health record for goal details.  Goals not met.  Barriers to achieving goals:   discharge from facility.    Therapy recommendation(s):    none

## 2024-04-11 NOTE — PLAN OF CARE
Problem: Electrolyte Imbalance  Goal: Electrolyte Balance  Outcome: Adequate for Care Transition     Problem: Adult Inpatient Plan of Care  Goal: Optimal Comfort and Wellbeing  Outcome: Adequate for Care Transition  Intervention: Provide Person-Centered Care  Recent Flowsheet Documentation  Taken 4/11/2024 0809 by Claire Zimmerman, RN  Trust Relationship/Rapport:   care explained   emotional support provided   questions encouraged     Problem: Adult Inpatient Plan of Care  Goal: Readiness for Transition of Care  Outcome: Adequate for Care Transition     Patient VSS on RA this morning, A+Ox4, denying pain. Mag, K, and Phos levels all WNL. No nausea or vomiting, no signs of GI bleeding noted this shift. Discharge orders placed, writer went over AVS with patient and patient's father. Transported home w/ father at discharge.     For detailed vital signs and assessments, please see documentation flowsheets. For detailed medication administrations, please see KEANU Zimmerman, RN 4/11/2024  [phone]

## 2024-04-11 NOTE — DISCHARGE SUMMARY
"[facility]  Hospitalist Discharge Summary      Date of Admission:  4/7/2024  Date of Discharge:  4/11/2024  Discharging Provider: Mari Burton MD  Discharge Service: Hospitalist Service    Discharge Diagnoses   Principal Problem:    GI bleed  Active Problems:    Hypokalemia    Alcohol abuse    Hyponatremia    Hematemesis with nausea    Anemia due to blood loss, acute    Alcoholic cirrhosis of liver without ascites (H)      Clinically Significant Risk Factors     # Obesity: Estimated body mass index is 35.03 kg/m  as calculated from the following:    Height as of an earlier encounter on 4/7/24: 1.575 m (5' 2\").    Weight as of this encounter: 86.9 kg (191 lb 8 oz).  # Severe Malnutrition: based on nutrition assessment      Follow-ups Needed After Discharge   Follow-up Appointments     Follow-up and recommended labs and tests       Patient to arrange outpatient follow up with primary care provider,   [facility], within 2-3 days, to evaluate medication   change, for hospital follow- up, and regarding new diagnosis. The   following labs/tests are recommended: CMP, CBC and Mag    Patient to arrange outpatient follow up with [facility].            Unresulted Labs Ordered in the Past 30 Days of this Admission       Date and Time Order Name Status Description    4/8/2024  8:56 AM Blood Culture Hand, Right Preliminary     4/8/2024  8:56 AM Blood Culture Hand, Right Preliminary         These results will be followed up by [facility]    Discharge Disposition   Discharged to home  Condition at discharge: Stable    Hospital Course     Tere Hoyt is a 43 year old female with history of alcohol abuse, alcoholic cirrhosis, alcohol related seizure, HSV-2, depression, and tobacco use disorder admitted on 4/7/2024 as a transfer from [facility] with hematemesis possibly secondary to variceal bleed.     Acute blood loss anemia secondary to GI bleed  UGIB, resolved  Portal " hypertensive gastropathy/gastritis  Coagulopathy due to cirrhosis  -CT scan cirrhosis, portal hypertension, paraesophageal and gastroesophageal varices  -EGD (4/9/24) showed gastritis which was biopsied.  Pathology pending.  Remainder of EGD unremarkable.  -Hemodynamically stable w/o recurrent hematemesis  -stopped octreotide on 4/9/24 following EGD showing no varices as originally identified on CT A/P.  -Bcx2 NGTD  -empiric IV CTX.  Will change to Cefdinir 300mg PO BID for a total of 7 day of antimicrobial therapy  -PO PPI BID  - per GI ok to discharge and patient to follow up with [facility]     #Decompensated alcoholic cirrhosis, portal HTN, Thrombocytopenia, Coagulopathy, paraesophageal and gastroesophageal varices ((on CT but not evident on EGD), toxic/metabolic encephalopathy (resolved)  #Alcoholic hepatitis, improving  #Transaminitis, improving  -BC's x2 NGTD  -EGD   -CK mild elevation trending down   -tylenol level normal  -viral hepatitis panel pending  -hold on hepatic duplex ultrasound given improving LFT's  PPI BID .  -GI consult appreciated patient to arrange outpatient follow up   - lactulose      Possible Rhabdomyolysis  -CK decreasing      Acute toxic/metabolic encephalopathy:-resolved  -suspect element of hepatic encephalopathy  -CT Head unremarkable for acute pathology  -BC's x 2 NGTD  -lactulose 20g BID. Titrate to 2-3 BM's per 24hrs     Possible alcohol induced acute pancreatitis  Elevated lipase  -Lipase was elevated at 210  -CT did not show evidence of pancreatitis     Chronic alcohol abuse  -Patient was previously sober for about 11 months but started drinking alcohol again about 1-2 weeks ago.   -No evidence of alcohol withdrawal at this time  -chemical dependency consult obtained  - WA protocol previously stopped   -continue thiamine, folic acid     Hyponatremia  - resolved      Hypokalemia (resolved), Hypophosphatemia (replacing), Hypomagnesemia (resolved)  - replaced       Thrombocytopenia  -Due to cirrhosis chronic recheck at PCP follow up      Hyperglycemia:  -no h/o DM 2  -A1C 5.2% in setting of low Hgb       Consultations This Hospital Stay   CHEMICAL DEPENDENCY IP CONSULT  CARE MANAGEMENT / SOCIAL WORK IP CONSULT  PHYSICAL THERAPY ADULT IP CONSULT    Code Status   Full Code    Time Spent on this Encounter   I, Mari Burton MD, personally saw the patient today and spent greater than 30 minutes discharging this patient.       Mari Burton MD  [address] [address]  Phone: [phone]  Fax: [phone]  ______________________________________________________________________    Physical Exam   Vital Signs: Temp: 98.9  F (37.2  C) Temp src: Oral BP: 115/62 Pulse: 111   Resp: 20 SpO2: 98 % O2 Device: None (Room air)    Weight: 191 lbs 8 oz  Physical Exam  Vitals and nursing note reviewed.   Constitutional:       General: She is not in acute distress.     Appearance: She is obese. She is not ill-appearing.   Cardiovascular:      Rate and Rhythm: Regular rhythm. Tachycardia present.      Pulses: Normal pulses.      Heart sounds: Normal heart sounds.   Pulmonary:      Effort: Pulmonary effort is normal. No respiratory distress.      Breath sounds: Normal breath sounds. No wheezing or rales.   Abdominal:      General: Bowel sounds are normal. There is no distension.      Palpations: Abdomen is soft.      Tenderness: There is no abdominal tenderness. There is no guarding.   Musculoskeletal:         General: Normal range of motion.   Skin:     General: Skin is warm.      Capillary Refill: Capillary refill takes less than 2 seconds.   Neurological:      General: No focal deficit present.      Mental Status: She is alert and oriented to person, place, and time. Mental status is at baseline.              Primary Care Physician   [facility]    Discharge Orders      Follow-up and recommended labs and  tests     Patient to arrange outpatient follow up with primary care provider, [facility], within 2-3 days, to evaluate medication change, for hospital follow- up, and regarding new diagnosis. The following labs/tests are recommended: CMP, CBC and Mag    Patient to arrange outpatient follow up with [facility].     Activity    Your activity upon discharge: activity as tolerated     Reason for your hospital stay    Principal Problem:    GI bleed  Active Problems:    Hypokalemia    Alcohol abuse    Hyponatremia    Hematemesis with nausea    Anemia due to blood loss, acute    Alcoholic cirrhosis of liver without ascites (H)     Discharge Instructions    Stress alcohol cessation.   Please see your primary for FMLA   Transitional care recommended by therapy. Social work discussed with you which you declined and alternative recommendation is Homecare which you have declined. Please speak with your PCP with you change your mind on either of these to help coordinate after discharged from Hospital.     Diet    Follow this diet upon discharge: Orders Placed This Encounter      Regular Diet Adult       Significant Results and Procedures   Most Recent 3 CBC's:  Recent Labs   Lab Test 04/11/24  0616 04/10/24  0422 04/09/24  0304 04/07/24  1837 04/07/24  1303   WBC  --  5.7 5.5  --  11.9*   HGB 8.3* 7.5* 7.8*   < > 6.5*   MCV  --  84 80  --  81   PLT  --  97* 75*  --  113*    < > = values in this interval not displayed.     Most Recent 3 BMP's:  Recent Labs   Lab Test 04/11/24  0616 04/10/24  1542 04/10/24  1341 04/10/24  0752 04/10/24  0352 04/10/24  0252 04/09/24  2050 04/09/24  1739 04/09/24  0436 04/09/24  0304 04/08/24  1452 04/08/24  0734   NA  --   --   --   --   --  136  --   --   --  141  --  135   POTASSIUM 3.8  --   --   --   --  4.0  --  3.9   < > 2.6*   < > 2.6*   CHLORIDE  --   --   --   --   --  103  --   --   --  104  --  98   CO2  --   --   --   --   --  23  --   --   --  25  --  24   BUN   --   --   --   --   --  4.4*  --   --   --  10.5  --  29.3*   CR  --   --   --   --   --  0.57  --   --   --  0.60  --  0.78   ANIONGAP  --   --   --   --   --  10  --   --   --  12  --  13   JUSTICE 8.7  --   --   --   --  7.6*  --   --   --  7.1*  --  7.5*   GLC  --  171* 125* 133*   < > 112*   < >  --    < > 107*   < > 144*    < > = values in this interval not displayed.     Most Recent 2 LFT's:  Recent Labs   Lab Test 04/11/24  0616 04/10/24  0252   * 872*   * 821*   ALKPHOS 97 62   BILITOTAL 0.7 0.7     Most Recent 3 INR's:  Recent Labs   Lab Test 04/08/24  1054 04/07/24  1303 02/14/23  0714   INR 1.56* 1.97* 1.19*     Most Recent Urinalysis:  Recent Labs   Lab Test 04/07/24 2021   COLOR Light Yellow   APPEARANCE Clear   URINEGLC Negative   URINEBILI Negative   URINEKETONE Trace*   SG <1.005   UBLD 1.0 mg/dL*   URINEPH 7.0   PROTEIN 20*   NITRITE Negative   LEUKEST Negative   RBCU 2   WBCU 3   ,   Results for orders placed or performed during the hospital encounter of 04/07/24   XR Chest Port 1 View    Narrative    EXAM: XR CHEST PORT 1 VIEW  LOCATION: [facility]  DATE: 4/8/2024    INDICATION: recurrent emesis, r o infiltrate  COMPARISON: X-ray 03/13/2023      Impression    IMPRESSION: Low lung volumes. Minimal right basilar opacities likely reflect atelectasis. No focal pneumonic consolidation or pleural effusion. Normal heart size.   CT Head w/o Contrast    Narrative    EXAM: CT HEAD W/O CONTRAST  LOCATION: [facility]  DATE: 4/8/2024    INDICATION: chronic alcohol abuse, confusion, high risk for fall, coagulopathy,   COMPARISON: None.  TECHNIQUE: Routine CT Head without IV contrast. Multiplanar reformats. Dose reduction techniques were used.    FINDINGS:  INTRACRANIAL CONTENTS: No intracranial hemorrhage, extraaxial collection, or mass effect.  No CT evidence of acute infarct. Normal parenchymal attenuation. Normal ventricles and sulci.      VISUALIZED ORBITS/SINUSES/MASTOIDS: No intraorbital abnormality. No paranasal sinus mucosal disease. No middle ear or mastoid effusion.    BONES/SOFT TISSUES: No acute abnormality.      Impression    IMPRESSION:  1.  No acute intracranial injury, hemorrhage, mass, or CT evidence of recent ischemia.       Discharge Medications   Current Discharge Medication List        START taking these medications    Details   cefdinir (OMNICEF) 300 MG capsule Take 1 capsule (300 mg) by mouth every 12 hours  Qty: 3 capsule, Refills: 0    Associated Diagnoses: Gastrointestinal hemorrhage with hematemesis; Alcohol withdrawal seizure with complication (H)      folic acid (FOLVITE) 1 MG tablet Take 1 tablet (1 mg) by mouth daily for 30 days  Qty: 30 tablet, Refills: 0    Associated Diagnoses: Gastrointestinal hemorrhage with hematemesis; Alcohol withdrawal seizure with complication (H)      lactulose (CHRONULAC) 10 GM/15ML solution Take 30 mLs (20 g) by mouth 2 times daily for 30 days  Qty: 1800 mL, Refills: 0    Associated Diagnoses: Gastrointestinal hemorrhage with hematemesis; Alcohol withdrawal seizure with complication (H)      multivitamin w/minerals (THERA-VIT-M) tablet Take 1 tablet by mouth daily for 30 days  Qty: 30 tablet, Refills: 0    Associated Diagnoses: Gastrointestinal hemorrhage with hematemesis; Alcohol withdrawal seizure with complication (H)      pantoprazole (PROTONIX) 40 MG EC tablet Take 1 tablet (40 mg) by mouth 2 times daily (before meals) for 30 days  Qty: 60 tablet, Refills: 0    Associated Diagnoses: Gastrointestinal hemorrhage with hematemesis; Alcohol withdrawal seizure with complication (H)      thiamine (B-1) 100 MG tablet Take 1 tablet (100 mg) by mouth daily for 30 days  Qty: 30 tablet, Refills: 0    Associated Diagnoses: Gastrointestinal hemorrhage with hematemesis; Alcohol withdrawal seizure with complication (H)           CONTINUE these medications which have NOT CHANGED    Details   melatonin  5 MG tablet Take 5 mg by mouth nightly as needed for sleep      triamcinolone (KENALOG) 0.1 % external ointment Apply topically 2 times daily as needed (Eczema)           STOP taking these medications       acetaminophen (TYLENOL) 500 MG tablet Comments:   Reason for Stopping:             Allergies   No Known Allergies

## 2024-04-11 NOTE — PLAN OF CARE
PRIMARY DIAGNOSIS: GI Bleed  GOALS TO BE MET BEFORE DISCHARGE:  1. Pain Status: Pain free.    2. Return to near baseline physical activity: Yes    3. Cleared for discharge by consultants (if involved): No    Discharge Planner Nurse   Safe discharge environment identified: Yes       Entered by: Madhuri Marshall RN 04/10/2024 10:39 PM     Please review provider order for any additional goals.   Nurse to notify provider when observation goals have been met and patient is ready for discharge.    Problem: Adult Inpatient Plan of Care  Goal: Optimal Comfort and Wellbeing  Outcome: Progressing  Intervention: Provide Person-Centered Care  Recent Flowsheet Documentation  Taken 4/10/2024 2030 by Madhuri Marshall RN  Trust Relationship/Rapport:   care explained   choices provided   emotional support provided   empathic listening provided   questions answered   questions encouraged   reassurance provided     Problem: Electrolyte Imbalance  Goal: Electrolyte Balance  Outcome: Progressing       Goal Outcome Evaluation:      Plan of Care Reviewed With: patient    Overall Patient Progress: improvingOverall Patient Progress: improving    /61 (BP Location: Right arm, Patient Position: Sitting, Cuff Size: Adult Regular)   Pulse 108   Temp 99  F (37.2  C) (Oral)   Resp 20   Wt 86.9 kg (191 lb 8 oz)   SpO2 98%   BMI 35.03 kg/m      Madhuri Marshall RN on 4/10/2024 at 10:28 PM

## 2024-04-11 NOTE — PLAN OF CARE
Problem: Adult Inpatient Plan of Care  Goal: Plan of Care Review  Description: The Plan of Care Review/Shift note should be completed every shift.  The Outcome Evaluation is a brief statement about your assessment that the patient is improving, declining, or no change.  This information will be displayed automatically on your shift  note.  Outcome: Progressing     Problem: Adult Inpatient Plan of Care  Goal: Absence of Hospital-Acquired Illness or Injury  Outcome: Progressing  Intervention: Identify and Manage Fall Risk  Recent Flowsheet Documentation  Taken 4/11/2024 0004 by Donald Schneider RN  Safety Promotion/Fall Prevention: lighting adjusted  Intervention: Prevent Skin Injury  Recent Flowsheet Documentation  Taken 4/11/2024 0004 by Donald Schneider RN  Body Position: position changed independently   Goal Outcome Evaluation:         Pt is alert and oriented x4. Denies pain. PIV on left arm, saline locked. Stand by assist. Ambulated to toilet x2. VSS. Regular diet. On MG, K and Phos protocol.

## 2024-04-13 LAB
BACTERIA BLD CULT: NO GROWTH
BACTERIA BLD CULT: NO GROWTH

## (undated) DEVICE — SOL WATER IRRIG 1000ML BOTTLE 2F7114

## (undated) DEVICE — TUBING SUCTION MEDI-VAC 1/4"X20' N620A

## (undated) DEVICE — FORCEP BIOPSY 2.3MM DISP COATED 000388

## (undated) DEVICE — SUCTION MANIFOLD NEPTUNE 2 SYS 1 PORT 702-025-000

## (undated) RX ORDER — PROPOFOL 10 MG/ML
INJECTION, EMULSION INTRAVENOUS
Status: DISPENSED
Start: 2024-04-09

## (undated) RX ORDER — FENTANYL CITRATE 50 UG/ML
INJECTION, SOLUTION INTRAMUSCULAR; INTRAVENOUS
Status: DISPENSED
Start: 2024-04-09

## (undated) RX ORDER — ONDANSETRON 2 MG/ML
INJECTION INTRAMUSCULAR; INTRAVENOUS
Status: DISPENSED
Start: 2024-04-09

## (undated) RX ORDER — LIDOCAINE HYDROCHLORIDE 10 MG/ML
INJECTION, SOLUTION EPIDURAL; INFILTRATION; INTRACAUDAL; PERINEURAL
Status: DISPENSED
Start: 2024-04-09

## (undated) RX ORDER — DEXAMETHASONE SODIUM PHOSPHATE 10 MG/ML
INJECTION, SOLUTION INTRAMUSCULAR; INTRAVENOUS
Status: DISPENSED
Start: 2024-04-09